# Patient Record
Sex: MALE | Race: WHITE | NOT HISPANIC OR LATINO | Employment: OTHER | URBAN - METROPOLITAN AREA
[De-identification: names, ages, dates, MRNs, and addresses within clinical notes are randomized per-mention and may not be internally consistent; named-entity substitution may affect disease eponyms.]

---

## 2023-03-13 LAB
ALANINE AMINOTRANSFERASE (SGPT) (U/L) IN SER/PLAS: 7 U/L (ref 10–52)
ALBUMIN (G/DL) IN SER/PLAS: 3.6 G/DL (ref 3.4–5)
ALKALINE PHOSPHATASE (U/L) IN SER/PLAS: 66 U/L (ref 33–136)
ANION GAP IN SER/PLAS: 10 MMOL/L (ref 10–20)
ASPARTATE AMINOTRANSFERASE (SGOT) (U/L) IN SER/PLAS: 12 U/L (ref 9–39)
BILIRUBIN TOTAL (MG/DL) IN SER/PLAS: 0.6 MG/DL (ref 0–1.2)
CALCIUM (MG/DL) IN SER/PLAS: 8.9 MG/DL (ref 8.6–10.6)
CARBON DIOXIDE, TOTAL (MMOL/L) IN SER/PLAS: 30 MMOL/L (ref 21–32)
CHLORIDE (MMOL/L) IN SER/PLAS: 100 MMOL/L (ref 98–107)
CREATININE (MG/DL) IN SER/PLAS: 1.06 MG/DL (ref 0.5–1.3)
ERYTHROCYTE DISTRIBUTION WIDTH (RATIO) BY AUTOMATED COUNT: 13 % (ref 11.5–14.5)
ERYTHROCYTE MEAN CORPUSCULAR HEMOGLOBIN CONCENTRATION (G/DL) BY AUTOMATED: 32.2 G/DL (ref 32–36)
ERYTHROCYTE MEAN CORPUSCULAR VOLUME (FL) BY AUTOMATED COUNT: 100 FL (ref 80–100)
ERYTHROCYTES (10*6/UL) IN BLOOD BY AUTOMATED COUNT: 4 X10E12/L (ref 4.5–5.9)
ESTIMATED AVERAGE GLUCOSE FOR HBA1C: 123 MG/DL
GFR MALE: 71 ML/MIN/1.73M2
GLUCOSE (MG/DL) IN SER/PLAS: 78 MG/DL (ref 74–99)
HEMATOCRIT (%) IN BLOOD BY AUTOMATED COUNT: 39.8 % (ref 41–52)
HEMOGLOBIN (G/DL) IN BLOOD: 12.8 G/DL (ref 13.5–17.5)
HEMOGLOBIN A1C/HEMOGLOBIN TOTAL IN BLOOD: 5.9 %
LEUKOCYTES (10*3/UL) IN BLOOD BY AUTOMATED COUNT: 6.8 X10E9/L (ref 4.4–11.3)
NRBC (PER 100 WBCS) BY AUTOMATED COUNT: 0 /100 WBC (ref 0–0)
PLATELETS (10*3/UL) IN BLOOD AUTOMATED COUNT: 293 X10E9/L (ref 150–450)
POTASSIUM (MMOL/L) IN SER/PLAS: 4.8 MMOL/L (ref 3.5–5.3)
PROTEIN TOTAL: 6 G/DL (ref 6.4–8.2)
SODIUM (MMOL/L) IN SER/PLAS: 135 MMOL/L (ref 136–145)
UREA NITROGEN (MG/DL) IN SER/PLAS: 12 MG/DL (ref 6–23)

## 2023-05-16 LAB
ALANINE AMINOTRANSFERASE (SGPT) (U/L) IN SER/PLAS: 6 U/L (ref 10–52)
ALBUMIN (G/DL) IN SER/PLAS: 3.8 G/DL (ref 3.4–5)
ALKALINE PHOSPHATASE (U/L) IN SER/PLAS: 73 U/L (ref 33–136)
ANION GAP IN SER/PLAS: 12 MMOL/L (ref 10–20)
ASPARTATE AMINOTRANSFERASE (SGOT) (U/L) IN SER/PLAS: 12 U/L (ref 9–39)
BILIRUBIN TOTAL (MG/DL) IN SER/PLAS: 0.7 MG/DL (ref 0–1.2)
CALCIUM (MG/DL) IN SER/PLAS: 8.7 MG/DL (ref 8.6–10.6)
CARBON DIOXIDE, TOTAL (MMOL/L) IN SER/PLAS: 31 MMOL/L (ref 21–32)
CHLORIDE (MMOL/L) IN SER/PLAS: 99 MMOL/L (ref 98–107)
CREATININE (MG/DL) IN SER/PLAS: 1.44 MG/DL (ref 0.5–1.3)
ERYTHROCYTE DISTRIBUTION WIDTH (RATIO) BY AUTOMATED COUNT: 13.2 % (ref 11.5–14.5)
ERYTHROCYTE MEAN CORPUSCULAR HEMOGLOBIN CONCENTRATION (G/DL) BY AUTOMATED: 32.6 G/DL (ref 32–36)
ERYTHROCYTE MEAN CORPUSCULAR VOLUME (FL) BY AUTOMATED COUNT: 95 FL (ref 80–100)
ERYTHROCYTES (10*6/UL) IN BLOOD BY AUTOMATED COUNT: 4.06 X10E12/L (ref 4.5–5.9)
GFR MALE: 49 ML/MIN/1.73M2
GLUCOSE (MG/DL) IN SER/PLAS: 75 MG/DL (ref 74–99)
HEMATOCRIT (%) IN BLOOD BY AUTOMATED COUNT: 38.4 % (ref 41–52)
HEMOGLOBIN (G/DL) IN BLOOD: 12.5 G/DL (ref 13.5–17.5)
LEUKOCYTES (10*3/UL) IN BLOOD BY AUTOMATED COUNT: 6.9 X10E9/L (ref 4.4–11.3)
NRBC (PER 100 WBCS) BY AUTOMATED COUNT: 0 /100 WBC (ref 0–0)
PLATELETS (10*3/UL) IN BLOOD AUTOMATED COUNT: 267 X10E9/L (ref 150–450)
POTASSIUM (MMOL/L) IN SER/PLAS: 4.1 MMOL/L (ref 3.5–5.3)
PROTEIN TOTAL: 6.4 G/DL (ref 6.4–8.2)
SODIUM (MMOL/L) IN SER/PLAS: 138 MMOL/L (ref 136–145)
UREA NITROGEN (MG/DL) IN SER/PLAS: 22 MG/DL (ref 6–23)

## 2023-09-12 ENCOUNTER — APPOINTMENT (OUTPATIENT)
Dept: PRIMARY CARE | Facility: CLINIC | Age: 83
End: 2023-09-12
Payer: MEDICARE

## 2023-10-08 PROBLEM — N18.30 CKD (CHRONIC KIDNEY DISEASE), STAGE III (MULTI): Status: ACTIVE | Noted: 2023-10-08

## 2023-10-08 PROBLEM — N40.0 BPH (BENIGN PROSTATIC HYPERPLASIA): Status: ACTIVE | Noted: 2023-10-08

## 2023-10-08 PROBLEM — N39.490 OVERFLOW INCONTINENCE OF URINE: Status: ACTIVE | Noted: 2023-10-08

## 2023-10-08 PROBLEM — M16.11 PRIMARY OSTEOARTHRITIS OF RIGHT HIP: Status: ACTIVE | Noted: 2023-10-08

## 2023-10-08 PROBLEM — I50.20 SYSTOLIC HEART FAILURE (MULTI): Status: ACTIVE | Noted: 2023-10-08

## 2023-10-08 PROBLEM — M79.2 NEUROPATHIC PAIN: Status: ACTIVE | Noted: 2023-10-08

## 2023-10-08 PROBLEM — M10.9 GOUT: Status: ACTIVE | Noted: 2023-10-08

## 2023-10-08 PROBLEM — N31.2 ATONIC BLADDER: Status: ACTIVE | Noted: 2023-10-08

## 2023-10-08 PROBLEM — I25.10 CAD (CORONARY ARTERY DISEASE): Status: ACTIVE | Noted: 2023-10-08

## 2023-10-08 PROBLEM — R60.0 BILATERAL EDEMA OF LOWER EXTREMITY: Status: ACTIVE | Noted: 2023-10-08

## 2023-10-08 PROBLEM — I15.2 HYPERTENSION ASSOCIATED WITH DIABETES (MULTI): Status: ACTIVE | Noted: 2023-10-08

## 2023-10-08 PROBLEM — T14.8XXA WOUND OF SKIN: Status: ACTIVE | Noted: 2023-10-08

## 2023-10-08 PROBLEM — R26.81 GAIT INSTABILITY: Status: ACTIVE | Noted: 2023-10-08

## 2023-10-08 PROBLEM — I87.2 VENOUS STASIS DERMATITIS OF BOTH LOWER EXTREMITIES: Status: ACTIVE | Noted: 2023-10-08

## 2023-10-08 PROBLEM — R33.9 URINARY RETENTION: Status: ACTIVE | Noted: 2023-10-08

## 2023-10-08 PROBLEM — N48.1 BALANITIS: Status: ACTIVE | Noted: 2023-10-08

## 2023-10-08 PROBLEM — C61 PROSTATE CANCER (MULTI): Status: ACTIVE | Noted: 2023-10-08

## 2023-10-08 PROBLEM — E66.01 SEVERE OBESITY (BMI 35.0-39.9) WITH COMORBIDITY (MULTI): Status: ACTIVE | Noted: 2023-10-08

## 2023-10-08 PROBLEM — N39.41 URGE INCONTINENCE OF URINE: Status: ACTIVE | Noted: 2023-10-08

## 2023-10-08 PROBLEM — E11.21 DIABETIC NEPHROPATHY (MULTI): Status: ACTIVE | Noted: 2023-10-08

## 2023-10-08 PROBLEM — N39.43 DRIBBLING OF URINE: Status: ACTIVE | Noted: 2023-10-08

## 2023-10-08 PROBLEM — Z95.1 S/P CABG (CORONARY ARTERY BYPASS GRAFT): Status: ACTIVE | Noted: 2023-10-08

## 2023-10-08 PROBLEM — N31.9 NEUROGENIC BLADDER: Status: ACTIVE | Noted: 2023-10-08

## 2023-10-08 PROBLEM — E11.59 HYPERTENSION ASSOCIATED WITH DIABETES (MULTI): Status: ACTIVE | Noted: 2023-10-08

## 2023-10-08 PROBLEM — M54.50 CHRONIC LOW BACK PAIN: Status: ACTIVE | Noted: 2023-10-08

## 2023-10-08 PROBLEM — G89.29 CHRONIC LOW BACK PAIN: Status: ACTIVE | Noted: 2023-10-08

## 2023-10-08 RX ORDER — ATORVASTATIN CALCIUM 80 MG/1
1 TABLET, FILM COATED ORAL DAILY
COMMUNITY
Start: 2018-03-03

## 2023-10-08 RX ORDER — GLIPIZIDE 10 MG/1
20 TABLET ORAL DAILY
COMMUNITY
Start: 2018-03-03 | End: 2023-12-12 | Stop reason: HOSPADM

## 2023-10-08 RX ORDER — DULAGLUTIDE 3 MG/.5ML
INJECTION, SOLUTION SUBCUTANEOUS
COMMUNITY
Start: 2018-12-13 | End: 2023-10-10 | Stop reason: ALTCHOICE

## 2023-10-08 RX ORDER — METOPROLOL TARTRATE 100 MG/1
0.5 TABLET ORAL 2 TIMES DAILY
COMMUNITY
Start: 2018-03-14 | End: 2023-12-16 | Stop reason: HOSPADM

## 2023-10-08 RX ORDER — NYSTATIN AND TRIAMCINOLONE ACETONIDE 100000; 1 [USP'U]/G; MG/G
OINTMENT TOPICAL 2 TIMES DAILY
COMMUNITY
Start: 2022-05-16 | End: 2023-12-14 | Stop reason: ENTERED-IN-ERROR

## 2023-10-08 RX ORDER — DULAGLUTIDE 1.5 MG/.5ML
INJECTION, SOLUTION SUBCUTANEOUS
COMMUNITY
End: 2023-10-10 | Stop reason: ALTCHOICE

## 2023-10-08 RX ORDER — AMMONIUM LACTATE 12 G/100G
1 CREAM TOPICAL AS NEEDED
COMMUNITY

## 2023-10-08 RX ORDER — MIRABEGRON 50 MG/1
1 TABLET, EXTENDED RELEASE ORAL DAILY
COMMUNITY
Start: 2022-07-05 | End: 2023-10-10 | Stop reason: ALTCHOICE

## 2023-10-08 RX ORDER — TORSEMIDE 100 MG/1
100 TABLET ORAL DAILY
COMMUNITY
Start: 2018-11-30 | End: 2023-12-12 | Stop reason: HOSPADM

## 2023-10-08 RX ORDER — ISOSORBIDE DINITRATE 30 MG/1
1 TABLET ORAL DAILY
COMMUNITY
End: 2023-10-10

## 2023-10-08 RX ORDER — SOLIFENACIN SUCCINATE 10 MG/1
1 TABLET, FILM COATED ORAL DAILY
COMMUNITY
Start: 2022-08-08 | End: 2023-10-10 | Stop reason: ALTCHOICE

## 2023-10-08 RX ORDER — INSULIN GLARGINE 100 [IU]/ML
36 INJECTION, SOLUTION SUBCUTANEOUS EVERY MORNING
COMMUNITY
Start: 2018-08-06 | End: 2023-12-12 | Stop reason: HOSPADM

## 2023-10-08 RX ORDER — TORSEMIDE 20 MG/1
2 TABLET ORAL EVERY MORNING
COMMUNITY
Start: 2020-09-17 | End: 2023-12-12 | Stop reason: HOSPADM

## 2023-10-08 RX ORDER — ISOSORBIDE MONONITRATE 30 MG/1
1 TABLET, EXTENDED RELEASE ORAL DAILY
COMMUNITY
Start: 2018-08-09

## 2023-10-08 RX ORDER — BLOOD SUGAR DIAGNOSTIC
STRIP MISCELLANEOUS
COMMUNITY
Start: 2016-12-13 | End: 2023-12-12 | Stop reason: HOSPADM

## 2023-10-08 RX ORDER — LISINOPRIL 20 MG/1
1 TABLET ORAL DAILY
COMMUNITY
Start: 2017-07-18 | End: 2023-11-20 | Stop reason: DRUGHIGH

## 2023-10-08 RX ORDER — NAPROXEN SODIUM 220 MG/1
81 TABLET, FILM COATED ORAL DAILY
COMMUNITY
Start: 2018-07-26

## 2023-10-08 RX ORDER — TAMSULOSIN HYDROCHLORIDE 0.4 MG/1
0.4 CAPSULE ORAL
COMMUNITY
End: 2023-10-10 | Stop reason: ALTCHOICE

## 2023-10-08 RX ORDER — PHENAZOPYRIDINE HYDROCHLORIDE 200 MG/1
200 TABLET, FILM COATED ORAL 3 TIMES DAILY
COMMUNITY
Start: 2022-06-17 | End: 2023-10-10 | Stop reason: ALTCHOICE

## 2023-10-08 RX ORDER — GABAPENTIN 100 MG/1
100 CAPSULE ORAL 3 TIMES DAILY
COMMUNITY
End: 2023-10-10 | Stop reason: ALTCHOICE

## 2023-10-08 RX ORDER — PEN NEEDLE, DIABETIC 30 GX3/16"
NEEDLE, DISPOSABLE MISCELLANEOUS
COMMUNITY
End: 2023-10-10 | Stop reason: SDUPTHER

## 2023-10-08 RX ORDER — LANCETS
EACH MISCELLANEOUS
COMMUNITY
End: 2023-12-14 | Stop reason: ENTERED-IN-ERROR

## 2023-10-08 RX ORDER — SENNOSIDES 8.6 MG/1
2 TABLET ORAL 2 TIMES DAILY
COMMUNITY
Start: 2022-06-17 | End: 2023-10-10 | Stop reason: ALTCHOICE

## 2023-10-10 ENCOUNTER — LAB (OUTPATIENT)
Dept: LAB | Facility: LAB | Age: 83
End: 2023-10-10
Payer: MEDICARE

## 2023-10-10 ENCOUNTER — OFFICE VISIT (OUTPATIENT)
Dept: PRIMARY CARE | Facility: CLINIC | Age: 83
End: 2023-10-10
Payer: MEDICARE

## 2023-10-10 ENCOUNTER — HOME HEALTH ADMISSION (OUTPATIENT)
Dept: HOME HEALTH SERVICES | Facility: HOME HEALTH | Age: 83
End: 2023-10-10
Payer: MEDICARE

## 2023-10-10 VITALS
SYSTOLIC BLOOD PRESSURE: 123 MMHG | HEART RATE: 48 BPM | DIASTOLIC BLOOD PRESSURE: 63 MMHG | TEMPERATURE: 97.5 F | OXYGEN SATURATION: 96 %

## 2023-10-10 DIAGNOSIS — J47.9 BRONCHIECTASIS, UNCOMPLICATED (MULTI): ICD-10-CM

## 2023-10-10 DIAGNOSIS — E11.22 TYPE 2 DIABETES MELLITUS WITH STAGE 3B CHRONIC KIDNEY DISEASE, WITH LONG-TERM CURRENT USE OF INSULIN (MULTI): ICD-10-CM

## 2023-10-10 DIAGNOSIS — Z79.4 TYPE 2 DIABETES MELLITUS WITH STAGE 3B CHRONIC KIDNEY DISEASE, WITH LONG-TERM CURRENT USE OF INSULIN (MULTI): ICD-10-CM

## 2023-10-10 DIAGNOSIS — E11.51 TYPE 2 DIABETES MELLITUS WITH DIABETIC PERIPHERAL ANGIOPATHY WITHOUT GANGRENE, WITH LONG-TERM CURRENT USE OF INSULIN (MULTI): ICD-10-CM

## 2023-10-10 DIAGNOSIS — R33.9 URINARY RETENTION: ICD-10-CM

## 2023-10-10 DIAGNOSIS — N18.32 TYPE 2 DIABETES MELLITUS WITH STAGE 3B CHRONIC KIDNEY DISEASE, WITH LONG-TERM CURRENT USE OF INSULIN (MULTI): Primary | ICD-10-CM

## 2023-10-10 DIAGNOSIS — C61 PROSTATE CANCER (MULTI): ICD-10-CM

## 2023-10-10 DIAGNOSIS — Z79.4 TYPE 2 DIABETES MELLITUS WITH DIABETIC PERIPHERAL ANGIOPATHY WITHOUT GANGRENE, WITH LONG-TERM CURRENT USE OF INSULIN (MULTI): ICD-10-CM

## 2023-10-10 DIAGNOSIS — N18.32 TYPE 2 DIABETES MELLITUS WITH STAGE 3B CHRONIC KIDNEY DISEASE, WITH LONG-TERM CURRENT USE OF INSULIN (MULTI): ICD-10-CM

## 2023-10-10 DIAGNOSIS — E66.01 SEVERE OBESITY (BMI 35.0-39.9) WITH COMORBIDITY (MULTI): ICD-10-CM

## 2023-10-10 DIAGNOSIS — I50.22 CHRONIC SYSTOLIC HEART FAILURE (MULTI): ICD-10-CM

## 2023-10-10 DIAGNOSIS — E11.22 TYPE 2 DIABETES MELLITUS WITH STAGE 3B CHRONIC KIDNEY DISEASE, WITH LONG-TERM CURRENT USE OF INSULIN (MULTI): Primary | ICD-10-CM

## 2023-10-10 DIAGNOSIS — Z79.4 TYPE 2 DIABETES MELLITUS WITH STAGE 3B CHRONIC KIDNEY DISEASE, WITH LONG-TERM CURRENT USE OF INSULIN (MULTI): Primary | ICD-10-CM

## 2023-10-10 PROCEDURE — 1036F TOBACCO NON-USER: CPT | Performed by: INTERNAL MEDICINE

## 2023-10-10 PROCEDURE — 99214 OFFICE O/P EST MOD 30 MIN: CPT | Performed by: INTERNAL MEDICINE

## 2023-10-10 PROCEDURE — 1159F MED LIST DOCD IN RCRD: CPT | Performed by: INTERNAL MEDICINE

## 2023-10-10 PROCEDURE — 3074F SYST BP LT 130 MM HG: CPT | Performed by: INTERNAL MEDICINE

## 2023-10-10 PROCEDURE — 83036 HEMOGLOBIN GLYCOSYLATED A1C: CPT

## 2023-10-10 PROCEDURE — 3078F DIAST BP <80 MM HG: CPT | Performed by: INTERNAL MEDICINE

## 2023-10-10 PROCEDURE — 85027 COMPLETE CBC AUTOMATED: CPT

## 2023-10-10 PROCEDURE — 36415 COLL VENOUS BLD VENIPUNCTURE: CPT

## 2023-10-10 PROCEDURE — 80053 COMPREHEN METABOLIC PANEL: CPT

## 2023-10-10 RX ORDER — GLIPIZIDE 10 MG/1
TABLET ORAL
COMMUNITY
Start: 2023-01-19 | End: 2023-10-10

## 2023-10-10 RX ORDER — PEN NEEDLE, DIABETIC 30 GX3/16"
NEEDLE, DISPOSABLE MISCELLANEOUS
Qty: 100 EACH | Refills: 2 | Status: SHIPPED | OUTPATIENT
Start: 2023-10-10

## 2023-10-10 RX ORDER — TORSEMIDE 100 MG/1
TABLET ORAL
COMMUNITY
Start: 2023-01-19 | End: 2023-10-10

## 2023-10-10 RX ORDER — ATORVASTATIN CALCIUM 80 MG/1
TABLET, FILM COATED ORAL
COMMUNITY
Start: 2023-01-19 | End: 2023-10-10 | Stop reason: ALTCHOICE

## 2023-10-10 ASSESSMENT — PATIENT HEALTH QUESTIONNAIRE - PHQ9
1. LITTLE INTEREST OR PLEASURE IN DOING THINGS: NOT AT ALL
2. FEELING DOWN, DEPRESSED OR HOPELESS: NOT AT ALL
SUM OF ALL RESPONSES TO PHQ9 QUESTIONS 1 AND 2: 0

## 2023-10-10 NOTE — PROGRESS NOTES
Subjective   Patient ID: Dennis Galo is a 83 y.o. male who presents for Follow-up.    HPI     Has  been following with Urology. Has catheter.    Leg swelling is doing better. He is elevating legs. No blisters.    Not checking sugars.  He doesn't want to try the Bev.  He is not using the trulicity, has been off for some time    Using walker at home.    Breathing at baseline.      Review of Systems    Objective   /63   Pulse (!) 48   Temp 36.4 °C (97.5 °F)   SpO2 96%     Physical Exam  Constitutional:       General: He is not in acute distress.     Comments: In wheelchair   Cardiovascular:      Rate and Rhythm: Normal rate and regular rhythm.      Heart sounds: No murmur heard.  Pulmonary:      Effort: Pulmonary effort is normal.      Breath sounds: Normal breath sounds.   Musculoskeletal:      Cervical back: No tenderness.      Thoracic back: No tenderness.      Lumbar back: No tenderness.      Right lower leg: Edema (trace) present.      Left lower leg: Edema (trace) present.   Neurological:      General: No focal deficit present.      Mental Status: He is alert.      Deep Tendon Reflexes:      Reflex Scores:       Patellar reflexes are 2+ on the right side and 2+ on the left side.        Assessment/Plan   Problem List Items Addressed This Visit             ICD-10-CM    Type 2 diabetes mellitus with chronic kidney disease, with long-term current use of insulin (CMS/[facility]) - Primary E11.22, Z79.4            DMII - With neuropathy and nephropathy. Controlled  -Stressed dietary compliance  -Continue current meds  -Sees Dr Ledesma, Podiatry.  -Sees Optho    H/o CAD - H/o 2 stents  -Cont aspirin, statin, beta blocker  -Follows with deisi Bernard to see, advised to schedule    CHF  -Follows with deisi Bernard to see, advised to schedule  -Continue torsemide, continue torsemide 160mg    LE edema - multifactorial likely from CHF and CKD  -Continue torsemide as above    HTN   -Continue current  meds    Prostate cancer - Seeing Urology and Oncology    Chronic low back pain    Hip OA    Obesity - discussed diet and lifestyle changes    Unsteady gait - Using walker    Health maintenance:  -Pneumonia vaccines UTD  -Shingles - doesn’t seem that he has had it, recommended    f/u 4 months

## 2023-10-11 LAB
ALBUMIN SERPL BCP-MCNC: 3.7 G/DL (ref 3.4–5)
ALP SERPL-CCNC: 67 U/L (ref 33–136)
ALT SERPL W P-5'-P-CCNC: 12 U/L (ref 10–52)
ANION GAP SERPL CALC-SCNC: 12 MMOL/L (ref 10–20)
AST SERPL W P-5'-P-CCNC: 13 U/L (ref 9–39)
BILIRUB SERPL-MCNC: 0.6 MG/DL (ref 0–1.2)
BUN SERPL-MCNC: 26 MG/DL (ref 6–23)
CALCIUM SERPL-MCNC: 9.2 MG/DL (ref 8.6–10.6)
CHLORIDE SERPL-SCNC: 101 MMOL/L (ref 98–107)
CO2 SERPL-SCNC: 32 MMOL/L (ref 21–32)
CREAT SERPL-MCNC: 1.64 MG/DL (ref 0.5–1.3)
ERYTHROCYTE [DISTWIDTH] IN BLOOD BY AUTOMATED COUNT: 12.7 % (ref 11.5–14.5)
EST. AVERAGE GLUCOSE BLD GHB EST-MCNC: 157 MG/DL
GFR SERPL CREATININE-BSD FRML MDRD: 42 ML/MIN/1.73M*2
GLUCOSE SERPL-MCNC: 81 MG/DL (ref 74–99)
HBA1C MFR BLD: 7.1 %
HCT VFR BLD AUTO: 38.6 % (ref 41–52)
HGB BLD-MCNC: 12.4 G/DL (ref 13.5–17.5)
MCH RBC QN AUTO: 30.9 PG (ref 26–34)
MCHC RBC AUTO-ENTMCNC: 32.1 G/DL (ref 32–36)
MCV RBC AUTO: 96 FL (ref 80–100)
NRBC BLD-RTO: 0 /100 WBCS (ref 0–0)
PLATELET # BLD AUTO: 225 X10*3/UL (ref 150–450)
PMV BLD AUTO: 12.4 FL (ref 7.5–11.5)
POTASSIUM SERPL-SCNC: 4.2 MMOL/L (ref 3.5–5.3)
PROT SERPL-MCNC: 6.7 G/DL (ref 6.4–8.2)
RBC # BLD AUTO: 4.01 X10*6/UL (ref 4.5–5.9)
SODIUM SERPL-SCNC: 141 MMOL/L (ref 136–145)
WBC # BLD AUTO: 6.3 X10*3/UL (ref 4.4–11.3)

## 2023-10-13 DIAGNOSIS — R60.0 BILATERAL EDEMA OF LOWER EXTREMITY: Primary | ICD-10-CM

## 2023-10-14 DIAGNOSIS — E11.59 TYPE 2 DIABETES MELLITUS WITH OTHER CIRCULATORY COMPLICATIONS (MULTI): ICD-10-CM

## 2023-10-17 NOTE — TELEPHONE ENCOUNTER
L/M for patient to call back for results:  Some dehydration on the labs. Dont take the torsemide for 2 days then get repeat lab next week, ordered.  Restart torsemide after skipping 2 days.

## 2023-10-18 ENCOUNTER — HOME CARE VISIT (OUTPATIENT)
Dept: HOME HEALTH SERVICES | Facility: HOME HEALTH | Age: 83
End: 2023-10-18
Payer: MEDICARE

## 2023-10-18 VITALS
OXYGEN SATURATION: 97 % | TEMPERATURE: 98.5 F | HEART RATE: 62 BPM | SYSTOLIC BLOOD PRESSURE: 126 MMHG | RESPIRATION RATE: 18 BRPM | HEIGHT: 71 IN | DIASTOLIC BLOOD PRESSURE: 60 MMHG | WEIGHT: 230 LBS | BODY MASS INDEX: 32.2 KG/M2

## 2023-10-18 PROCEDURE — G0162 HHC RN E&M PLAN SVS, 15 MIN: HCPCS

## 2023-10-18 ASSESSMENT — ACTIVITIES OF DAILY LIVING (ADL)
OASIS_M1830: 03
ENTERING_EXITING_HOME: NEEDS ASSISTANCE

## 2023-10-18 ASSESSMENT — ENCOUNTER SYMPTOMS
OCCASIONAL FEELINGS OF UNSTEADINESS: 0
APPETITE LEVEL: GOOD
HIGHEST PAIN SEVERITY IN PAST 24 HOURS: 0/10
LOWEST PAIN SEVERITY IN PAST 24 HOURS: 0/10
MUSCLE WEAKNESS: 1
PAIN SEVERITY GOAL: 0/10

## 2023-10-18 ASSESSMENT — PAIN SCALES - PAIN ASSESSMENT IN ADVANCED DEMENTIA (PAINAD): BREATHING: 0

## 2023-10-19 RX ORDER — LISINOPRIL 20 MG/1
20 TABLET ORAL DAILY
Qty: 90 TABLET | Refills: 3 | Status: SHIPPED | OUTPATIENT
Start: 2023-10-19 | End: 2023-12-16 | Stop reason: HOSPADM

## 2023-10-23 PROCEDURE — 400014 HH F/U

## 2023-11-20 ENCOUNTER — HOME CARE VISIT (OUTPATIENT)
Dept: HOME HEALTH SERVICES | Facility: HOME HEALTH | Age: 83
End: 2023-11-20
Payer: MEDICARE

## 2023-11-20 VITALS
TEMPERATURE: 98 F | OXYGEN SATURATION: 96 % | SYSTOLIC BLOOD PRESSURE: 138 MMHG | DIASTOLIC BLOOD PRESSURE: 62 MMHG | HEART RATE: 61 BPM

## 2023-11-20 PROCEDURE — 400014 HH F/U

## 2023-11-20 PROCEDURE — G0299 HHS/HOSPICE OF RN EA 15 MIN: HCPCS

## 2023-11-20 ASSESSMENT — PAIN SCALES - PAIN ASSESSMENT IN ADVANCED DEMENTIA (PAINAD)
FACIALEXPRESSION: 0 - SMILING OR INEXPRESSIVE.
CONSOLABILITY: 0
NEGVOCALIZATION: 0 - NONE.
CONSOLABILITY: 0 - NO NEED TO CONSOLE.
BREATHING: 0
FACIALEXPRESSION: 0
BODYLANGUAGE: 0 - RELAXED.
BODYLANGUAGE: 0
TOTALSCORE: 0
NEGVOCALIZATION: 0

## 2023-11-20 ASSESSMENT — ENCOUNTER SYMPTOMS
LAST BOWEL MOVEMENT: 66798
DENIES PAIN: 1
APPETITE LEVEL: GOOD
OCCASIONAL FEELINGS OF UNSTEADINESS: 0

## 2023-11-28 PROCEDURE — G0179 MD RECERTIFICATION HHA PT: HCPCS | Performed by: UROLOGY

## 2023-12-09 ENCOUNTER — APPOINTMENT (OUTPATIENT)
Dept: RADIOLOGY | Facility: HOSPITAL | Age: 83
DRG: 683 | End: 2023-12-09
Payer: MEDICARE

## 2023-12-09 ENCOUNTER — HOSPITAL ENCOUNTER (OUTPATIENT)
Facility: HOSPITAL | Age: 83
Setting detail: OBSERVATION
Discharge: HOME | DRG: 683 | End: 2023-12-12
Attending: EMERGENCY MEDICINE | Admitting: STUDENT IN AN ORGANIZED HEALTH CARE EDUCATION/TRAINING PROGRAM
Payer: MEDICARE

## 2023-12-09 DIAGNOSIS — R10.9 FLANK PAIN: ICD-10-CM

## 2023-12-09 DIAGNOSIS — I50.22 CHRONIC SYSTOLIC HEART FAILURE (MULTI): ICD-10-CM

## 2023-12-09 DIAGNOSIS — G89.29 CHRONIC LOW BACK PAIN, UNSPECIFIED BACK PAIN LATERALITY, UNSPECIFIED WHETHER SCIATICA PRESENT: ICD-10-CM

## 2023-12-09 DIAGNOSIS — E11.51 TYPE 2 DIABETES MELLITUS WITH DIABETIC PERIPHERAL ANGIOPATHY WITHOUT GANGRENE, WITH LONG-TERM CURRENT USE OF INSULIN (MULTI): ICD-10-CM

## 2023-12-09 DIAGNOSIS — I50.9 CONGESTIVE HEART FAILURE, UNSPECIFIED HF CHRONICITY, UNSPECIFIED HEART FAILURE TYPE (MULTI): ICD-10-CM

## 2023-12-09 DIAGNOSIS — I50.1 CONGESTIVE HEART FAILURE, CHRONIC, LEFT-SIDED (MULTI): ICD-10-CM

## 2023-12-09 DIAGNOSIS — Z79.4 TYPE 2 DIABETES MELLITUS WITH DIABETIC PERIPHERAL ANGIOPATHY WITHOUT GANGRENE, WITH LONG-TERM CURRENT USE OF INSULIN (MULTI): ICD-10-CM

## 2023-12-09 DIAGNOSIS — R26.81 GAIT INSTABILITY: ICD-10-CM

## 2023-12-09 DIAGNOSIS — N39.0 LOWER URINARY TRACT INFECTION: Primary | ICD-10-CM

## 2023-12-09 DIAGNOSIS — M54.50 CHRONIC LOW BACK PAIN, UNSPECIFIED BACK PAIN LATERALITY, UNSPECIFIED WHETHER SCIATICA PRESENT: ICD-10-CM

## 2023-12-09 DIAGNOSIS — I50.22 CHRONIC SYSTOLIC (CONGESTIVE) HEART FAILURE (MULTI): ICD-10-CM

## 2023-12-09 LAB
ABO GROUP (TYPE) IN BLOOD: NORMAL
ALBUMIN SERPL BCP-MCNC: 3.8 G/DL (ref 3.4–5)
ALP SERPL-CCNC: 80 U/L (ref 33–136)
ALT SERPL W P-5'-P-CCNC: 10 U/L (ref 10–52)
ANION GAP SERPL CALC-SCNC: 10 MMOL/L (ref 10–20)
ANTIBODY SCREEN: NORMAL
APPEARANCE UR: CLEAR
APTT PPP: 36 SECONDS (ref 27–38)
AST SERPL W P-5'-P-CCNC: 13 U/L (ref 9–39)
BASOPHILS # BLD AUTO: 0.07 X10*3/UL (ref 0–0.1)
BASOPHILS NFR BLD AUTO: 0.9 %
BILIRUB SERPL-MCNC: 0.8 MG/DL (ref 0–1.2)
BILIRUB UR STRIP.AUTO-MCNC: NEGATIVE MG/DL
BUN SERPL-MCNC: 27 MG/DL (ref 6–23)
CALCIUM SERPL-MCNC: 8.9 MG/DL (ref 8.6–10.3)
CHLORIDE SERPL-SCNC: 99 MMOL/L (ref 98–107)
CO2 SERPL-SCNC: 31 MMOL/L (ref 21–32)
COLOR UR: YELLOW
CREAT SERPL-MCNC: 1.69 MG/DL (ref 0.5–1.3)
EOSINOPHIL # BLD AUTO: 0.25 X10*3/UL (ref 0–0.4)
EOSINOPHIL NFR BLD AUTO: 3.3 %
ERYTHROCYTE [DISTWIDTH] IN BLOOD BY AUTOMATED COUNT: 13.2 % (ref 11.5–14.5)
ERYTHROCYTE [DISTWIDTH] IN BLOOD BY AUTOMATED COUNT: 13.2 % (ref 11.5–14.5)
GFR SERPL CREATININE-BSD FRML MDRD: 40 ML/MIN/1.73M*2
GLUCOSE BLD MANUAL STRIP-MCNC: 158 MG/DL (ref 74–99)
GLUCOSE BLD MANUAL STRIP-MCNC: 65 MG/DL (ref 74–99)
GLUCOSE SERPL-MCNC: 182 MG/DL (ref 74–99)
GLUCOSE UR STRIP.AUTO-MCNC: NEGATIVE MG/DL
HCT VFR BLD AUTO: 37.5 % (ref 41–52)
HCT VFR BLD AUTO: 41.1 % (ref 41–52)
HGB BLD-MCNC: 12.5 G/DL (ref 13.5–17.5)
HGB BLD-MCNC: 13.5 G/DL (ref 13.5–17.5)
HOLD SPECIMEN: NORMAL
HYALINE CASTS #/AREA URNS AUTO: ABNORMAL /LPF
IMM GRANULOCYTES # BLD AUTO: 0.03 X10*3/UL (ref 0–0.5)
IMM GRANULOCYTES NFR BLD AUTO: 0.4 % (ref 0–0.9)
INR PPP: 1.1 (ref 0.9–1.1)
KETONES UR STRIP.AUTO-MCNC: NEGATIVE MG/DL
LEUKOCYTE ESTERASE UR QL STRIP.AUTO: ABNORMAL
LYMPHOCYTES # BLD AUTO: 0.98 X10*3/UL (ref 0.8–3)
LYMPHOCYTES NFR BLD AUTO: 13 %
MAGNESIUM SERPL-MCNC: 1.9 MG/DL (ref 1.6–2.4)
MCH RBC QN AUTO: 30.8 PG (ref 26–34)
MCH RBC QN AUTO: 30.9 PG (ref 26–34)
MCHC RBC AUTO-ENTMCNC: 32.8 G/DL (ref 32–36)
MCHC RBC AUTO-ENTMCNC: 33.3 G/DL (ref 32–36)
MCV RBC AUTO: 93 FL (ref 80–100)
MCV RBC AUTO: 94 FL (ref 80–100)
MONOCYTES # BLD AUTO: 0.57 X10*3/UL (ref 0.05–0.8)
MONOCYTES NFR BLD AUTO: 7.6 %
NEUTROPHILS # BLD AUTO: 5.61 X10*3/UL (ref 1.6–5.5)
NEUTROPHILS NFR BLD AUTO: 74.8 %
NITRITE UR QL STRIP.AUTO: NEGATIVE
NRBC BLD-RTO: 0 /100 WBCS (ref 0–0)
NRBC BLD-RTO: 0 /100 WBCS (ref 0–0)
PH UR STRIP.AUTO: 6 [PH]
PLATELET # BLD AUTO: 207 X10*3/UL (ref 150–450)
PLATELET # BLD AUTO: 232 X10*3/UL (ref 150–450)
POTASSIUM SERPL-SCNC: 3.8 MMOL/L (ref 3.5–5.3)
PROT SERPL-MCNC: 6.3 G/DL (ref 6.4–8.2)
PROT UR STRIP.AUTO-MCNC: ABNORMAL MG/DL
PROTHROMBIN TIME: 12.8 SECONDS (ref 9.8–12.8)
RBC # BLD AUTO: 4.04 X10*6/UL (ref 4.5–5.9)
RBC # BLD AUTO: 4.39 X10*6/UL (ref 4.5–5.9)
RBC # UR STRIP.AUTO: ABNORMAL /UL
RBC #/AREA URNS AUTO: ABNORMAL /HPF
RH FACTOR (ANTIGEN D): NORMAL
SODIUM SERPL-SCNC: 136 MMOL/L (ref 136–145)
SP GR UR STRIP.AUTO: 1.01
UROBILINOGEN UR STRIP.AUTO-MCNC: <2 MG/DL
WBC # BLD AUTO: 6.3 X10*3/UL (ref 4.4–11.3)
WBC # BLD AUTO: 7.5 X10*3/UL (ref 4.4–11.3)
WBC #/AREA URNS AUTO: ABNORMAL /HPF
WBC CLUMPS #/AREA URNS AUTO: ABNORMAL /HPF

## 2023-12-09 PROCEDURE — 74176 CT ABD & PELVIS W/O CONTRAST: CPT | Performed by: STUDENT IN AN ORGANIZED HEALTH CARE EDUCATION/TRAINING PROGRAM

## 2023-12-09 PROCEDURE — 82947 ASSAY GLUCOSE BLOOD QUANT: CPT

## 2023-12-09 PROCEDURE — 85027 COMPLETE CBC AUTOMATED: CPT | Performed by: STUDENT IN AN ORGANIZED HEALTH CARE EDUCATION/TRAINING PROGRAM

## 2023-12-09 PROCEDURE — 99222 1ST HOSP IP/OBS MODERATE 55: CPT | Performed by: STUDENT IN AN ORGANIZED HEALTH CARE EDUCATION/TRAINING PROGRAM

## 2023-12-09 PROCEDURE — 2500000004 HC RX 250 GENERAL PHARMACY W/ HCPCS (ALT 636 FOR OP/ED): Performed by: EMERGENCY MEDICINE

## 2023-12-09 PROCEDURE — 85730 THROMBOPLASTIN TIME PARTIAL: CPT | Performed by: EMERGENCY MEDICINE

## 2023-12-09 PROCEDURE — 96365 THER/PROPH/DIAG IV INF INIT: CPT

## 2023-12-09 PROCEDURE — 2500000002 HC RX 250 W HCPCS SELF ADMINISTERED DRUGS (ALT 637 FOR MEDICARE OP, ALT 636 FOR OP/ED): Performed by: STUDENT IN AN ORGANIZED HEALTH CARE EDUCATION/TRAINING PROGRAM

## 2023-12-09 PROCEDURE — 72131 CT LUMBAR SPINE W/O DYE: CPT | Performed by: RADIOLOGY

## 2023-12-09 PROCEDURE — 36415 COLL VENOUS BLD VENIPUNCTURE: CPT | Performed by: STUDENT IN AN ORGANIZED HEALTH CARE EDUCATION/TRAINING PROGRAM

## 2023-12-09 PROCEDURE — 86901 BLOOD TYPING SEROLOGIC RH(D): CPT | Performed by: EMERGENCY MEDICINE

## 2023-12-09 PROCEDURE — 83735 ASSAY OF MAGNESIUM: CPT | Performed by: EMERGENCY MEDICINE

## 2023-12-09 PROCEDURE — 86900 BLOOD TYPING SEROLOGIC ABO: CPT | Performed by: EMERGENCY MEDICINE

## 2023-12-09 PROCEDURE — 96361 HYDRATE IV INFUSION ADD-ON: CPT

## 2023-12-09 PROCEDURE — G0378 HOSPITAL OBSERVATION PER HR: HCPCS

## 2023-12-09 PROCEDURE — 87086 URINE CULTURE/COLONY COUNT: CPT | Mod: PARLAB | Performed by: EMERGENCY MEDICINE

## 2023-12-09 PROCEDURE — 80053 COMPREHEN METABOLIC PANEL: CPT | Performed by: EMERGENCY MEDICINE

## 2023-12-09 PROCEDURE — 99285 EMERGENCY DEPT VISIT HI MDM: CPT | Mod: 25 | Performed by: EMERGENCY MEDICINE

## 2023-12-09 PROCEDURE — 85025 COMPLETE CBC W/AUTO DIFF WBC: CPT | Performed by: EMERGENCY MEDICINE

## 2023-12-09 PROCEDURE — 94760 N-INVAS EAR/PLS OXIMETRY 1: CPT

## 2023-12-09 PROCEDURE — 71250 CT THORAX DX C-: CPT

## 2023-12-09 PROCEDURE — 81001 URINALYSIS AUTO W/SCOPE: CPT | Performed by: EMERGENCY MEDICINE

## 2023-12-09 PROCEDURE — 2500000001 HC RX 250 WO HCPCS SELF ADMINISTERED DRUGS (ALT 637 FOR MEDICARE OP): Performed by: STUDENT IN AN ORGANIZED HEALTH CARE EDUCATION/TRAINING PROGRAM

## 2023-12-09 PROCEDURE — 72131 CT LUMBAR SPINE W/O DYE: CPT

## 2023-12-09 PROCEDURE — 74176 CT ABD & PELVIS W/O CONTRAST: CPT

## 2023-12-09 PROCEDURE — 85610 PROTHROMBIN TIME: CPT | Performed by: EMERGENCY MEDICINE

## 2023-12-09 PROCEDURE — 36415 COLL VENOUS BLD VENIPUNCTURE: CPT | Performed by: EMERGENCY MEDICINE

## 2023-12-09 RX ORDER — INSULIN LISPRO 100 [IU]/ML
5 INJECTION, SOLUTION INTRAVENOUS; SUBCUTANEOUS
Status: DISCONTINUED | OUTPATIENT
Start: 2023-12-09 | End: 2023-12-10

## 2023-12-09 RX ORDER — TORSEMIDE 20 MG/1
140 TABLET ORAL DAILY
Status: DISCONTINUED | OUTPATIENT
Start: 2023-12-10 | End: 2023-12-09

## 2023-12-09 RX ORDER — INSULIN GLARGINE 100 [IU]/ML
15 INJECTION, SOLUTION SUBCUTANEOUS NIGHTLY
Status: DISCONTINUED | OUTPATIENT
Start: 2023-12-09 | End: 2023-12-10

## 2023-12-09 RX ORDER — VALACYCLOVIR HYDROCHLORIDE 500 MG/1
500 TABLET, FILM COATED ORAL EVERY 12 HOURS SCHEDULED
Status: DISCONTINUED | OUTPATIENT
Start: 2023-12-09 | End: 2023-12-09

## 2023-12-09 RX ORDER — DEXTROSE MONOHYDRATE 100 MG/ML
0.3 INJECTION, SOLUTION INTRAVENOUS ONCE AS NEEDED
Status: DISCONTINUED | OUTPATIENT
Start: 2023-12-09 | End: 2023-12-12 | Stop reason: HOSPADM

## 2023-12-09 RX ORDER — TAMSULOSIN HYDROCHLORIDE 0.4 MG/1
0.4 CAPSULE ORAL DAILY
Status: DISCONTINUED | OUTPATIENT
Start: 2023-12-09 | End: 2023-12-12 | Stop reason: HOSPADM

## 2023-12-09 RX ORDER — LISINOPRIL 20 MG/1
20 TABLET ORAL DAILY
Status: DISCONTINUED | OUTPATIENT
Start: 2023-12-09 | End: 2023-12-12

## 2023-12-09 RX ORDER — GABAPENTIN 100 MG/1
100 CAPSULE ORAL 3 TIMES DAILY
Status: DISCONTINUED | OUTPATIENT
Start: 2023-12-09 | End: 2023-12-12 | Stop reason: HOSPADM

## 2023-12-09 RX ORDER — TORSEMIDE 20 MG/1
120 TABLET ORAL DAILY
Status: DISCONTINUED | OUTPATIENT
Start: 2023-12-10 | End: 2023-12-11

## 2023-12-09 RX ORDER — SENNOSIDES 8.6 MG/1
2 TABLET ORAL 2 TIMES DAILY
Status: DISCONTINUED | OUTPATIENT
Start: 2023-12-09 | End: 2023-12-12 | Stop reason: HOSPADM

## 2023-12-09 RX ORDER — TORSEMIDE 20 MG/1
100 TABLET ORAL DAILY
Status: DISCONTINUED | OUTPATIENT
Start: 2023-12-09 | End: 2023-12-09

## 2023-12-09 RX ORDER — DEXTROSE MONOHYDRATE 100 MG/ML
0.3 INJECTION, SOLUTION INTRAVENOUS ONCE AS NEEDED
Status: DISCONTINUED | OUTPATIENT
Start: 2023-12-09 | End: 2023-12-09

## 2023-12-09 RX ORDER — ATORVASTATIN CALCIUM 80 MG/1
80 TABLET, FILM COATED ORAL DAILY
Status: DISCONTINUED | OUTPATIENT
Start: 2023-12-09 | End: 2023-12-12 | Stop reason: HOSPADM

## 2023-12-09 RX ORDER — NAPROXEN SODIUM 220 MG/1
81 TABLET, FILM COATED ORAL DAILY
Status: DISCONTINUED | OUTPATIENT
Start: 2023-12-09 | End: 2023-12-12 | Stop reason: HOSPADM

## 2023-12-09 RX ORDER — CEFTRIAXONE 1 G/50ML
1 INJECTION, SOLUTION INTRAVENOUS EVERY 24 HOURS
Status: DISCONTINUED | OUTPATIENT
Start: 2023-12-10 | End: 2023-12-11

## 2023-12-09 RX ORDER — INSULIN LISPRO 100 [IU]/ML
0-5 INJECTION, SOLUTION INTRAVENOUS; SUBCUTANEOUS
Status: DISCONTINUED | OUTPATIENT
Start: 2023-12-09 | End: 2023-12-10

## 2023-12-09 RX ORDER — DEXTROSE 50 % IN WATER (D50W) INTRAVENOUS SYRINGE
25
Status: DISCONTINUED | OUTPATIENT
Start: 2023-12-09 | End: 2023-12-12 | Stop reason: HOSPADM

## 2023-12-09 RX ORDER — CEFTRIAXONE 1 G/50ML
1 INJECTION, SOLUTION INTRAVENOUS ONCE
Status: COMPLETED | OUTPATIENT
Start: 2023-12-09 | End: 2023-12-09

## 2023-12-09 RX ORDER — TORSEMIDE 20 MG/1
40 TABLET ORAL EVERY MORNING
Status: DISCONTINUED | OUTPATIENT
Start: 2023-12-10 | End: 2023-12-09

## 2023-12-09 RX ORDER — METOPROLOL TARTRATE 50 MG/1
50 TABLET ORAL 2 TIMES DAILY
Status: DISCONTINUED | OUTPATIENT
Start: 2023-12-09 | End: 2023-12-12 | Stop reason: HOSPADM

## 2023-12-09 RX ORDER — POLYETHYLENE GLYCOL 3350 17 G/17G
17 POWDER, FOR SOLUTION ORAL DAILY
Status: DISCONTINUED | OUTPATIENT
Start: 2023-12-09 | End: 2023-12-12 | Stop reason: HOSPADM

## 2023-12-09 RX ORDER — ISOSORBIDE MONONITRATE 30 MG/1
30 TABLET, EXTENDED RELEASE ORAL DAILY
Status: DISCONTINUED | OUTPATIENT
Start: 2023-12-09 | End: 2023-12-12 | Stop reason: HOSPADM

## 2023-12-09 RX ORDER — DEXTROSE 50 % IN WATER (D50W) INTRAVENOUS SYRINGE
25
Status: DISCONTINUED | OUTPATIENT
Start: 2023-12-09 | End: 2023-12-09

## 2023-12-09 RX ADMIN — CEFTRIAXONE SODIUM 1 G: 1 INJECTION, SOLUTION INTRAVENOUS at 15:25

## 2023-12-09 RX ADMIN — STANDARDIZED SENNA CONCENTRATE 17.2 MG: 8.6 TABLET ORAL at 21:33

## 2023-12-09 RX ADMIN — METOPROLOL TARTRATE 50 MG: 50 TABLET, FILM COATED ORAL at 21:33

## 2023-12-09 RX ADMIN — SODIUM CHLORIDE, POTASSIUM CHLORIDE, SODIUM LACTATE AND CALCIUM CHLORIDE 1000 ML: 600; 310; 30; 20 INJECTION, SOLUTION INTRAVENOUS at 13:45

## 2023-12-09 RX ADMIN — GABAPENTIN 100 MG: 100 CAPSULE ORAL at 21:33

## 2023-12-09 RX ADMIN — INSULIN GLARGINE 15 UNITS: 100 INJECTION, SOLUTION SUBCUTANEOUS at 21:33

## 2023-12-09 SDOH — ECONOMIC STABILITY: TRANSPORTATION INSECURITY
IN THE PAST 12 MONTHS, HAS LACK OF TRANSPORTATION KEPT YOU FROM MEETINGS, WORK, OR FROM GETTING THINGS NEEDED FOR DAILY LIVING?: NO

## 2023-12-09 SDOH — SOCIAL STABILITY: SOCIAL INSECURITY: ARE THERE ANY APPARENT SIGNS OF INJURIES/BEHAVIORS THAT COULD BE RELATED TO ABUSE/NEGLECT?: NO

## 2023-12-09 SDOH — ECONOMIC STABILITY: INCOME INSECURITY: IN THE LAST 12 MONTHS, WAS THERE A TIME WHEN YOU WERE NOT ABLE TO PAY THE MORTGAGE OR RENT ON TIME?: NO

## 2023-12-09 SDOH — ECONOMIC STABILITY: HOUSING INSECURITY
IN THE LAST 12 MONTHS, WAS THERE A TIME WHEN YOU DID NOT HAVE A STEADY PLACE TO SLEEP OR SLEPT IN A SHELTER (INCLUDING NOW)?: NO

## 2023-12-09 SDOH — ECONOMIC STABILITY: HOUSING INSECURITY: IN THE LAST 12 MONTHS, HOW MANY PLACES HAVE YOU LIVED?: 1

## 2023-12-09 SDOH — SOCIAL STABILITY: SOCIAL INSECURITY: DOES ANYONE TRY TO KEEP YOU FROM HAVING/CONTACTING OTHER FRIENDS OR DOING THINGS OUTSIDE YOUR HOME?: NO

## 2023-12-09 SDOH — SOCIAL STABILITY: SOCIAL INSECURITY: ARE YOU OR HAVE YOU BEEN THREATENED OR ABUSED PHYSICALLY, EMOTIONALLY, OR SEXUALLY BY ANYONE?: NO

## 2023-12-09 SDOH — SOCIAL STABILITY: SOCIAL INSECURITY: DO YOU FEEL ANYONE HAS EXPLOITED OR TAKEN ADVANTAGE OF YOU FINANCIALLY OR OF YOUR PERSONAL PROPERTY?: NO

## 2023-12-09 SDOH — SOCIAL STABILITY: SOCIAL INSECURITY: WERE YOU ABLE TO COMPLETE ALL THE BEHAVIORAL HEALTH SCREENINGS?: YES

## 2023-12-09 SDOH — SOCIAL STABILITY: SOCIAL INSECURITY: ABUSE: ADULT

## 2023-12-09 SDOH — SOCIAL STABILITY: SOCIAL INSECURITY: DO YOU FEEL UNSAFE GOING BACK TO THE PLACE WHERE YOU ARE LIVING?: NO

## 2023-12-09 SDOH — ECONOMIC STABILITY: INCOME INSECURITY: HOW HARD IS IT FOR YOU TO PAY FOR THE VERY BASICS LIKE FOOD, HOUSING, MEDICAL CARE, AND HEATING?: NOT HARD AT ALL

## 2023-12-09 SDOH — ECONOMIC STABILITY: TRANSPORTATION INSECURITY
IN THE PAST 12 MONTHS, HAS THE LACK OF TRANSPORTATION KEPT YOU FROM MEDICAL APPOINTMENTS OR FROM GETTING MEDICATIONS?: NO

## 2023-12-09 SDOH — SOCIAL STABILITY: SOCIAL INSECURITY: HAS ANYONE EVER THREATENED TO HURT YOUR FAMILY OR YOUR PETS?: NO

## 2023-12-09 SDOH — SOCIAL STABILITY: SOCIAL INSECURITY: HAVE YOU HAD THOUGHTS OF HARMING ANYONE ELSE?: NO

## 2023-12-09 ASSESSMENT — LIFESTYLE VARIABLES
HOW MANY STANDARD DRINKS CONTAINING ALCOHOL DO YOU HAVE ON A TYPICAL DAY: PATIENT DOES NOT DRINK
EVER FELT BAD OR GUILTY ABOUT YOUR DRINKING: NO
HOW OFTEN DO YOU HAVE A DRINK CONTAINING ALCOHOL: NEVER
HAVE YOU EVER FELT YOU SHOULD CUT DOWN ON YOUR DRINKING: NO
HOW OFTEN DO YOU HAVE 6 OR MORE DRINKS ON ONE OCCASION: NEVER
AUDIT-C TOTAL SCORE: 0
EVER HAD A DRINK FIRST THING IN THE MORNING TO STEADY YOUR NERVES TO GET RID OF A HANGOVER: NO
HAVE PEOPLE ANNOYED YOU BY CRITICIZING YOUR DRINKING: NO
SKIP TO QUESTIONS 9-10: 1
AUDIT-C TOTAL SCORE: 0

## 2023-12-09 ASSESSMENT — PATIENT HEALTH QUESTIONNAIRE - PHQ9
2. FEELING DOWN, DEPRESSED OR HOPELESS: NOT AT ALL
SUM OF ALL RESPONSES TO PHQ9 QUESTIONS 1 & 2: 0
1. LITTLE INTEREST OR PLEASURE IN DOING THINGS: NOT AT ALL

## 2023-12-09 ASSESSMENT — PAIN - FUNCTIONAL ASSESSMENT
PAIN_FUNCTIONAL_ASSESSMENT: 0-10
PAIN_FUNCTIONAL_ASSESSMENT: 0-10

## 2023-12-09 ASSESSMENT — PAIN SCALES - GENERAL
PAINLEVEL_OUTOF10: 0 - NO PAIN
PAINLEVEL_OUTOF10: 0 - NO PAIN

## 2023-12-09 ASSESSMENT — COLUMBIA-SUICIDE SEVERITY RATING SCALE - C-SSRS
2. HAVE YOU ACTUALLY HAD ANY THOUGHTS OF KILLING YOURSELF?: NO
1. IN THE PAST MONTH, HAVE YOU WISHED YOU WERE DEAD OR WISHED YOU COULD GO TO SLEEP AND NOT WAKE UP?: NO
6. HAVE YOU EVER DONE ANYTHING, STARTED TO DO ANYTHING, OR PREPARED TO DO ANYTHING TO END YOUR LIFE?: NO

## 2023-12-09 NOTE — ED PROVIDER NOTES
"HPI   Chief Complaint   Patient presents with    Blood in Urine       Patient is an 83-year-old male, medical history significant for coronary vascular disease, prior TIA, history of splenic infarct, along with history of prostate and renal cancer that presents to the emergency department hematuria.  Patient states he has a chronic indwelling Deal.  He states last time his change was about 3 weeks ago.  He states yesterday, he noticed that the color of the urine had changed to red.  He thought that it was blood.  He states it seemed to stop but then started again today.  He is on baby aspirin but no other anticoagulants.  He denies fever.  Did have some mild right lower quadrant pain.  He denies any nausea or vomiting.  Patient states that he stopped all of his treatments about 2 months ago.  He states \"I was just sick of taking all the pills\".                          [place] Coma Scale Score: 15                  Patient History   Past Medical History:   Diagnosis Date    Encounter for preprocedural cardiovascular examination 05/09/2019    Preop cardiovascular exam    Infarction of spleen     Splenic infarct    Intracardiac thrombosis, not elsewhere classified     Apical mural thrombus    Old myocardial infarction     Old MI (myocardial infarction)    Other forms of dyspnea 07/21/2020    RICHMOND (dyspnea on exertion)    Other injury of unspecified body region, initial encounter 10/29/2019    Wound of skin    Personal history of diseases of the skin and subcutaneous tissue 08/09/2018    History of eczema    Personal history of other diseases of the circulatory system     History of coronary artery disease    Personal history of transient ischemic attack (TIA), and cerebral infarction without residual deficits 07/21/2020    History of cerebrovascular accident     Past Surgical History:   Procedure Laterality Date    PILONIDAL CYST DRAINAGE  07/26/2018    Pilonidal Cyst Resection     No family history on file.  Social " History     Tobacco Use    Smoking status: Never    Smokeless tobacco: Never   Substance Use Topics    Alcohol use: Never    Drug use: Never       Physical Exam   ED Triage Vitals [12/09/23 1302]   Temp Heart Rate Resp BP   36.6 °C (97.9 °F) 97 20 178/72      SpO2 Temp Source Heart Rate Source Patient Position   97 % Temporal -- --      BP Location FiO2 (%)     -- --       Physical Exam  Vitals and nursing note reviewed.   Constitutional:       General: He is not in acute distress.     Appearance: He is well-developed.   HENT:      Head: Normocephalic and atraumatic.      Mouth/Throat:      Mouth: Mucous membranes are moist.   Eyes:      Conjunctiva/sclera: Conjunctivae normal.   Cardiovascular:      Rate and Rhythm: Normal rate and regular rhythm.      Heart sounds: No murmur heard.  Pulmonary:      Effort: Pulmonary effort is normal. No respiratory distress.      Breath sounds: Normal breath sounds.   Abdominal:      General: Abdomen is flat.      Palpations: Abdomen is soft.      Tenderness: There is no abdominal tenderness.   Musculoskeletal:         General: No swelling.      Cervical back: Neck supple.   Skin:     General: Skin is warm and dry.      Capillary Refill: Capillary refill takes less than 2 seconds.   Neurological:      General: No focal deficit present.      Mental Status: He is alert and oriented to person, place, and time.   Psychiatric:         Mood and Affect: Mood normal.         ED Course & MDM   ED Course as of 12/10/23 0640   Sat Dec 09, 2023   1356 Coags are normal.  CBC shows no evidence of acute anemia. [MK]   1405 Chemistry demonstrates mild renal insufficiency which appears to be chronic. [MK]   1617 Urine does show evidence of infection. [MK]   1631 CT does not show evidence of any obstruction.  Patient has been treated with antibiotics. [MK]      ED Course User Index  [MK] Castro Miranda MD         Diagnoses as of 12/10/23 0640   Lower urinary tract infection   Flank pain        Medical Decision Making  Medical Decision Making: Patient presents emergency department intermittent hematuria.  He states his catheter was changed about 3 weeks ago.  He states he will have intermittent right red blood.  He does have some old blood in the back.  His abdomen was soft and nontender.  Metabolic workup was pursued.  His Deal catheter was exchanged for three-way.  There was clear urine and urinalysis was sent off of this.  There does appear to be some evidence of infection.  Patient was started on Rocephin.  He was complaining of some pain in his gluteal area.  He does have evidence of an old incision and drainage from pilonidal cyst.  I do not feel any significant fluctuance.  Patient underwent CT imaging.  There is no evidence of obstructive process.  At this point, will admit the patient to observation for IV antibiotics and monitoring of his hematuria.    Differential Diagnoses Considered: UTI, gross hematuria, malignancy, pyelonephritis, kidney stone    Chronic Medical Conditions Significantly Affecting Care: History of renal cancer, prostate cancer    External Records Reviewed: I reviewed recent and relevant outside records including: Most recent outpatient primary care visit    Independent Interpretation of Studies:  I independently interpreted: CT obtained reviewed    Escalation of Care:  Appropriate for observation    Social Determinants of Health Significantly Affecting Care:  Intermittent follow-up    Prescription Drug Consideration: IV antibiotics    Diagnostic testing considered: Noncontributory    Discussion of Management with Other Providers:   I discussed the patient/results with: Admitting provider agrees with plan of care          Procedure  Procedures     Castro Miranda MD  12/10/23 5719

## 2023-12-09 NOTE — H&P
" Internal Medicine History and Physical     PATIENT NAME: Dennis Galo  MRN: [id number]  DATE: 12/9/2023          Subjective      Dennis Galo is a 83 y.o. male  with a past medical history notable for CAD with CABG in the past, HFrEF LVEF 40 to 45% 2019, recurrent urinary retention, CKD, prostate cancer follows with urology for urinary retention requiring chronic indwelling catheter, liu last changed around 3 weeks prior to admission who presents to [facility] after noting blood in his Liu endorsing gross hematuria.  Patient states he noted clots in his Liu bag as well as gross hematuria prompting his emergency room visit.  In the ER, patient was nontoxic-appearing he was afebrile nontachycardic nontachypneic.  Laboratory work with creatinine 1.69 near his baseline, patient without any leukocytosis did have moderate leukocyte esterase and blood in urine. CT abdomen and pelvis was obtained which was without any acute findings no pyelonephritis.  Liu catheter was exchanged in the emergency room urine is now draining clear urine there were clots in Liu after discussion with ER physician.  Patient will be admitted to medicine services with consult to urology at this time he was initiated on IV Rocephin in the setting of UTI, no suspicion or concern for sepsis at this time no SIRS criteria met.    A 10 point ROS was completed and is negative expect as stated in HPI.    PMHx: As above  PSHx: CABG, prostate sx   Social Hx: No etoh, tobacco or illicit           Objective     /72   Pulse 97   Temp 36.6 °C (97.9 °F) (Temporal)   Resp 20   Ht 1.778 m (5' 10\")   Wt 104 kg (230 lb)   SpO2 97%   BMI 33.00 kg/m²     General: Pleasant and cooperative  HEENT: Normocephalic, atraumatic, mucus membranes moist.  Eyes: EOMI  Neck:  Trachea midline.    Chest: Symmetric chest expansion, CTA bilaterally ; hx of zoster ~6 months ago faint rash R chest resolving   CV:  Regular rate, regular rhythm.  Positive " "S1/S2.  Abdomen: soft, non-tender, non-distended, no guarding or peritoneal signs   : Liu in place now draining clear urine, was gross hematuria previously   Extremities:  No lower extremity edema. Venous stasis   Neurological:  no obvious focal deficits   Psych: appropriate affect and behavior   Skin:  Warm and dry          Assessment / Plan     Pt is an 83-year-old male with history of prostate cancer and chronic urinary retention who presents to [facility] with hematuria    # Gross hematuria  # History of bladder cancer, follows with urology Dr. Quintanilla  # UTI  # Chronic lower back pain    # HFrEF LVEF 40 to 45% 2019  # CKD baseline creatinine around 1.6  # DM type II  # Medication noncompliance     Continue with IV Rocephin at this time follow-up Ucx. Nontoxic appearing, afebrile nontachycardic nontachypneic no leukocytosis no concern for sepsis type picture  Consulted urology for hematuria recommendation, Liu exchanged in ER currently draining clear urine noted clots in Liu upon exchange on admission 12/9 per report. Pt follows with Dr. Quintanilla outpatient for chronic liu in the setting of hx of prostate CA   Patient is on high dose of Torsemide at home (140mg daily), he has CKD however has not seen nephrology in \"many years\" he has also lost follow Will consult nephrologist for further recs regarding diuretic dosing. Cr level appears to be similar since October, don't suspect any LAURA type picture at this time. Cont home lisinopril   Obtain repeat echocardiogram  Continue home medications as indicated, insulin therapy titrated as indicated  Pt with some lower back pain, no red flag symptoms - will order dedicated CT of L spine per pt request as well as XR hip bones   Holding pharmacologic DVT prophylaxis at this time in the setting of hematuria  Pt with Zoster R chest ~6 months ago, was Rx antivirals however never finished course, rash resolving almost completely healed will hold off on further tx given side " effect profile        Last BM date    DVT Prophylaxis: SCDs  Full code confirmed on admission     Discharge Planning:  Will need follow-up with urology on discharge  Will need to re-establish care with Nephro, ophtho, Endo, Cardio

## 2023-12-09 NOTE — ED TRIAGE NOTES
"Pt BIBA for blood in urine x2 days per patient. Pt with chronic indwelling liu, dark urine in bag. Pt states hx of kidney CA, stopped treatment 10 months ago because it \"was too many pills\". Pt denies fevers at home, pt states mild pain to back. Pt denies any abdominal pain at this time. Pt denies any blood thinner use.   "

## 2023-12-09 NOTE — PROGRESS NOTES
Pharmacy Medication History Review    Dennis Galo is a 83 y.o. male admitted for No Principal Problem: There is no principal problem currently on the Problem List. Please update the Problem List and refresh.. Pharmacy reviewed the patient's [id number] medications and allergies for accuracy.    The list below reflectives the updated PTA list. Please review each medication in order reconciliation for additional clarification and justification.  Prior to Admission Medications   Prescriptions Last Dose Informant Patient Reported? Taking?   OneTouch Ultra Test strip Unknown  Yes Yes   Sig: USE AS DIRECTED 3 TIMES A DAY   ammonium lactate (Amlactin) 12 % cream 2023  Yes Yes   Sig: Apply 1 Application topically 2 times a day.   aspirin 81 mg chewable tablet 2023  Yes Yes   Sig: Chew 1 tablet (81 mg) once daily.   atorvastatin (Lipitor) 80 mg tablet Unknown  Yes Yes   Sig: Take 1 tablet (80 mg) by mouth once daily.   enzalutamide (Xtandi) 40 mg tablet   Yes No   Sig: Take 4 tablets (160 mg) by mouth once daily.   gabapentin (Neurontin) 100 mg capsule   Yes No   Sig: Take 1 capsule (100 mg) by mouth 3 times a day.   glipiZIDE (Glucotrol) 10 mg tablet 2023  Yes Yes   Sig: Take 2 tablets (20 mg) by mouth once daily.   insulin glargine (Lantus Solostar U-100 Insulin) 100 unit/mL (3 mL) pen 2023  Yes Yes   Sig: Inject 36 Units under the skin once daily in the morning.   isosorbide mononitrate ER (Imdur) 30 mg 24 hr tablet 2023  Yes Yes   Sig: Take 1 tablet (30 mg) by mouth once daily.   lancets (OneTouch UltraSoft Lancets) misc Unknown  Yes Yes   leuprolide acetate (LEUPROLIDE, BULK, MISC)   Yes No   Si mg. Lupron 5 MG   lisinopril 20 mg tablet 2023  No Yes   Sig: TAKE 1 TABLET BY MOUTH EVERY DAY   metoprolol tartrate (Lopressor) 100 mg tablet 2023  Yes Yes   Sig: Take 0.5 mg by mouth 2 times a day.   nystatin-triamcinolone (Mycolog II) ointment   Yes No   Sig: Apply topically  "twice a day.   pen needle, diabetic 32 gauge x 5/32\" needle Unknown  No Yes   Sig: Use one daily   tamsulosin (Flomax) 0.4 mg 24 hr capsule   Yes No   Sig: Take 1 capsule (0.4 mg) by mouth once daily.   torsemide (Demadex) 100 mg tablet 12/9/2023  Yes Yes   Sig: Take 1 tablet (100 mg) by mouth once daily.   torsemide (Demadex) 20 mg tablet 12/9/2023  Yes Yes   Sig: Take 2 tablets (40 mg) by mouth once daily in the morning. With 100 mg tab for total daily dose 140 mg   valACYclovir (Valtrex) 1 gram tablet   Yes No   Sig: Take 1 tablet (1,000 mg) by mouth 3 times a day.      Facility-Administered Medications: None        The list below reflectives the updated allergy list. Please review each documented allergy for additional clarification and justification.  Allergies  Reviewed by Rosalio Johns RN on 12/9/2023        Severity Reactions Comments    Terazosin Low Palpitations, Dizziness Dizziness and heart pounding            Below are additional concerns with the patient's PTA list.      Michelle Yarbrough CPhT    "

## 2023-12-10 ENCOUNTER — HOME CARE VISIT (OUTPATIENT)
Dept: HOME HEALTH SERVICES | Facility: HOME HEALTH | Age: 83
End: 2023-12-10
Payer: MEDICARE

## 2023-12-10 LAB
25(OH)D3 SERPL-MCNC: 22 NG/ML (ref 30–100)
ALBUMIN SERPL BCP-MCNC: 3.4 G/DL (ref 3.4–5)
ANION GAP SERPL CALC-SCNC: 11 MMOL/L (ref 10–20)
BACTERIA UR CULT: NORMAL
BUN SERPL-MCNC: 21 MG/DL (ref 6–23)
CALCIUM SERPL-MCNC: 8.4 MG/DL (ref 8.6–10.3)
CHLORIDE SERPL-SCNC: 102 MMOL/L (ref 98–107)
CO2 SERPL-SCNC: 30 MMOL/L (ref 21–32)
CREAT SERPL-MCNC: 1.36 MG/DL (ref 0.5–1.3)
ERYTHROCYTE [DISTWIDTH] IN BLOOD BY AUTOMATED COUNT: 13.1 % (ref 11.5–14.5)
EST. AVERAGE GLUCOSE BLD GHB EST-MCNC: 163 MG/DL
GFR SERPL CREATININE-BSD FRML MDRD: 52 ML/MIN/1.73M*2
GLUCOSE BLD MANUAL STRIP-MCNC: 129 MG/DL (ref 74–99)
GLUCOSE BLD MANUAL STRIP-MCNC: 136 MG/DL (ref 74–99)
GLUCOSE BLD MANUAL STRIP-MCNC: 138 MG/DL (ref 74–99)
GLUCOSE BLD MANUAL STRIP-MCNC: 213 MG/DL (ref 74–99)
GLUCOSE BLD MANUAL STRIP-MCNC: 45 MG/DL (ref 74–99)
GLUCOSE BLD MANUAL STRIP-MCNC: 89 MG/DL (ref 74–99)
GLUCOSE SERPL-MCNC: 42 MG/DL (ref 74–99)
HBA1C MFR BLD: 7.3 %
HCT VFR BLD AUTO: 39.5 % (ref 41–52)
HGB BLD-MCNC: 12.8 G/DL (ref 13.5–17.5)
MCH RBC QN AUTO: 30.3 PG (ref 26–34)
MCHC RBC AUTO-ENTMCNC: 32.4 G/DL (ref 32–36)
MCV RBC AUTO: 94 FL (ref 80–100)
NRBC BLD-RTO: 0 /100 WBCS (ref 0–0)
PHOSPHATE SERPL-MCNC: 3.4 MG/DL (ref 2.5–4.9)
PLATELET # BLD AUTO: 248 X10*3/UL (ref 150–450)
POTASSIUM SERPL-SCNC: 3.5 MMOL/L (ref 3.5–5.3)
PSA SERPL-MCNC: 4.83 NG/ML
PTH-INTACT SERPL-MCNC: 87.9 PG/ML (ref 18.5–88)
RBC # BLD AUTO: 4.22 X10*6/UL (ref 4.5–5.9)
SODIUM SERPL-SCNC: 139 MMOL/L (ref 136–145)
WBC # BLD AUTO: 6.5 X10*3/UL (ref 4.4–11.3)

## 2023-12-10 PROCEDURE — 2500000001 HC RX 250 WO HCPCS SELF ADMINISTERED DRUGS (ALT 637 FOR MEDICARE OP): Performed by: INTERNAL MEDICINE

## 2023-12-10 PROCEDURE — 2500000004 HC RX 250 GENERAL PHARMACY W/ HCPCS (ALT 636 FOR OP/ED): Performed by: STUDENT IN AN ORGANIZED HEALTH CARE EDUCATION/TRAINING PROGRAM

## 2023-12-10 PROCEDURE — 71250 CT THORAX DX C-: CPT | Performed by: RADIOLOGY

## 2023-12-10 PROCEDURE — 99232 SBSQ HOSP IP/OBS MODERATE 35: CPT | Performed by: STUDENT IN AN ORGANIZED HEALTH CARE EDUCATION/TRAINING PROGRAM

## 2023-12-10 PROCEDURE — 85027 COMPLETE CBC AUTOMATED: CPT | Performed by: STUDENT IN AN ORGANIZED HEALTH CARE EDUCATION/TRAINING PROGRAM

## 2023-12-10 PROCEDURE — 2500000004 HC RX 250 GENERAL PHARMACY W/ HCPCS (ALT 636 FOR OP/ED): Performed by: INTERNAL MEDICINE

## 2023-12-10 PROCEDURE — 94760 N-INVAS EAR/PLS OXIMETRY 1: CPT

## 2023-12-10 PROCEDURE — 80069 RENAL FUNCTION PANEL: CPT | Performed by: STUDENT IN AN ORGANIZED HEALTH CARE EDUCATION/TRAINING PROGRAM

## 2023-12-10 PROCEDURE — G0378 HOSPITAL OBSERVATION PER HR: HCPCS

## 2023-12-10 PROCEDURE — 82947 ASSAY GLUCOSE BLOOD QUANT: CPT

## 2023-12-10 PROCEDURE — 83036 HEMOGLOBIN GLYCOSYLATED A1C: CPT | Performed by: STUDENT IN AN ORGANIZED HEALTH CARE EDUCATION/TRAINING PROGRAM

## 2023-12-10 PROCEDURE — 82306 VITAMIN D 25 HYDROXY: CPT | Mod: PARLAB | Performed by: INTERNAL MEDICINE

## 2023-12-10 PROCEDURE — 84153 ASSAY OF PSA TOTAL: CPT | Mod: PARLAB | Performed by: INTERNAL MEDICINE

## 2023-12-10 PROCEDURE — 36415 COLL VENOUS BLD VENIPUNCTURE: CPT | Performed by: STUDENT IN AN ORGANIZED HEALTH CARE EDUCATION/TRAINING PROGRAM

## 2023-12-10 PROCEDURE — 2500000001 HC RX 250 WO HCPCS SELF ADMINISTERED DRUGS (ALT 637 FOR MEDICARE OP): Performed by: STUDENT IN AN ORGANIZED HEALTH CARE EDUCATION/TRAINING PROGRAM

## 2023-12-10 PROCEDURE — 83970 ASSAY OF PARATHORMONE: CPT | Mod: PARLAB | Performed by: INTERNAL MEDICINE

## 2023-12-10 PROCEDURE — 36415 COLL VENOUS BLD VENIPUNCTURE: CPT | Performed by: INTERNAL MEDICINE

## 2023-12-10 RX ORDER — TRAMADOL HYDROCHLORIDE 50 MG/1
50 TABLET ORAL EVERY 8 HOURS PRN
Status: DISCONTINUED | OUTPATIENT
Start: 2023-12-10 | End: 2023-12-12 | Stop reason: HOSPADM

## 2023-12-10 RX ORDER — OXYCODONE HYDROCHLORIDE 5 MG/1
5 TABLET ORAL EVERY 6 HOURS PRN
Status: DISCONTINUED | OUTPATIENT
Start: 2023-12-10 | End: 2023-12-12 | Stop reason: HOSPADM

## 2023-12-10 RX ORDER — ACETAMINOPHEN 325 MG/1
650 TABLET ORAL EVERY 6 HOURS PRN
Status: DISCONTINUED | OUTPATIENT
Start: 2023-12-10 | End: 2023-12-12 | Stop reason: HOSPADM

## 2023-12-10 RX ORDER — INSULIN GLARGINE 100 [IU]/ML
5 INJECTION, SOLUTION SUBCUTANEOUS NIGHTLY
Status: DISCONTINUED | OUTPATIENT
Start: 2023-12-10 | End: 2023-12-10

## 2023-12-10 RX ADMIN — GABAPENTIN 100 MG: 100 CAPSULE ORAL at 08:51

## 2023-12-10 RX ADMIN — METOPROLOL TARTRATE 50 MG: 50 TABLET, FILM COATED ORAL at 20:12

## 2023-12-10 RX ADMIN — ATORVASTATIN CALCIUM 80 MG: 80 TABLET, FILM COATED ORAL at 08:51

## 2023-12-10 RX ADMIN — GABAPENTIN 100 MG: 100 CAPSULE ORAL at 20:12

## 2023-12-10 RX ADMIN — OXYCODONE HYDROCHLORIDE 5 MG: 5 TABLET ORAL at 20:56

## 2023-12-10 RX ADMIN — METOPROLOL TARTRATE 50 MG: 50 TABLET, FILM COATED ORAL at 08:51

## 2023-12-10 RX ADMIN — SITAGLIPTIN 100 MG: 50 TABLET, FILM COATED ORAL at 12:20

## 2023-12-10 RX ADMIN — TORSEMIDE 120 MG: 20 TABLET ORAL at 08:50

## 2023-12-10 RX ADMIN — GABAPENTIN 100 MG: 100 CAPSULE ORAL at 16:12

## 2023-12-10 RX ADMIN — ASPIRIN 81 MG CHEWABLE TABLET 81 MG: 81 TABLET CHEWABLE at 08:50

## 2023-12-10 RX ADMIN — CEFTRIAXONE SODIUM 1 G: 1 INJECTION, SOLUTION INTRAVENOUS at 16:12

## 2023-12-10 RX ADMIN — STANDARDIZED SENNA CONCENTRATE 17.2 MG: 8.6 TABLET ORAL at 20:12

## 2023-12-10 RX ADMIN — ISOSORBIDE MONONITRATE 30 MG: 30 TABLET, EXTENDED RELEASE ORAL at 08:51

## 2023-12-10 RX ADMIN — TAMSULOSIN HYDROCHLORIDE 0.4 MG: 0.4 CAPSULE ORAL at 08:50

## 2023-12-10 RX ADMIN — LISINOPRIL 20 MG: 20 TABLET ORAL at 08:51

## 2023-12-10 RX ADMIN — STANDARDIZED SENNA CONCENTRATE 17.2 MG: 8.6 TABLET ORAL at 08:51

## 2023-12-10 ASSESSMENT — ACTIVITIES OF DAILY LIVING (ADL)
HEARING - RIGHT EAR: FUNCTIONAL
WALKS IN HOME: NEEDS ASSISTANCE
GROOMING: NEEDS ASSISTANCE
PATIENT'S MEMORY ADEQUATE TO SAFELY COMPLETE DAILY ACTIVITIES?: YES
ADEQUATE_TO_COMPLETE_ADL: YES
FEEDING YOURSELF: NEEDS ASSISTANCE
DRESSING YOURSELF: NEEDS ASSISTANCE
JUDGMENT_ADEQUATE_SAFELY_COMPLETE_DAILY_ACTIVITIES: YES
HEARING - LEFT EAR: FUNCTIONAL
TOILETING: NEEDS ASSISTANCE
BATHING: NEEDS ASSISTANCE
ASSISTIVE_DEVICE: WALKER

## 2023-12-10 ASSESSMENT — COGNITIVE AND FUNCTIONAL STATUS - GENERAL
STANDING UP FROM CHAIR USING ARMS: A LITTLE
HELP NEEDED FOR BATHING: A LOT
PERSONAL GROOMING: A LITTLE
CLIMB 3 TO 5 STEPS WITH RAILING: A LOT
PERSONAL GROOMING: A LITTLE
TOILETING: A LITTLE
DRESSING REGULAR LOWER BODY CLOTHING: A LOT
WALKING IN HOSPITAL ROOM: A LOT
DRESSING REGULAR LOWER BODY CLOTHING: A LOT
EATING MEALS: A LITTLE
MOVING TO AND FROM BED TO CHAIR: A LITTLE
DRESSING REGULAR UPPER BODY CLOTHING: A LOT
WALKING IN HOSPITAL ROOM: A LITTLE
DRESSING REGULAR UPPER BODY CLOTHING: A LITTLE
CLIMB 3 TO 5 STEPS WITH RAILING: A LOT
TURNING FROM BACK TO SIDE WHILE IN FLAT BAD: A LITTLE
PATIENT BASELINE BEDBOUND: NO
STANDING UP FROM CHAIR USING ARMS: A LITTLE
MOVING FROM LYING ON BACK TO SITTING ON SIDE OF FLAT BED WITH BEDRAILS: A LITTLE
MOVING TO AND FROM BED TO CHAIR: A LOT
MOVING FROM LYING ON BACK TO SITTING ON SIDE OF FLAT BED WITH BEDRAILS: A LITTLE
MOBILITY SCORE: 15
TOILETING: A LITTLE
HELP NEEDED FOR BATHING: A LITTLE
DAILY ACTIVITIY SCORE: 15
DAILY ACTIVITIY SCORE: 18
MOBILITY SCORE: 18

## 2023-12-10 ASSESSMENT — PAIN SCALES - GENERAL
PAINLEVEL_OUTOF10: 10 - WORST POSSIBLE PAIN
PAINLEVEL_OUTOF10: 0 - NO PAIN

## 2023-12-10 ASSESSMENT — PAIN - FUNCTIONAL ASSESSMENT
PAIN_FUNCTIONAL_ASSESSMENT: 0-10

## 2023-12-10 NOTE — NURSING NOTE
Resident from [facility] called concerning page for urology to Dr. Mtz. Resident to let Dr. Mtz know of consult with update. Updated [facility] Resident on Deal being changed in ER and draining clear, yellow urine with no complaints of pain at this lizeth.e

## 2023-12-10 NOTE — CONSULTS
Reason For Consult  Elevated creatinine and peripheral edema in need of high-dose diuretic therapy    History Of Present Illness  Dennis Galo is a 83 y.o. male presenting with hematuria and back pain.  He has a history of coronary artery disease status post stents, congestive heart failure with an ejection fraction of 40 to 45% on chronic high-dose diuretic therapy and metastatic prostate cancer with bilateral pulmonary nodules followed by Dr. Quintanilla.  He is supposed to be on Xtandi but he stopped this by himself a few months ago.  He has a chronic Deal catheter.  He noted some worsening of his chronic back pain as well as some gross hematuria.  For this reason he presented to the hospital.  He was noted to have a creatinine of 1.69 and chronic peripheral edema.  He was admitted to the hospital for further care.  Currently the urine in his Deal is clear and he states that his back pain appears to be stable.  He denies any chest pain or shortness of breath.  Denies any nausea or vomiting.  States that his lower extremity edema appears to be better than usual.  On his home medications, it says that he takes torsemide 140 mg daily but he is actually only taking 120 mg daily.  He is on chronic lisinopril therapy.  He seems to be vaguely aware of some kidney disease but he does not follow with a nephrologist.  He has not seen his cardiologist Dr. Kaur in years.  He does follow with Dr. Vazquez of urology.  He seems to be aware of metastatic disease.  Recent renal imaging showed bilateral renal cysts.  He denies any use of any nonsteroidal anti-inflammatory drugs.  Other review of systems are as noted above and is otherwise negative.     Past Medical History  He has a past medical history of CHF (congestive heart failure) (CMS/HCC), Chronic indwelling Deal catheter, Chronic renal disease, stage III (CMS/HCC), Diabetes mellitus (CMS/HCC), Eczema, Hiatal hernia, Hypertension, Infarction of spleen, Intracardiac  thrombosis, not elsewhere classified, Low back pain, Obesity, Old myocardial infarction, Prostate cancer (CMS/HCC), Pulmonary nodules, Renal cyst, and TIA (transient ischemic attack).    Surgical History  He has a past surgical history that includes Pilonidal cyst drainage (07/26/2018); Cardiac catheterization (2013); and Prostate surgery.     Social History  He reports that he quit smoking about 15 years ago. His smoking use included cigarettes. He has never used smokeless tobacco. He reports that he does not drink alcohol and does not use drugs.    He was a .  He lives at home with his wife.  He has no children.    Family History  No family history on file.     Allergies  Terazosin    Medications  No current facility-administered medications on file prior to encounter.     Current Outpatient Medications on File Prior to Encounter   Medication Sig Dispense Refill    ammonium lactate (Amlactin) 12 % cream Apply 1 Application topically 2 times a day.      aspirin 81 mg chewable tablet Chew 1 tablet (81 mg) once daily.      atorvastatin (Lipitor) 80 mg tablet Take 1 tablet (80 mg) by mouth once daily.      enzalutamide (Xtandi) 40 mg tablet Take 4 tablets (160 mg) by mouth once daily.      gabapentin (Neurontin) 100 mg capsule Take 1 capsule (100 mg) by mouth 3 times a day.      glipiZIDE (Glucotrol) 10 mg tablet Take 2 tablets (20 mg) by mouth once daily.      insulin glargine (Lantus Solostar U-100 Insulin) 100 unit/mL (3 mL) pen Inject 36 Units under the skin once daily in the morning.      isosorbide mononitrate ER (Imdur) 30 mg 24 hr tablet Take 1 tablet (30 mg) by mouth once daily.      lancets (OneTouch UltraSoft Lancets) misc       leuprolide acetate (LEUPROLIDE, BULK, MISC) 5 mg. Lupron 5 MG      lisinopril 20 mg tablet TAKE 1 TABLET BY MOUTH EVERY DAY 90 tablet 3    metoprolol tartrate (Lopressor) 100 mg tablet Take 0.5 mg by mouth 2 times a day.      nystatin-triamcinolone (Mycolog II)  "ointment Apply topically twice a day.      OneTouch Ultra Test strip USE AS DIRECTED 3 TIMES A DAY      pen needle, diabetic 32 gauge x 5/32\" needle Use one daily 100 each 2    tamsulosin (Flomax) 0.4 mg 24 hr capsule Take 1 capsule (0.4 mg) by mouth once daily.      torsemide (Demadex) 100 mg tablet Take 1 tablet (100 mg) by mouth once daily.      torsemide (Demadex) 20 mg tablet Take 2 tablets (40 mg) by mouth once daily in the morning. With 100 mg tab for total daily dose 140 mg      valACYclovir (Valtrex) 1 gram tablet Take 1 tablet (1,000 mg) by mouth 3 times a day.           Review of Systems  Please see HPI     Physical Exam  General: No apparent distress  HEENT: No scleral icterus  Neck: No jugular venous distention or carotid bruits  Respiratory: Clear  CVS: No rub  Abdomen: Soft  Extremities: Chronic appearing peripheral edema with ruborous appearing lower extremities.  Neuro: No asterixis  : Clear yellow urine noted in Deal             I&O 24HR    Intake/Output Summary (Last 24 hours) at 12/9/2023 1922  Last data filed at 12/9/2023 1612  Gross per 24 hour   Intake 1050 ml   Output --   Net 1050 ml       Vitals 24HR  Heart Rate:  [54-97]   Temp:  [36.6 °C (97.9 °F)-36.9 °C (98.4 °F)]   Resp:  [16-23]   BP: (137-178)/(61-80)   Height:  [177.8 cm (5' 10\")]   Weight:  [104 kg (229 lb 4.5 oz)-104 kg (230 lb)]   SpO2:  [96 %-98 %]       Relevant Results  Cr 1.69, K 3.8, HCO 31  eGFR 40  HbA1c 7.1% in Oct 2023  PSA 0.3 in 2022  Hb 13.5  UCX pending         Assessment/Plan   1.  Chronic kidney disease stage III with a baseline creatinine that is quite variable between 1.0 and 1.8 over the past several years.  Currently creatinine is at 1.69 which appears to be within his usual baseline.  Risk factors for chronic kidney disease include diabetes, hypertension, vascular disease and use of high-dose diuretic therapy  2.  Bilateral renal cysts  3.  Metastatic prostate cancer on Lupron therapy and Xtandi although he " stopped the Xtandi on his own.  He is followed by urology  4.  Chronic urinary retention with a Deal catheter in place.  He has resolved hematuria  5.  Chronic appearing peripheral edema but I do not believe is just due to congestive heart failure.  Suspect he has some lymphedema or peripheral venous insufficiency    Plan:  1.  Adjust torsemide to 120 mg daily which is his usual outpatient dose  2.  Okay to continue lisinopril  3.  Check vitamin D level and PTH  4.  Consider repeat CT scan of the chest  5.  Check a PSA as this has not been done since last year and he has a history of metastatic prostate cancer on antiandrogen therapy and was previously on Xtandi.  He has known bilateral pulmonary nodules  6.  Will try to quantify urinary protein  7.  No need for renal replacement therapy  8.  Will follow with you while in house    Thanks for involving me in the care of this patient.  If any questions, please do not hesitate to call      Israel Roland MD

## 2023-12-10 NOTE — PROGRESS NOTES
"Internal Medicine Progress Note       PATIENT NAME: Dennis Galo  MRN: [id number]  DATE: 12/10/2023          Subjective      Patient hypoglycemic this morning despite decreasing his home dose of Lantus to less than half of his usual dose, he did not have any symptoms during this event which is concerning.  Note patient has had hypoglycemic events in the past.  Patient still endorsing lower back pain this morning.  Hematuria has since resolved.  No other acute, complaints. Noted medication noncompliance. Pt with metastatic prostate CA has stopped taking his antihormonal therapy on his own.           Objective      /62   Pulse 57   Temp 35.9 °C (96.6 °F)   Resp 20   Ht 1.778 m (5' 10\")   Wt 104 kg (229 lb 4.5 oz)   SpO2 93%   BMI 32.90 kg/m²     General: Pleasant and cooperative  HEENT: Normocephalic, atraumatic, mucus membranes moist.  Eyes: EOMI  Neck:  Trachea midline.    Chest: Symmetric chest expansion, CTA bilaterally , healing zoster R chest   CV:  Regular rate, regular rhythm.  Positive S1/S2.  Abdomen: soft, non-tender, non-distended, no guarding or peritoneal signs   : Deal in place, draining straw-colored urine  Extremities:  No lower extremity edema, + venous stasis   Neurological:  no obvious focal deficits   Psych: appropriate affect and behavior   Skin:  Warm and dry          Assessment / Plan     Pt is an 83-year-old male with history of prostate cancer and chronic urinary retention who presents to [facility] with hematuria     # Gross hematuria, resolved   # History of bladder cancer with mets to bone (confirmed on biopsy), pulmonary nodules, Rec radiation therapy and hormonal manipulation therapy follows with urology Dr. Quintanilla, has seen Dr. Allen (heme/onc)  # UTI  # Chronic lower back pain, moderate to severe stenosis     # HFrEF LVEF 40 to 45% 2019  # CKD baseline creatinine around 1.6  # DM type II  # Medication noncompliance      Continue with IV Rocephin at this time follow-up Ucx. " "Nontoxic appearing, afebrile nontachycardic nontachypneic no leukocytosis no concern for sepsis type picture  Patient hypoglycemic a.m. of 12/10/2023 despite being on less than half of his home dose Lantus-he had no symptoms during this event.  He is on sulfonylurea at home.  He has had hypoglycemic events in the past as well.  Suspect his sulfonylurea will need to be discontinued on eventual discharge and his insulin regimen will need to be titrated.  Endocrinology consulted for further recommendations.  Consulted urology for hematuria recommendation, Liu exchanged in ER currently draining clear urine noted clots in Liu upon exchange on admission 12/9 per report. Pt follows with Dr. Quintanilla outpatient for chronic liu in the setting of hx of prostate CA.  He was on Xtandi and Lupron, Xgeva -  however has stopped Xtandi on his own.  He did see hematology oncology in the past Dr. Allen, note patient has had poor medication compliance.  Repeat CT chest, PSA ordered. Consider heme/onc consult as pt has not followed up with physicians previously and has stopped taking his medications on his own without guidance of his physicians   Patient is on high dose of Torsemide at home he has CKD however has not seen nephrology in \"many years\" he has also lost follow Will consult nephrologist for further recs regarding diuretic dosing. Cr level appears to be similar since October, don't suspect any LAURA type picture at this time. Cont home lisinopril.  Home dose was actually 120 mg daily which was continued by nephrology appreciate recommendation  Obtain repeat echocardiogram - previously followed with cardiology has lost follow up   Continue home medications as indicated, insulin therapy titrated as indicated  Pt with lower back pain, no red flag symptoms - CT L-spine with moderate to severe stenosis, patient with prostate cancer surgery with known mets, neurosurgery consulted for any further imaging or recommendations moving " forward  Holding pharmacologic DVT prophylaxis at this time in the setting of hematuria  Pt with Zoster R chest ~6 months ago, was Rx antivirals however never finished course, rash resolving almost completely healed will hold off on further tx given side effect profile         Last BM date    DVT Prophylaxis: SCDs  Full code confirmed on admission      Discharge Planning:  Will need follow-up with urology on discharge  Will need to re-establish care with Nephro, ophtho, Endo, Cardio, heme/onc

## 2023-12-10 NOTE — PROGRESS NOTES
"Patient seen and examined.  Having some penile pain and some intermittent hematuria.  Blood in the Deal tube is quite clear but he does have some blood-tinged urine in his Deal bag.  Says that his breathing and his lower extremity edema is improved.    Scheduled medications  aspirin, 81 mg, oral, Daily  atorvastatin, 80 mg, oral, Daily  cefTRIAXone, 1 g, intravenous, q24h  gabapentin, 100 mg, oral, TID  isosorbide mononitrate ER, 30 mg, oral, Daily  lisinopril, 20 mg, oral, Daily  metoprolol tartrate, 50 mg, oral, BID  polyethylene glycol, 17 g, oral, Daily  sennosides, 2 tablet, oral, BID  sitaGLIPtin phosphate, 100 mg, oral, Daily  tamsulosin, 0.4 mg, oral, Daily  torsemide, 120 mg, oral, Daily      Continuous medications     PRN medications  PRN medications: dextrose 10 % in water (D10W), dextrose, glucagon     Blood pressure 131/65, pulse 58, temperature 36.3 °C (97.3 °F), resp. rate 20, height 1.778 m (5' 10\"), weight 104 kg (229 lb 4.5 oz), SpO2 95 %.      Intake/Output Summary (Last 24 hours) at 12/10/2023 1642  Last data filed at 12/10/2023 1115  Gross per 24 hour   Intake --   Output 2200 ml   Net -2200 ml     General: No apparent distress  Respiratory: Diminished breath sounds bilaterally  CVS: No rub  Abdomen: Soft  Lower extremities: Chronic nonpitting edema  : Clear yellow urine in the Deal tubing but does have blood-tinged urine in the bag    Hb 12.8  Cr 1.36, K 3.5, HCO 30  PTH and Vit D pending  CT chest improvement of metastatic nodules  PSA pending    Impression:  1.  Chronic kidney disease stage III with variable baseline creatinine between 1.0 and 1.8 over the past several years.  Currently creatinine in the mid 1 range.  He is on chronic high-dose diuretic therapy for peripheral edema  2.  Bilateral renal cysts  3.  Metastatic prostate cancer on Lupron therapy although he stopped the Xtandi on his own.  It appears that the metastatic burden in his lungs is improved per CT scan of the " chest  4.  Hematuria status post replacement of chronic Deal.  He has chronic urinary retention  5.  Chronic peripheral edema likely due to lymphedema or venous insufficiency    Plan:  1.  Continue decrease torsemide at 120 mg daily  2.  Continue lisinopril  3.  Follow-up vitamin D, PTH and PSA  4.  No need for renal replacement therapy  5.  Follow-up  consultation

## 2023-12-10 NOTE — CARE PLAN
The patient's goals for the shift include to have no blood with urine output through EOS    The clinical goals for the shift include to remain hemodynamically stable through EOS      Problem: Fall/Injury  Goal: Not fall by end of shift  Outcome: Progressing  Goal: Be free from injury by end of the shift  Outcome: Progressing  Goal: Verbalize understanding of personal risk factors for fall in the hospital  Outcome: Progressing  Goal: Verbalize understanding of risk factor reduction measures to prevent injury from fall in the home  Outcome: Progressing  Goal: Use assistive devices by end of the shift  Outcome: Progressing  Goal: Pace activities to prevent fatigue by end of the shift  Outcome: Progressing     Problem: Skin  Goal: Decreased wound size/increased tissue granulation at next dressing change  Outcome: Progressing  Goal: Participates in plan/prevention/treatment measures  Outcome: Progressing  Goal: Prevent/manage excess moisture  Outcome: Progressing  Goal: Prevent/minimize sheer/friction injuries  Outcome: Progressing  Goal: Promote/optimize nutrition  Outcome: Progressing  Goal: Promote skin healing  Outcome: Progressing

## 2023-12-10 NOTE — CONSULTS
Endocrinology Initial Inpatient Consult Note     PATIENT NAME: Dennis Galo  MRN: [id number]  DATE: 12/10/2023    CONSULTING PHYSICIAN: Dr. MARISOL Dowell.  REASON FOR CONSULT: Uncontrolled T2DM.      History of Presenting Illness     83y M w. PMHx of:      # Uncontrolled T2DM      # Heart Failure with Reduced LVEF      # Stage III CKD      # Hx of Prostate Cancer      # Multiple Pulmonary Nodules      # Hypertension      # Dyslipidemia      # Class III Obesity    Patient presented to [place] [facility] on 12/9 complaining of hematuria.  He reports that he has a chronic indwelling Deal.  Patient reports that he started to notice redness in his Deal bag.  He also noted clots.  Patient was worried about this so decided to come to the ER.  He reports that he follows with [facility] urology for management of his chronic urinary retention after prostate surgery.    In the ER, CBC was unremarkable.  CMP was remarkable for hyperglycemia 102 mg/dL.  The patient serum creatinine was elevated to 1.69.  INR was found to be 1.1.  Type and screen was done.  Urinalysis showed moderate leukocyte Estrace but negative nitrite and pyuria.  Urine cultures pending.  Urine albumin creatinine ratio is pending.  A1c was found be 7.3%.  Cross-sectional region of the abdomen and pelvis showed no acute process.  CT of the lumbar spine showed DJD with moderate to severe spinal canal stenosis.  Patient had a CT of the chest which is pending a read right now.    In regards to his diabetes, the patient does not know what medicines he takes.  He denies any adrenergic or neuroglycopenic symptoms.  He does not know if he goes low or not.  He states he does not check his sugars.  He states he does not know how to check his sugars.  He reports that he follows with his primary care physician for the same.      Review of Systems (Bold if Positive)     General: Fevers, Chills, Weight Loss, or Night Sweats  HEENT: Headaches or Blurry Vision  Cardiovascular: CP,  "Palpitations, Diaphoresis, or Peripheral Edema  Respiratory: Cough, Shortness of Breath, or Hemoptysis  Gastrointestinal: N/V, Abdominal Pain, Constipation, Diarrhea, Melena, Hematochezia  Genitourinary: Polyruia, Dysuria, Urgency   Endo: Polydipsia, Heat/Cold Intolerance, Hair/Skin/Nail Changes  Rheum: Joint Pain   MSK: LBP, Muscle Pain  Psych: Anxiety, Depression      Physical Examination     /62   Pulse 63   Temp 35.9 °C (96.6 °F)   Resp 20   Ht 1.778 m (5' 10\")   Wt 104 kg (229 lb 4.5 oz)   SpO2 93%   BMI 32.90 kg/m²   General: No acute distress. A&Ox3.   HEENT: PERRLA. EOMi. Ø Exophthalmos   Neck: No LAD.  Thyroid: 20 g Ø Bruit  CV: Normal rate and rhythm. No murmurs or rubs auscultated.   Resp: CTA b/l. No wheezing or crackles.   Abdomen: Soft, nontender, nondistended obese abdomen. BSx4.   Extremities: No pedal edema b/l.  Neuro: CN II-XII Grossly Intact. Ø Tremor. Brisk Reflexes.   Psych: Appropriate affect  Skin: No apparent rashes  : Effluent paresh colored urine in Deal.  With clots.      Past Medical / Surgical / Family / Social History     Past Medical History:   Diagnosis Date    CHF (congestive heart failure) (CMS/HCC)     EF 40-45%    Chronic indwelling Deal catheter     Chronic renal disease, stage III (CMS/HCC)     Diabetes mellitus (CMS/HCC)     Eczema     Hiatal hernia     Hypertension     Infarction of spleen     Splenic infarct    Intracardiac thrombosis, not elsewhere classified     Apical mural thrombus    Low back pain     Obesity     Old myocardial infarction     Old MI (myocardial infarction)    Prostate cancer (CMS/HCC)     Pulmonary nodules     Renal cyst     bilateral    TIA (transient ischemic attack)      Past Surgical History:   Procedure Laterality Date    CARDIAC CATHETERIZATION  2013    2 stents    PILONIDAL CYST DRAINAGE  07/26/2018    Pilonidal Cyst Resection    PROSTATE SURGERY       No family history on file.  Social History     Socioeconomic History    Marital " status:      Spouse name: Not on file    Number of children: Not on file    Years of education: Not on file    Highest education level: Not on file   Occupational History    Not on file   Tobacco Use    Smoking status: Former     Types: Cigarettes     Quit date: 2008     Years since quitting: 15.9    Smokeless tobacco: Never   Substance and Sexual Activity    Alcohol use: Never    Drug use: Never    Sexual activity: Not on file   Other Topics Concern    Not on file   Social History Narrative    Not on file     Social Determinants of Health     Financial Resource Strain: Low Risk  (12/9/2023)    Overall Financial Resource Strain (CARDIA)     Difficulty of Paying Living Expenses: Not hard at all   Food Insecurity: Not on file   Transportation Needs: No Transportation Needs (12/9/2023)    PRAPARE - Transportation     Lack of Transportation (Medical): No     Lack of Transportation (Non-Medical): No   Physical Activity: Not on file   Stress: Not on file   Social Connections: Not on file   Intimate Partner Violence: Not on file   Housing Stability: Low Risk  (12/9/2023)    Housing Stability Vital Sign     Unable to Pay for Housing in the Last Year: No     Number of Places Lived in the Last Year: 1     Unstable Housing in the Last Year: No       Medications & Allergies     MAR and PTA Meds Reviewed     Allergies   Allergen Reactions    Terazosin Palpitations and Dizziness     Dizziness and heart pounding       Data     Recent Labs and Imaging Reviewed    Date  PreBreakfast Pre Lunch Pre Dinner Bedtime 3AM                                        Assessment / Plan       # Uncontrolled Type 2 Diabetes Mellitus  Home Regimen: Glipizide 20 mg Daily (admittedly patient is unclear of his regimen).  Hemoglobin A1c (12/23): 7.3%  Nutrition: CHO Controlled    Started on glargine by the primary team.  I suspect we actually could be able to control his sugars with just oral therapies.  We can use secretagogues like repaglinide  or nateglinide.  Another approach would be to put him on a renally dosed DPP 4 inhibitor.  Let us try to put him on a DPP 4 inhibitor in the hospital and see how he does.  Stop insulin.  Just use sliding scale for now.  I will have my diabetes educator, Kylah, teach him how to use glucometer tomorrow.    # Stage III CKD: We will have to keep this in mind given that he is on glipizide.    # Hypertension: Patient is only on Imdur at home.  Query the need for an ACE inhibitor given his HFrEF.  Urine albumin creatinine ratio is pending.  I suspect he will have 2 indications for a ACE/ARB.  Lisinopril 20 mg has been added by the primary team.    # HFrEF: Unfortunately given his tenuous renal function, I am not too comfortable putting him on an SGLT2 inhibitor.    # Dyslipidemia: Continue atorvastatin 80 mg daily.  Obtain lipids.    # Class III Obesity: Perhaps he would benefit from a GLP-1 receptor analog which is not renally cleared.    Discussed case with hospitalist.       Dmitri Bhatt, DO  Endocrinology, Diabetes, and Metabolism    Available via [employer] Messenger    Please excuse and typographical or unwanted errors within this documentation as voice recognition software was used to dictate this note.

## 2023-12-11 ENCOUNTER — APPOINTMENT (OUTPATIENT)
Dept: CARDIOLOGY | Facility: HOSPITAL | Age: 83
DRG: 683 | End: 2023-12-11
Payer: MEDICARE

## 2023-12-11 LAB
ALBUMIN SERPL BCP-MCNC: 3.1 G/DL (ref 3.4–5)
ANION GAP SERPL CALC-SCNC: 12 MMOL/L (ref 10–20)
AORTIC VALVE MEAN GRADIENT: 2
AORTIC VALVE PEAK VELOCITY: 0.96
AV PEAK GRADIENT: 3.6
AVA (PEAK VEL): 3.72
AVA (VTI): 4.18
BUN SERPL-MCNC: 21 MG/DL (ref 6–23)
CALCIUM SERPL-MCNC: 8.4 MG/DL (ref 8.6–10.3)
CHLORIDE SERPL-SCNC: 101 MMOL/L (ref 98–107)
CO2 SERPL-SCNC: 29 MMOL/L (ref 21–32)
CREAT SERPL-MCNC: 1.48 MG/DL (ref 0.5–1.3)
EJECTION FRACTION APICAL 4 CHAMBER: 45.4
EJECTION FRACTION: 40
ERYTHROCYTE [DISTWIDTH] IN BLOOD BY AUTOMATED COUNT: 13.1 % (ref 11.5–14.5)
GFR SERPL CREATININE-BSD FRML MDRD: 47 ML/MIN/1.73M*2
GLUCOSE BLD MANUAL STRIP-MCNC: 116 MG/DL (ref 74–99)
GLUCOSE BLD MANUAL STRIP-MCNC: 190 MG/DL (ref 74–99)
GLUCOSE BLD MANUAL STRIP-MCNC: 228 MG/DL (ref 74–99)
GLUCOSE BLD MANUAL STRIP-MCNC: 81 MG/DL (ref 74–99)
GLUCOSE SERPL-MCNC: 75 MG/DL (ref 74–99)
HCT VFR BLD AUTO: 38.4 % (ref 41–52)
HGB BLD-MCNC: 12.5 G/DL (ref 13.5–17.5)
LEFT ATRIUM VOLUME AREA LENGTH INDEX BSA: 22.4
LEFT VENTRICLE INTERNAL DIMENSION DIASTOLE: 4.87 (ref 3.5–6)
LEFT VENTRICULAR OUTFLOW TRACT DIAMETER: 2.3
MCH RBC QN AUTO: 30.4 PG (ref 26–34)
MCHC RBC AUTO-ENTMCNC: 32.6 G/DL (ref 32–36)
MCV RBC AUTO: 93 FL (ref 80–100)
MITRAL VALVE E/A RATIO: 1.73
NRBC BLD-RTO: 0 /100 WBCS (ref 0–0)
PHOSPHATE SERPL-MCNC: 3.5 MG/DL (ref 2.5–4.9)
PLATELET # BLD AUTO: 226 X10*3/UL (ref 150–450)
POTASSIUM SERPL-SCNC: 3.7 MMOL/L (ref 3.5–5.3)
RBC # BLD AUTO: 4.11 X10*6/UL (ref 4.5–5.9)
RIGHT VENTRICLE FREE WALL PEAK S': 12.7
SODIUM SERPL-SCNC: 138 MMOL/L (ref 136–145)
TRICUSPID ANNULAR PLANE SYSTOLIC EXCURSION: 2.4
WBC # BLD AUTO: 8.3 X10*3/UL (ref 4.4–11.3)

## 2023-12-11 PROCEDURE — 93306 TTE W/DOPPLER COMPLETE: CPT

## 2023-12-11 PROCEDURE — G0378 HOSPITAL OBSERVATION PER HR: HCPCS

## 2023-12-11 PROCEDURE — 99222 1ST HOSP IP/OBS MODERATE 55: CPT | Performed by: NURSE PRACTITIONER

## 2023-12-11 PROCEDURE — 93306 TTE W/DOPPLER COMPLETE: CPT | Performed by: INTERNAL MEDICINE

## 2023-12-11 PROCEDURE — 97161 PT EVAL LOW COMPLEX 20 MIN: CPT | Mod: GP

## 2023-12-11 PROCEDURE — 36415 COLL VENOUS BLD VENIPUNCTURE: CPT | Performed by: STUDENT IN AN ORGANIZED HEALTH CARE EDUCATION/TRAINING PROGRAM

## 2023-12-11 PROCEDURE — 82947 ASSAY GLUCOSE BLOOD QUANT: CPT | Mod: 59

## 2023-12-11 PROCEDURE — 94760 N-INVAS EAR/PLS OXIMETRY 1: CPT

## 2023-12-11 PROCEDURE — 97165 OT EVAL LOW COMPLEX 30 MIN: CPT | Mod: GO

## 2023-12-11 PROCEDURE — 99232 SBSQ HOSP IP/OBS MODERATE 35: CPT | Performed by: STUDENT IN AN ORGANIZED HEALTH CARE EDUCATION/TRAINING PROGRAM

## 2023-12-11 PROCEDURE — 85027 COMPLETE CBC AUTOMATED: CPT | Performed by: STUDENT IN AN ORGANIZED HEALTH CARE EDUCATION/TRAINING PROGRAM

## 2023-12-11 PROCEDURE — 2500000001 HC RX 250 WO HCPCS SELF ADMINISTERED DRUGS (ALT 637 FOR MEDICARE OP): Performed by: INTERNAL MEDICINE

## 2023-12-11 PROCEDURE — 99221 1ST HOSP IP/OBS SF/LOW 40: CPT | Performed by: INTERNAL MEDICINE

## 2023-12-11 PROCEDURE — 2500000004 HC RX 250 GENERAL PHARMACY W/ HCPCS (ALT 636 FOR OP/ED): Performed by: STUDENT IN AN ORGANIZED HEALTH CARE EDUCATION/TRAINING PROGRAM

## 2023-12-11 PROCEDURE — 2500000001 HC RX 250 WO HCPCS SELF ADMINISTERED DRUGS (ALT 637 FOR MEDICARE OP): Performed by: STUDENT IN AN ORGANIZED HEALTH CARE EDUCATION/TRAINING PROGRAM

## 2023-12-11 PROCEDURE — 2500000004 HC RX 250 GENERAL PHARMACY W/ HCPCS (ALT 636 FOR OP/ED): Performed by: INTERNAL MEDICINE

## 2023-12-11 PROCEDURE — 96376 TX/PRO/DX INJ SAME DRUG ADON: CPT | Mod: 59

## 2023-12-11 PROCEDURE — 80069 RENAL FUNCTION PANEL: CPT | Performed by: STUDENT IN AN ORGANIZED HEALTH CARE EDUCATION/TRAINING PROGRAM

## 2023-12-11 RX ORDER — CEFTRIAXONE 1 G/50ML
1 INJECTION, SOLUTION INTRAVENOUS EVERY 24 HOURS
Status: COMPLETED | OUTPATIENT
Start: 2023-12-11 | End: 2023-12-11

## 2023-12-11 RX ORDER — TORSEMIDE 20 MG/1
80 TABLET ORAL DAILY
Status: DISCONTINUED | OUTPATIENT
Start: 2023-12-12 | End: 2023-12-12

## 2023-12-11 RX ADMIN — GABAPENTIN 100 MG: 100 CAPSULE ORAL at 20:17

## 2023-12-11 RX ADMIN — ISOSORBIDE MONONITRATE 30 MG: 30 TABLET, EXTENDED RELEASE ORAL at 09:21

## 2023-12-11 RX ADMIN — TAMSULOSIN HYDROCHLORIDE 0.4 MG: 0.4 CAPSULE ORAL at 09:21

## 2023-12-11 RX ADMIN — OXYCODONE HYDROCHLORIDE 5 MG: 5 TABLET ORAL at 20:20

## 2023-12-11 RX ADMIN — TORSEMIDE 120 MG: 20 TABLET ORAL at 09:30

## 2023-12-11 RX ADMIN — METOPROLOL TARTRATE 50 MG: 50 TABLET, FILM COATED ORAL at 20:17

## 2023-12-11 RX ADMIN — METOPROLOL TARTRATE 50 MG: 50 TABLET, FILM COATED ORAL at 09:21

## 2023-12-11 RX ADMIN — STANDARDIZED SENNA CONCENTRATE 17.2 MG: 8.6 TABLET ORAL at 20:17

## 2023-12-11 RX ADMIN — GABAPENTIN 100 MG: 100 CAPSULE ORAL at 15:27

## 2023-12-11 RX ADMIN — SITAGLIPTIN 100 MG: 50 TABLET, FILM COATED ORAL at 09:21

## 2023-12-11 RX ADMIN — ASPIRIN 81 MG CHEWABLE TABLET 81 MG: 81 TABLET CHEWABLE at 09:21

## 2023-12-11 RX ADMIN — POLYETHYLENE GLYCOL 3350 17 G: 17 POWDER, FOR SOLUTION ORAL at 09:20

## 2023-12-11 RX ADMIN — LISINOPRIL 20 MG: 20 TABLET ORAL at 09:21

## 2023-12-11 RX ADMIN — ATORVASTATIN CALCIUM 80 MG: 80 TABLET, FILM COATED ORAL at 09:21

## 2023-12-11 RX ADMIN — STANDARDIZED SENNA CONCENTRATE 17.2 MG: 8.6 TABLET ORAL at 09:20

## 2023-12-11 RX ADMIN — GABAPENTIN 100 MG: 100 CAPSULE ORAL at 09:21

## 2023-12-11 RX ADMIN — CEFTRIAXONE SODIUM 1 G: 1 INJECTION, SOLUTION INTRAVENOUS at 15:27

## 2023-12-11 ASSESSMENT — COGNITIVE AND FUNCTIONAL STATUS - GENERAL
MOVING FROM LYING ON BACK TO SITTING ON SIDE OF FLAT BED WITH BEDRAILS: A LITTLE
TOILETING: A LITTLE
TURNING FROM BACK TO SIDE WHILE IN FLAT BAD: A LITTLE
DRESSING REGULAR UPPER BODY CLOTHING: A LITTLE
DAILY ACTIVITIY SCORE: 18
HELP NEEDED FOR BATHING: A LOT
CLIMB 3 TO 5 STEPS WITH RAILING: A LOT
WALKING IN HOSPITAL ROOM: A LOT
MOBILITY SCORE: 18
MOBILITY SCORE: 16
DRESSING REGULAR UPPER BODY CLOTHING: A LITTLE
WALKING IN HOSPITAL ROOM: A LITTLE
PERSONAL GROOMING: A LITTLE
DRESSING REGULAR UPPER BODY CLOTHING: A LITTLE
DRESSING REGULAR LOWER BODY CLOTHING: A LITTLE
DRESSING REGULAR LOWER BODY CLOTHING: A LOT
DRESSING REGULAR LOWER BODY CLOTHING: A LOT
MOVING TO AND FROM BED TO CHAIR: A LITTLE
TURNING FROM BACK TO SIDE WHILE IN FLAT BAD: A LITTLE
MOBILITY SCORE: 24
HELP NEEDED FOR BATHING: A LOT
EATING MEALS: A LITTLE
HELP NEEDED FOR BATHING: A LITTLE
STANDING UP FROM CHAIR USING ARMS: A LITTLE
TOILETING: A LITTLE
DAILY ACTIVITIY SCORE: 18
STANDING UP FROM CHAIR USING ARMS: A LITTLE
CLIMB 3 TO 5 STEPS WITH RAILING: A LOT
TOILETING: A LITTLE
DAILY ACTIVITIY SCORE: 18
MOVING TO AND FROM BED TO CHAIR: A LITTLE

## 2023-12-11 ASSESSMENT — PAIN - FUNCTIONAL ASSESSMENT
PAIN_FUNCTIONAL_ASSESSMENT: 0-10
PAIN_FUNCTIONAL_ASSESSMENT: 0-10

## 2023-12-11 ASSESSMENT — PAIN SCALES - GENERAL
PAINLEVEL_OUTOF10: 0 - NO PAIN
PAINLEVEL_OUTOF10: 8
PAINLEVEL_OUTOF10: 0 - NO PAIN
PAINLEVEL_OUTOF10: 10 - WORST POSSIBLE PAIN

## 2023-12-11 NOTE — PROGRESS NOTES
"SW met with pt at pt bedside to discuss any community needs. Pt explained that his wife has HHC that comes to the home to assist her. SW explained that HHC can also come in and be a support for pt if this is a service he would like to have. Pt explained that he would be open to more assistance at home but feels that he needs someone to come and help clean. SW explained the role of Private Duty Aides and that they can assist with personal hygiene, transportation, light cleaning, medication reminders, and other duties. Pt interested in this service. SW provided pt with a list of agencies as well as pricing. Pt explained that he would be willing to pay out of pocket because he knows this is a service him and his wife need. Pt also said that his wife does not really like having outside people in their home so he would speak with her about it. Pt explained that his wife has issues with hearing and she needs to have her hearing aides adjusted so speaking with her on the phone is difficult at this time. SW asked if pt would like a referral for HHC with the same agency that his wife uses. Pt said he could not remember the name of the agency but may remember if he sees a list. SW provided pt with a HHC list to review. Pt admitted to issues with his memory lately, that \"I can remember thins from 50 years ago but I can't remember much after 5 minutes.\" FABI will discuss with team on next course of action for safe dc planning. Pt expressed a want to get involved with his [employer] again and have his  come and pray with him. FABI reached out to pts [employer] ([facility]) and requested for someone to come and visit and pray with pt. They accepted request and will be coming to visit and provide spiritual support. FABI will follow up with pt tomorrow to discuss HHC further.     ARTEMIO LEWIS      "

## 2023-12-11 NOTE — CONSULTS
"Reason For Consult  The patient was referred to me for further evaluation and management of metastatic adenocarcinoma prostate.    History Of Present Illness  Dennis Galo is a 83 y.o. male presenting with hematuria and back pain.  The patient was evaluated further and noted to have urinary tract infection and placed on antibiotics.  Urine seems to have cleared.  The patient has a known history of metastatic prostate cancer evaluated 1 year ago and was placed on Xtandi 160 mg p.o. daily as well as Lupron injections every 3 months.  The patient was lost to follow-up until now.  Apparently the patient discontinued medications both Xtandi as well as Lupron injections on his own without telling anyone is not sure whether he would want to start again or not..     Past Medical History  He has a past medical history of CHF (congestive heart failure) (CMS/HCC), Chronic indwelling Deal catheter, Chronic renal disease, stage III (CMS/HCC), Diabetes mellitus (CMS/HCC), Eczema, Hiatal hernia, Hypertension, Infarction of spleen, Intracardiac thrombosis, not elsewhere classified, Low back pain, Obesity, Old myocardial infarction, Prostate cancer (CMS/HCC), Pulmonary nodules, Renal cyst, and TIA (transient ischemic attack).    Surgical History  He has a past surgical history that includes Pilonidal cyst drainage (07/26/2018); Cardiac catheterization (2013); and Prostate surgery.     Social History  He reports that he quit smoking about 15 years ago. His smoking use included cigarettes. He has never used smokeless tobacco. He reports that he does not drink alcohol and does not use drugs.    Family History  No family history on file.     Allergies  Terazosin    Review of Systems  As per HPI all others negative.     Physical Exam        Last Recorded Vitals  Blood pressure 106/59, pulse 60, temperature 36.4 °C (97.5 °F), temperature source Temporal, resp. rate 18, height 1.778 m (5' 10\"), weight 104 kg (229 lb 4.5 oz), SpO2 94 " %.    Relevant Results  Conscious, alert, oriented x 3 and in no acute distress.    Vital signs noted.    HEENT normocephalic atraumatic pupils are regular equal reacting to light and accommodation nectars oral cavity is normal.    Neck is supple there is no cervical supraclavicular lymphadenopathy no carotid bruit no thyromegaly.    Chest clear to auscultation.    Both heart sounds normal without a murmur    Abdomen is soft hepatosplenomegaly bowel sounds present    Central system exam grossly normal.    Skin extremities normal all peripheral pulses well felt.         Assessment/Plan   Patient is an 83-year-old man with multiple medical problems including prostatic adenocarcinoma of prostate.  Last evaluated 1 year ago when he was placed on Xtandi 160 mg p.o. daily, Lupron injection every 3 months and Xgeva 120 mg every 4 weeks.  The patient was lost to follow-up.  Moreover, the patient discontinued everything on his own without telling anyone.  Currently the patient is not sure whether he would start it or not.  If agreeable would recommend resuming Xtandi 160 mg p.o. daily, Xgeva 120 mg subcu every 4 weeks and Lupron injections 22.5 mg subcu q. 3 months.  If agreeable follow-up with me after discharge in 3 to 4 weeks.              Yogesh Allen MD

## 2023-12-11 NOTE — CARE PLAN
The patient's goals for the shift include to have no blood with urine output through EOS    The clinical goals for the shift include to remain hemodynamically stable through EOS

## 2023-12-11 NOTE — PROGRESS NOTES
TCC Note: Messaged MD to request an order for PT/OT for this patient for most advantageous discharge plan. RN in with pt. Will meet with them after nurse is finished. Myriam Figueroa, MSN, RN, TCC.

## 2023-12-11 NOTE — NURSING NOTE
"12/11/23 1035 Patient Navigator  I introduced myself to the patient and explained my role. Pt states he was diagnosed with diabetes over 20 years ago. He states, \"I don't know what medication I am on for my diabetes. My wife give me my medications.\" He admits that he doesn't monitor his blood sugar nor does he want to learn. I stressed the importance of having his eyes examined with an Ophthalmologist. He was having a Podiatrist assess his feet in the past however he stopped over a year ago- he will attempt to make appointments with both. He states he used to ride a stationary bike however he is unable to do any more. I informed him of the benefits of exercise and the ADA recommendations of 150 min a week. He declined any handouts. I did give him my contact information to call me as needed- he verbalized understanding. He thanked me for my visit and I updated his RN, Jessica.    Kylah ELISE, RN  Diabetes Care &   Stroke Educator    "

## 2023-12-11 NOTE — CARE PLAN
The patient's goals for the shift include to have no blood with urine output through EOS    The clinical goals for the shift include monitor output during shift.    Over the shift, the patient did not make progress toward the following goals. Barriers to progression include UTI monique urine. Recommendations to address these barriers include antibiotics.

## 2023-12-11 NOTE — CONSULTS
"Inpatient consult to Urology  Consult performed by: NEREIDA Menendez-CNP  Consult ordered by: Jakub Dowell DO  Reason for consult: Hematuria, h/o prostate ca          Reason For Consult  Hematuria     History Of Present Illness  Dennis Galo is a 83 y.o. male presenting with intermittent hematuria since Tuesday 12/5. Urine was a dark red color with clots which prompted ED visit. He denies any other associated symptoms including suprapubic or flank pain, fever/chills, nausea/vomiting. He has been getting his liu catheter exchanged by [facility] every 4 weeks as recommended. He has chronic lower back pain. He takes baby ASA but denies other anticoagulant use. ER placed a 20 fr 3-way catheter on 12/9. Urine is yellow with sediment and no hematuria since Saturday. Irrigation has not been necessary.     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 He did not wish to self-cath. He has been following with Dr. Quintanilla and oncology for metastatic prostate ca. He stopped follow-up with oncology and stopped taking antihormonal therapy about 2 1/2 months ago. He was \"sick of taking all these pills.\" He has not followed up with Dr. Quintanilla since April 2023.      Past Medical History  He has a past medical history of CHF (congestive heart failure) (CMS/HCC), Chronic indwelling Liu catheter, Chronic renal disease, stage III (CMS/HCC), Diabetes mellitus (CMS/HCC), Eczema, Hiatal hernia, Hypertension, Infarction of spleen, Intracardiac thrombosis, not elsewhere classified, Low back pain, Obesity, Old myocardial infarction, Prostate cancer (CMS/HCC), Pulmonary nodules, Renal cyst, and TIA (transient ischemic attack).    Surgical History  He has a past surgical history that includes Pilonidal cyst drainage (07/26/2018); Cardiac catheterization (2013); and Prostate surgery.     Social History  He reports that he quit smoking about 15 years ago. His smoking use included cigarettes. He has never used " "smokeless tobacco. He reports that he does not drink alcohol and does not use drugs.    Family History  No family history on file.     Allergies  Terazosin    Review of Systems   All other systems reviewed and are negative.       Physical Exam  Constitutional:       Appearance: Normal appearance.   HENT:      Mouth/Throat:      Mouth: Mucous membranes are moist.   Cardiovascular:      Rate and Rhythm: Normal rate and regular rhythm.   Pulmonary:      Effort: Pulmonary effort is normal.      Breath sounds: Normal breath sounds.   Abdominal:      Palpations: Abdomen is soft.      Tenderness: There is no abdominal tenderness. There is no right CVA tenderness or left CVA tenderness.   Genitourinary:     Penis: Normal and uncircumcised.       Testes: Normal.      Comments: 20 fr 3-way liu catheter intact. Yellow urine with sediment.   Skin:     General: Skin is warm and dry.   Neurological:      Mental Status: He is alert and oriented to person, place, and time. Mental status is at baseline.   Psychiatric:         Mood and Affect: Mood normal.         Behavior: Behavior normal.          Last Recorded Vitals  Blood pressure 139/65, pulse 60, temperature 36.7 °C (98.1 °F), temperature source Temporal, resp. rate 17, height 1.778 m (5' 10\"), weight 104 kg (229 lb 4.5 oz), SpO2 96 %.    Relevant Results      Scheduled medications  aspirin, 81 mg, oral, Daily  atorvastatin, 80 mg, oral, Daily  cefTRIAXone, 1 g, intravenous, q24h  gabapentin, 100 mg, oral, TID  isosorbide mononitrate ER, 30 mg, oral, Daily  lisinopril, 20 mg, oral, Daily  metoprolol tartrate, 50 mg, oral, BID  polyethylene glycol, 17 g, oral, Daily  sennosides, 2 tablet, oral, BID  sitaGLIPtin phosphate, 100 mg, oral, Daily  tamsulosin, 0.4 mg, oral, Daily  torsemide, 120 mg, oral, Daily      Continuous medications     PRN medications  PRN medications: acetaminophen, dextrose 10 % in water (D10W), dextrose, glucagon, oxyCODONE, traMADol  Results for orders " placed or performed during the hospital encounter of 12/09/23 (from the past 24 hour(s))   POCT GLUCOSE   Result Value Ref Range    POCT Glucose 129 (H) 74 - 99 mg/dL   POCT GLUCOSE   Result Value Ref Range    POCT Glucose 138 (H) 74 - 99 mg/dL   POCT GLUCOSE   Result Value Ref Range    POCT Glucose 213 (H) 74 - 99 mg/dL   POCT GLUCOSE   Result Value Ref Range    POCT Glucose 81 74 - 99 mg/dL   CBC   Result Value Ref Range    WBC 8.3 4.4 - 11.3 x10*3/uL    nRBC 0.0 0.0 - 0.0 /100 WBCs    RBC 4.11 (L) 4.50 - 5.90 x10*6/uL    Hemoglobin 12.5 (L) 13.5 - 17.5 g/dL    Hematocrit 38.4 (L) 41.0 - 52.0 %    MCV 93 80 - 100 fL    MCH 30.4 26.0 - 34.0 pg    MCHC 32.6 32.0 - 36.0 g/dL    RDW 13.1 11.5 - 14.5 %    Platelets 226 150 - 450 x10*3/uL   Renal function panel   Result Value Ref Range    Glucose 75 74 - 99 mg/dL    Sodium 138 136 - 145 mmol/L    Potassium 3.7 3.5 - 5.3 mmol/L    Chloride 101 98 - 107 mmol/L    Bicarbonate 29 21 - 32 mmol/L    Anion Gap 12 10 - 20 mmol/L    Urea Nitrogen 21 6 - 23 mg/dL    Creatinine 1.48 (H) 0.50 - 1.30 mg/dL    eGFR 47 (L) >60 mL/min/1.73m*2    Calcium 8.4 (L) 8.6 - 10.3 mg/dL    Phosphorus 3.5 2.5 - 4.9 mg/dL    Albumin 3.1 (L) 3.4 - 5.0 g/dL    CT chest high resolution    Result Date: 12/10/2023  Interpreted By:  Romario Nguyen, STUDY: CT CHEST HIGH RESOLUTION;  12/10/2023 10:08 am   INDICATION: Signs/Symptoms:prostate CA rule out any mets.   COMPARISON: 08/26/2022   ACCESSION NUMBER(S): [id number]   ORDERING CLINICIAN: HAYLIE URIBE   TECHNIQUE: Helical data acquisition of the chest was obtained  without IV contrast material.  Images were reformatted in axial, coronal, and sagittal planes.   FINDINGS: LUNGS and AIRWAYS: Overall there has been increase in multiple pulmonary nodules. Index nodules include a 4 mm nodule, and 9 mm ground-glass nodule in the right lung on image 49 of series 605, with the ground-glass nodule previously measuring 1 cm and denser. A 5 mm nodule on image 268  previously measured approximately 7 mm. In the left lung a 6 mm ground-glass nodule on image 154 previously measured 8 mm, and a 4 mm nodule on image 140 measuring approximately 6 mm. There has also been resolution of several nodules. Opacities at the lung bases posteriorly is probably due to compressive atelectasis.   MEDIASTINUM and LU, LOWER NECK AND AXILLA: The visualized thyroid gland is within normal limits.   No evidence of thoracic lymphadenopathy by CT criteria.   HEART and VESSELS: The thoracic aorta is of normal course and caliber without significant atherosclerotic calcification .   Main pulmonary artery and its branches are normal in caliber.   There is radiodensity along the courses of the coronary arteries indicating extensive atherosclerotic calcification and/or stents.   The cardiac chambers are not enlarged.   UPPER ABDOMEN: The visualized subdiaphragmatic structures demonstrate no remarkable findings.   CHEST WALL, OSSEOUS STRUCTURES AND OTHER FINDINGS: There are no suspicious osseous lesions.       1.  Improvement of pulmonary nodules presumably from metastatic disease.   Signed by: Romario Nguyen 12/10/2023 1:24 PM Dictation workstation:   [id number]    CT lumbar spine wo IV contrast    Result Date: 12/9/2023  Interpreted By:  Nehmeias Pantoja, STUDY: CT LUMBAR SPINE WO IV CONTRAST;  12/9/2023 6:31 pm   INDICATION: Signs/Symptoms:back and buttock pain.   COMPARISON: None.   ACCESSION NUMBER(S): [id number]   ORDERING CLINICIAN: HAYLIE URIBE   TECHNIQUE: Axial noncontrast images of the lumbar spine with coronal and sagittal reconstructed images.   FINDINGS: ALIGNMENT: No traumatic spondylolisthesis or traumatic facet widening.   VERTEBRAE: No acute fracture. Vertebral body heights are maintained.   SPINAL CANAL/INTERVERTEBRAL DISCS: Multilevel disc space height loss most pronounced at L4-L5 with multilevel posterior disc bulge. Ligamentum flavum hypertrophy with variable degrees of spinal canal  stenosis noted throughout the lumbar spine moderate-to-severe at L2-L3 and L4-L5.   PARASPINAL SOFT TISSUES: No significant abnormality.   VISUALIZED ABDOMEN: The urinary bladder is decompressed with a Deal catheter. Bladder wall thickening. Correlate for evidence of cystitis.       No acute fracture or traumatic malalignment of the lumbar spine.   Multilevel degenerative changes throughout the lumbar spine with moderate-to-severe spinal canal stenosis at L2-L3 and L4-L5.   Signed by: Nehemias Pantoja 12/9/2023 6:43 PM Dictation workstation:   [id number]    CT abdomen pelvis wo IV contrast    Result Date: 12/9/2023  Interpreted By:  Minh Pedraza, STUDY: CT ABDOMEN PELVIS WO IV CONTRAST;  12/9/2023 4:07 pm   INDICATION: Signs/Symptoms:hematuria, pain, h/o cancer.   COMPARISON: CT abdomen and pelvis 07/27/2022   ACCESSION NUMBER(S): [id number]   ORDERING CLINICIAN: ARIEL RAZA   TECHNIQUE: CT of the abdomen and pelvis was performed. Contiguous axial images were obtained at 3 mm slice thickness through the abdomen and pelvis. Coronal and sagittal reconstructions at 3 mm slice thickness were performed.  No intravenous contrast was administered.   FINDINGS: Please note that the evaluation of vessels, lymph nodes and organs is limited without intravenous contrast.   LOWER CHEST: Bibasilar atelectasis.   ABDOMEN:   LIVER: The liver demonstrates homogeneous attenuation without evidence of focal liver lesions.   BILE DUCTS: The intrahepatic and extrahepatic ducts are not dilated.   GALLBLADDER: The gallbladder is nondistended and without evidence of radiopaque stones.   PANCREAS: The pancreas appears unremarkable without evidence of ductal dilatation or masses.   SPLEEN: The spleen is normal in size without focal lesions.   ADRENAL GLANDS: Bilateral adrenal glands appear normal.   KIDNEYS AND URETERS: The kidneys are normal in size and unremarkable in appearance. Multiple bilateral simple cysts, the largest in the  left interpolar region measures 5.5 cm. No hydroureteronephrosis or nephroureterolithiasis is identified.   PELVIS:   BLADDER: Decompressed urinary bladder Liu catheter in-situ.   REPRODUCTIVE ORGANS: No pelvic masses.   BOWEL: Small hiatal hernia. Stomach is otherwise unremarkable. No bowel dilation or wall thickening. Moderate colonic stool. The appendix is not definitely visualized. There is however no pericecal stranding or fluid.   VESSELS: Moderate atherosclerotic changes are noted to the aorta and branching vessels. There is no aneurysmal dilatation.   PERITONEUM/RETROPERITONEUM/LYMPH NODES: There is no free or loculated fluid collection, no free intraperitoneal air. The retroperitoneum appears normal.  No abdominopelvic lymphadenopathy is present.   ABDOMINAL WALL: The abdominal wall soft tissues appear normal.   BONES: No suspicious osseous lesions are identified. Degenerative discogenic disease is noted in the lower thoracic and lumbar spine.       No acute findings in the abdomen and pelvis. Moderate colonic stool.   Signed by: Minh Pedraza 12/9/2023 4:27 PM Dictation workstation:   [id number]       Assessment/Plan     84 yo male presenting with intermittent hematuria since Tuesday 12/5. He denies any other associated symptoms including suprapubic or flank pain, fever/chills, nausea/vomiting. He has been getting his liu catheter exchanged by [facility] every 4 weeks as recommended. ER placed a 20 fr 3-way catheter on 12/9. Urine is yellow with sediment and no hematuria since Saturday. Irrigation has not been necessary. UA + Hyaline casts, 2+ blood, 2+ protein, 2+ leukocytes. UC negative. CT A/P showed no acute findings. CT Spine revealed bladder wall thickening, likely cystitis. No leukocytosis. Crt elevated 1.48, baseline 1.06.     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 Lost to follow-up with oncology for metastatic prostate ca. Stopped taking antihormonal therapy  about 2 1/2 months ago on his own. He is willing to have a discussion with oncology regarding prostate cancer treatment.     Plan:   - Maintain liu catheter and continue with exchange every 4 weeks due to chronic urinary retention and atonic bladder  - Treat UTI  - Appreciate nephrology and oncology recommendations  - No acute surgical interventions at this time   - Urology available if needed  - Will follow-up with Dr. Quintanilla in 1-2 weeks     I spent 30 minutes in the professional and overall care of this patient.      12/11/23 at 12:08 PM - NEREIDA Menendez-CNP

## 2023-12-11 NOTE — CONSULTS
"Reason For Consult  \"Patient with severe lumbar and buttock pain; CT with moderate-to-severe spinal canal stenosis at L2-L3 and L4-L5; pt with prostate CA known mets\"    History Of Present Illness  Dennis Galo is an 83 y.o. male presenting to [facility] ED on 12/09/2023, with hematuria he noticed in his chronic urinary catheter. He is admitted with UTI. He has h/o chronic LBP, and requested imaging at admission. He has known bony metastasis. CT L Spine reviewed and demonstrates significant canal narrowing. He states that he does not follow any provider for his back pain. He cannot recall details of cancer treatment.     Past Medical History  He has a past medical history of CHF (congestive heart failure) (CMS/HCC), Chronic indwelling Deal catheter, Chronic renal disease, stage III (CMS/HCC), Diabetes mellitus (CMS/HCC), Eczema, Hiatal hernia, Hypertension, Infarction of spleen, Intracardiac thrombosis, not elsewhere classified, Low back pain, Obesity, Old myocardial infarction, Prostate cancer (CMS/HCC), Pulmonary nodules, Renal cyst, and TIA (transient ischemic attack).    Surgical History  He has a past surgical history that includes Pilonidal cyst drainage (07/26/2018); Cardiac catheterization (2013); and Prostate surgery.     Social History  He reports that he quit smoking about 15 years ago. His smoking use included cigarettes. He has never used smokeless tobacco. He reports that he does not drink alcohol and does not use drugs.    Family History  No family history on file.     Allergies  Terazosin    Review of Systems  ROS x 10 reviewed, he denies other symptoms unless documented in HPI     Physical Exam  Obese male resting in bed. Follows commands.  No apparent distress  A&O x 3, speech is clear / fluent  PERRL, EOMI, no facial droop  Skin is warm / dry; no lesions noted on exposed skin  Thorax is midline, chest expansion is symmetric  Abdomen is not distended  YOUNG in bed, strength BUE, BLE is 5 / " "5  DTRs are 1+ throughout without Mirna's sign, ankle clonus  SILT  Urine catheter in place, clear urine noted in tubin     Last Recorded Vitals  Blood pressure 139/65, pulse 60, temperature 36.7 °C (98.1 °F), resp. rate 17, height 1.778 m (5' 10\"), weight 104 kg (229 lb 4.5 oz), SpO2 96 %.    Relevant Results  CT L Spine 12/09/2023:  When compared to L Spine CT from 2017, appears to have mildly progressed lumbar and foraminal stenosis at L2 - 3 and L4 -5.     CT ABD / PELVIS without IV Contrast on 12/09/2023:  IMPRESSION:  No acute findings in the abdomen and pelvis. Moderate colonic stool.  Signed by: Minh Pedraza    CT CHEST on 12/10/2023:  IMPRESSION:  1.  Improvement of pulmonary nodules presumably from metastatic  disease.    Signed by: Romario Nguyen    Scheduled medications  aspirin, 81 mg, oral, Daily  atorvastatin, 80 mg, oral, Daily  cefTRIAXone, 1 g, intravenous, q24h  gabapentin, 100 mg, oral, TID  isosorbide mononitrate ER, 30 mg, oral, Daily  lisinopril, 20 mg, oral, Daily  metoprolol tartrate, 50 mg, oral, BID  polyethylene glycol, 17 g, oral, Daily  sennosides, 2 tablet, oral, BID  sitaGLIPtin phosphate, 100 mg, oral, Daily  tamsulosin, 0.4 mg, oral, Daily  torsemide, 120 mg, oral, Daily    Continuous medications     PRN medications  PRN medications: acetaminophen, dextrose 10 % in water (D10W), dextrose, glucagon, oxyCODONE, traMADol       Results for orders placed or performed during the hospital encounter of 12/09/23 (from the past 24 hour(s))   Parathyroid Hormone, Intact   Result Value Ref Range    Parathyroid Hormone, Intact 87.9 18.5 - 88.0 pg/mL   Vitamin D 25-Hydroxy,Total (for eval of Vitamin D levels)   Result Value Ref Range    Vitamin D, 25-Hydroxy, Total 22 (L) 30 - 100 ng/mL   Hemoglobin A1C   Result Value Ref Range    Hemoglobin A1C 7.3 (H) see below %    Estimated Average Glucose 163 Not Established mg/dL   Prostate Specific Antigen   Result Value Ref Range    Prostate Specific " AG 4.83 (H) <=4.00 ng/mL   CBC   Result Value Ref Range    WBC 6.5 4.4 - 11.3 x10*3/uL    nRBC 0.0 0.0 - 0.0 /100 WBCs    RBC 4.22 (L) 4.50 - 5.90 x10*6/uL    Hemoglobin 12.8 (L) 13.5 - 17.5 g/dL    Hematocrit 39.5 (L) 41.0 - 52.0 %    MCV 94 80 - 100 fL    MCH 30.3 26.0 - 34.0 pg    MCHC 32.4 32.0 - 36.0 g/dL    RDW 13.1 11.5 - 14.5 %    Platelets 248 150 - 450 x10*3/uL   Renal function panel   Result Value Ref Range    Glucose 42 (LL) 74 - 99 mg/dL    Sodium 139 136 - 145 mmol/L    Potassium 3.5 3.5 - 5.3 mmol/L    Chloride 102 98 - 107 mmol/L    Bicarbonate 30 21 - 32 mmol/L    Anion Gap 11 10 - 20 mmol/L    Urea Nitrogen 21 6 - 23 mg/dL    Creatinine 1.36 (H) 0.50 - 1.30 mg/dL    eGFR 52 (L) >60 mL/min/1.73m*2    Calcium 8.4 (L) 8.6 - 10.3 mg/dL    Phosphorus 3.4 2.5 - 4.9 mg/dL    Albumin 3.4 3.4 - 5.0 g/dL   POCT GLUCOSE   Result Value Ref Range    POCT Glucose 45 (L) 74 - 99 mg/dL   POCT GLUCOSE   Result Value Ref Range    POCT Glucose 89 74 - 99 mg/dL   POCT GLUCOSE   Result Value Ref Range    POCT Glucose 136 (H) 74 - 99 mg/dL   POCT GLUCOSE   Result Value Ref Range    POCT Glucose 129 (H) 74 - 99 mg/dL   POCT GLUCOSE   Result Value Ref Range    POCT Glucose 138 (H) 74 - 99 mg/dL   POCT GLUCOSE   Result Value Ref Range    POCT Glucose 213 (H) 74 - 99 mg/dL   POCT GLUCOSE   Result Value Ref Range    POCT Glucose 81 74 - 99 mg/dL          Assessment/Plan   82 yo male with chronic LBP, lumbar canal stenosis. We are consulted given patient's history of prostate cancer. He had nuclear body scan last in 07/2022, with only evidence of right hemipelvis involvement.   Discussed with Dr. Errol Serrano:  - Dr Serrano reports that he interviewed patient on 12/10/2023, and decision was for no further imaging during this admission.   - If Mr. Galo chooses, he can follow up with me in our [facility] for further evaluation / treatment of lumbar stenosis. Discussed with patient who is agreeable  with Plan.      I spent > 35 minutes in the professional and overall care of this patient.      Kylah Worrell, APRN-CNP

## 2023-12-11 NOTE — PROGRESS NOTES
"Internal Medicine Progress Note       PATIENT NAME: Dennis Galo  MRN: [id number]  DATE: 12/11/2023          Subjective      No acute complaints this AM, did have some clots in his liu yesterday currently draining straw colored urine. Pt endorsed he has stopped taking most of his medications at home. He states he is \"taking too many pills\" he denies any overt depressive symptoms and is motivated to \"start taking care of his health again.\" I discussed case with TCC who will reach out to  regarding medication noncompliance           Objective      /65 (BP Location: Right arm, Patient Position: Lying)   Pulse 60   Temp 36.7 °C (98.1 °F) (Temporal)   Resp 17   Ht 1.778 m (5' 10\")   Wt 104 kg (229 lb 4.5 oz)   SpO2 96%   BMI 32.90 kg/m²     General: Pleasant and cooperative  HEENT: Normocephalic, atraumatic, mucus membranes moist.  Eyes: EOMI  Neck:  Trachea midline.    Chest: Symmetric chest expansion, CTA bilaterally , healing zoster R chest   CV:  Regular rate, regular rhythm.  Positive S1/S2.  Abdomen: soft, non-tender, non-distended, no guarding or peritoneal signs   : Liu in place, draining straw-colored urine  Extremities:  No lower extremity edema, + venous stasis   Neurological:  no obvious focal deficits   Psych: appropriate affect and behavior   Skin:  Warm and dry          Assessment / Plan     Pt is an 83-year-old male with history of prostate cancer and chronic urinary retention who presents to [facility] with hematuria     # Gross hematuria, resolved   # History of bladder cancer with mets to bone (confirmed on biopsy), pulmonary nodules, Rec radiation therapy and hormonal manipulation therapy follows with urology Dr. Quintanilla, has seen Dr. Allen (heme/onc)  # UTI  # Chronic lower back pain, moderate to severe stenosis  # DM II with medication noncompliance, hypoglycemic events      # HFrEF LVEF 40 to 45% 2019  # CKD baseline creatinine around 1.6  # DM type II  # Medication noncompliance " "      Ucx without significant growth. Pt is  Nontoxic appearing, afebrile nontachycardic nontachypneic no leukocytosis no concern for sepsis type picture. Will finish day 3 of Rocephin today and will DC abx   Patient hypoglycemic a.m. of 12/10/2023 despite being on less than half of his home dose Lantus-he had no symptoms during this event.  He is on sulfonylurea at home told me he he was on Lantus 35 units nightly. He denied taking his BG at home.  He has had hypoglycemic events in the past as well.  Suspect his sulfonylurea will need to be discontinued on eventual discharge and his insulin regimen will need to be titrated.  Endocrinology consulted for further recommendations.  Consulted urology for hematuria recommendation, Liu exchanged in ER currently draining clear urine noted clots in Liu upon exchange on admission 12/9 per report. Pt follows with Dr. Quintanilla outpatient for chronic liu in the setting of hx of prostate CA.  He was on Xtandi and Lupron, Xgeva -  however has stopped Xtandi on his own.  He did see hematology oncology in the past Dr. Allen, note patient has had poor medication compliance.  CT high res chest with improvement in pulmonary nodules, PSA pending. Consider heme/onc consult as pt has not followed up with physicians previously and has stopped taking his medications on his own without guidance of his physicians.    Patient is on high dose of Torsemide at home he has CKD however has not seen nephrology in \"many years\" he has also lost follow Will consult nephrologist for further recs regarding diuretic dosing. Cr level appears to be similar since October, don't suspect any LAURA type picture at this time. Cont home lisinopril.  Home dose was actually 120 mg daily which was continued by nephrology appreciate recommendation  Obtain repeat echocardiogram - previously followed with cardiology has lost follow up   Continue home medications as indicated, insulin therapy titrated as indicated  Pt " with lower back pain, no red flag symptoms - CT L-spine with moderate to severe stenosis, patient with prostate cancer surgery with known mets - Patient discussed with neurosurgery and decided not to pursue any further imaging regarding CT L spine findings. Can follow up with NSG utpatient if he chooses   Holding pharmacologic DVT prophylaxis at this time in the setting of hematuria  Pt with Zoster R chest ~6 months ago, was Rx antivirals however never finished course, rash resolving almost completely healed will hold off on further tx given side effect profile         Last BM date    DVT Prophylaxis: SCDs, holding pharmacologic dvt ppx in the setting of hematuria   Full code confirmed on admission      Discharge Planning:  Will need follow-up with urology on discharge  Will need to re-establish care with Nephro, ophtho, Endo, Cardio, heme/onc, NSG if he so chooses

## 2023-12-11 NOTE — PROGRESS NOTES
"    Endocrinology Inpatient Consult Progress Note     PATIENT NAME: Dennis Galo  MRN: [id number]  DATE: 12/11/2023    CONSULTING PHYSICIAN: Dr. MARISOL Dowell.  REASON FOR CONSULT: Uncontrolled T2DM.      Interval Events     No acute events.  He has no new complaints.  He was endorsing the use of a supplement called \"rejuvenate\" which was giving him more energy at home to exercise.      Physical Examination     /65 (BP Location: Right arm, Patient Position: Lying)   Pulse 60   Temp 36.7 °C (98.1 °F) (Temporal)   Resp 17   Ht 1.778 m (5' 10\")   Wt 104 kg (229 lb 4.5 oz)   SpO2 96%   BMI 32.90 kg/m²   Physical Exam:  General:  Pleasant and cooperative. No apparent distress.  HEENT:  Normocephalic, atraumatic, mucus membranes moist.  Neck:  Trachea midline.  Neck supple.   Chest:  Clear to auscultation bilaterally. No wheezes, rales, or rhonchi.  CV:  Regular rate and rhythm.  Positive S1/S2. No murmur, no gallops, no rubs  GI: Bowel sounds present in all four quadrants, abdomen is soft, non-tender, non-distended.  Extremities:  No lower extremity edema.  Venous stasis changes.  Neurological: Moving extremities.  Skin:  Warm and dry.      Medications     Reviewed MAR       Data     Recent Labs and Imaging Reviewed      Date  PreBreakfast Pre Lunch Pre Dinner Bedtime 3AM    12/10 89, 136 129 138 213    12/11 75 116                            Assessment / Plan        # Uncontrolled Type 2 Diabetes Mellitus  Home Regimen: Glipizide 20 mg Daily (admittedly patient is unclear of his regimen).  Hemoglobin A1c (12/23): 7.3%  Nutrition: CHO Controlled        He is not on any insulin currently  His blood sugars are at goal.  Continue Januvia 100 mg daily.   Do not believe he needs to continue glipizide.  Avoid hypoglycemia.       # Stage III CKD: We will have to keep this in mind given that he is on glipizide.     # Hypertension: Patient is only on Imdur at home.  Query the need for an ACE inhibitor given his HFrEF.  " Urine albumin creatinine ratio is pending.  I suspect he will have 2 indications for a ACE/ARB.  Lisinopril 20 mg has been added by the primary team.     # HFrEF: Unfortunately given his tenuous renal function, I am not too comfortable putting him on an SGLT2 inhibitor.     # Dyslipidemia: Continue atorvastatin 80 mg daily.  Obtain lipids.     # Class III Obesity: Perhaps he would benefit from a GLP-1 receptor analog which is not renally cleared.              Saad Sood D.O.  Internal Medicine PGY-3      This is a preliminary note with physician attestation to follow.

## 2023-12-11 NOTE — PROGRESS NOTES
"NEPHROLOGY PROGRESS NOTE    Patient seen and examined, no acute events overnight, improved hematuria, liu +, concerned about his treatment plan, tolerating po, improved edema    Scheduled medications  aspirin, 81 mg, oral, Daily  atorvastatin, 80 mg, oral, Daily  gabapentin, 100 mg, oral, TID  isosorbide mononitrate ER, 30 mg, oral, Daily  lisinopril, 20 mg, oral, Daily  metoprolol tartrate, 50 mg, oral, BID  polyethylene glycol, 17 g, oral, Daily  sennosides, 2 tablet, oral, BID  sitaGLIPtin phosphate, 100 mg, oral, Daily  tamsulosin, 0.4 mg, oral, Daily  torsemide, 120 mg, oral, Daily      Continuous medications     PRN medications  PRN medications: acetaminophen, dextrose 10 % in water (D10W), dextrose, glucagon, oxyCODONE, traMADol     Blood pressure 106/59, pulse 60, temperature 36.4 °C (97.5 °F), temperature source Temporal, resp. rate 18, height 1.778 m (5' 10\"), weight 104 kg (229 lb 4.5 oz), SpO2 94 %.      Intake/Output Summary (Last 24 hours) at 12/11/2023 1650  Last data filed at 12/11/2023 0155  Gross per 24 hour   Intake --   Output 1750 ml   Net -1750 ml       General: No apparent distress  Respiratory: Diminished breath sounds bilaterally  CVS: No rub  Abdomen: Soft  Lower extremities: Chronic nonpitting edema  : Clear yellow urine in the Liu tubing but does have blood-tinged urine in the bag    Hb 12.8  Cr 1.36, K 3.5, HCO 30  PTH and Vit D pending  CT chest improvement of metastatic nodules  PSA pending    Impression:    CKD stage 3, baseline creatinine 1-1.5  Volume overload  BL renal cysts  Hematuria, underlying metastatic prostate ca    Plan  - creatinine back to baseline, continue supportive care  - intermittent and now resolved hematuria, oncology input   - decrease torsemide to 80 mg daily  - give lisinopril and lopressor with holding parameters  - trend     MARS reviewed, dose for GFR<30    Anca PATRICIA Fowler MD PhD      "

## 2023-12-11 NOTE — PROGRESS NOTES
Physical Therapy    Physical Therapy Evaluation    Patient Name: Dennis Galo  MRN: [id number]  Today's Date: 12/11/2023   Time Calculation  Start Time: 1150  Stop Time: 1205  Time Calculation (min): 15 min    Assessment/Plan   PT Assessment  End of Session Patient Position: Bed, 2 rail up, Alarm off, not on at start of session  IP OR SWING BED PT PLAN  Inpatient or Swing Bed: Inpatient  PT Plan  PT Plan: PT Eval only  PT Eval Only Reason: At baseline function  PT Discharge Recommendations: No PT needed after discharge  PT - OK to Discharge: Yes      Subjective   General Visit Information:  General  Reason for Referral:  (hematuria, cathetrer changed 3 weeks ago, lbp)  Past Medical History Relevant to Rehab:  (tia, cvd, splenic infarct, prostate and renal ca stopped antihormal therapy,)  Patient Position Received: Bed, 2 rail up, Alarm on  Home Living:  Home Living  Type of Home: House (1 step in hr)  Lives With:  (wife)  Prior Level of Function:  Prior Function Per Pt/Caregiver Report  Level of Bryan:  (indep w/ rw)  Homemaking Assistance:  (per wife, pt drives)  Precautions:  Precautions  Precautions Comment:  (falls liu cath)  Vital Signs:       Objective   Pain:  Pain Assessment  Pain Assessment:  (0/10)  Cognition:           Activity Tolerance  Endurance:  (good)  Functional Assessments:  Bed Mobility  Bed Mobility:  (sba oob, pt struggles which he says is his baseline, indep back to bed)    Transfers  Transfer:  (indep)    Ambulation/Gait Training  Ambulation/Gait Training Performed:  (mod indep/s w/ rw 140ft steady on feet)  Extremity/Trunk Assessments:  RLE   RLE : Within Functional Limits  LLE   LLE : Within Functional Limits  Outcome Measures:  [facility] Basic Mobility  Turning from your back to your side while in a flat bed without using bedrails: None  Moving from lying on your back to sitting on the side of a flat bed without using bedrails: None  Moving to and from bed to chair (including a  wheelchair): None  Standing up from a chair using your arms (e.g. wheelchair or bedside chair): None  To walk in hospital room: None  Climbing 3-5 steps with railing: None  Basic Mobility - Total Score: 24    Encounter Problems

## 2023-12-11 NOTE — PROGRESS NOTES
therapy intervention  Precautions:  Precautions Comment: liu, telemetry, prostate ca with mets  Vital Signs:     Pain:       Objective   Cognition:  Overall Cognitive Status: Within Functional Limits (a&o x 3)           Home Living:  Home Living Comments: pt. lives with spouse, 1 floor home, 1 step entry with rail, tub/shower with gb/seat, wh. walker  Prior Function:  Prior Function Comments: pt. using wh. walker x past 3 months, requires assist for bathing of his back and BLE's, assist for LB dressing socks/shoes, spouse assists.  spouse does iadl's primarily, pt. drives short distances otherwise his nephew and brothers assist with driving longer distances.  pt. able to ambulate  via wh. walker independently  IADL History:     ADL:  ADL Comments: max assist to don/dof socks estimated per patient report, may benefit from a/e training to promote independence with adl tasks  Activity Tolerance:     Bed Mobility/Transfers: Bed Mobility  Bed Mobility:  (supervision supine to sit with effort and cues, mod i sit to supine)    Transfers  Transfer:  (supervision with cues for hand placement sit<> stand from eob)      Ambulation/Gait Training:  Ambulation/Gait Training  Ambulation/Gait Training Performed:  (supervision via wh. walker)     Strength:  Strength Comments: aracelis's strength wfl    Outcome Measures:[facility] Daily Activity  Putting on and taking off regular lower body clothing: A lot  Bathing (including washing, rinsing, drying): A lot  Putting on and taking off regular upper body clothing: A little  Toileting, which includes using toilet, bedpan or urinal: A little  Taking care of personal grooming such as brushing teeth: None  Eating Meals: None  Daily Activity - Total Score: 18        Education Documentation  ADL Training, taught by Isabel Hampton OT at 12/11/2023 12:39 PM.  Learner: Patient  Readiness: Acceptance  Method: Explanation  Response: Verbalizes Understanding, Needs Reinforcement    Education Comments  No  comments found.        OP EDUCATION:       Goals:  Encounter Problems       Encounter Problems (Active)       OT Goals       Increase functional transfers to supervision for chair/toilet with dme prn   (Progressing)       Start:  12/11/23    Expected End:  12/18/23            Increase functional mobility to supervision for access to bathroom/kitchen  (Progressing)       Start:  12/11/23    Expected End:  12/18/23            Increase lb dressing/bathing to supervision with dme prn  (Progressing)       Start:  12/11/23    Expected End:  12/18/23            increase bue ther ex/activity x 7-10 minutes and increase standing tolerance to supervision x 3-5 minutes to promote greater activity tolerance for assist with adl.   (Progressing)       Start:  12/11/23    Expected End:  12/18/23            Increase toileting to supervision with dme prn  (Progressing)       Start:  12/11/23    Expected End:  12/18/23

## 2023-12-12 ENCOUNTER — DOCUMENTATION (OUTPATIENT)
Dept: CASE MANAGEMENT | Facility: HOSPITAL | Age: 83
End: 2023-12-12

## 2023-12-12 VITALS
WEIGHT: 229.28 LBS | TEMPERATURE: 96.8 F | BODY MASS INDEX: 32.82 KG/M2 | OXYGEN SATURATION: 93 % | RESPIRATION RATE: 18 BRPM | HEIGHT: 70 IN | HEART RATE: 66 BPM | DIASTOLIC BLOOD PRESSURE: 56 MMHG | SYSTOLIC BLOOD PRESSURE: 107 MMHG

## 2023-12-12 LAB
ALBUMIN SERPL BCP-MCNC: 3 G/DL (ref 3.4–5)
ANION GAP SERPL CALC-SCNC: 10 MMOL/L (ref 10–20)
BUN SERPL-MCNC: 25 MG/DL (ref 6–23)
CALCIUM SERPL-MCNC: 7.9 MG/DL (ref 8.6–10.3)
CHLORIDE SERPL-SCNC: 98 MMOL/L (ref 98–107)
CO2 SERPL-SCNC: 30 MMOL/L (ref 21–32)
CREAT SERPL-MCNC: 1.7 MG/DL (ref 0.5–1.3)
ERYTHROCYTE [DISTWIDTH] IN BLOOD BY AUTOMATED COUNT: 13 % (ref 11.5–14.5)
GFR SERPL CREATININE-BSD FRML MDRD: 40 ML/MIN/1.73M*2
GLUCOSE BLD MANUAL STRIP-MCNC: 154 MG/DL (ref 74–99)
GLUCOSE BLD MANUAL STRIP-MCNC: 162 MG/DL (ref 74–99)
GLUCOSE SERPL-MCNC: 146 MG/DL (ref 74–99)
HCT VFR BLD AUTO: 37.8 % (ref 41–52)
HGB BLD-MCNC: 12.3 G/DL (ref 13.5–17.5)
MCH RBC QN AUTO: 30.4 PG (ref 26–34)
MCHC RBC AUTO-ENTMCNC: 32.5 G/DL (ref 32–36)
MCV RBC AUTO: 93 FL (ref 80–100)
NRBC BLD-RTO: 0 /100 WBCS (ref 0–0)
PHOSPHATE SERPL-MCNC: 3.6 MG/DL (ref 2.5–4.9)
PLATELET # BLD AUTO: 204 X10*3/UL (ref 150–450)
POTASSIUM SERPL-SCNC: 4.1 MMOL/L (ref 3.5–5.3)
RBC # BLD AUTO: 4.05 X10*6/UL (ref 4.5–5.9)
SODIUM SERPL-SCNC: 134 MMOL/L (ref 136–145)
WBC # BLD AUTO: 10.3 X10*3/UL (ref 4.4–11.3)

## 2023-12-12 PROCEDURE — G0378 HOSPITAL OBSERVATION PER HR: HCPCS

## 2023-12-12 PROCEDURE — 2500000001 HC RX 250 WO HCPCS SELF ADMINISTERED DRUGS (ALT 637 FOR MEDICARE OP): Performed by: INTERNAL MEDICINE

## 2023-12-12 PROCEDURE — 82947 ASSAY GLUCOSE BLOOD QUANT: CPT | Mod: 59

## 2023-12-12 PROCEDURE — 80069 RENAL FUNCTION PANEL: CPT | Performed by: STUDENT IN AN ORGANIZED HEALTH CARE EDUCATION/TRAINING PROGRAM

## 2023-12-12 PROCEDURE — 2500000004 HC RX 250 GENERAL PHARMACY W/ HCPCS (ALT 636 FOR OP/ED): Performed by: NURSE PRACTITIONER

## 2023-12-12 PROCEDURE — 82947 ASSAY GLUCOSE BLOOD QUANT: CPT

## 2023-12-12 PROCEDURE — 2500000001 HC RX 250 WO HCPCS SELF ADMINISTERED DRUGS (ALT 637 FOR MEDICARE OP): Performed by: STUDENT IN AN ORGANIZED HEALTH CARE EDUCATION/TRAINING PROGRAM

## 2023-12-12 PROCEDURE — 90662 IIV NO PRSV INCREASED AG IM: CPT | Performed by: NURSE PRACTITIONER

## 2023-12-12 PROCEDURE — 99239 HOSP IP/OBS DSCHRG MGMT >30: CPT | Performed by: NURSE PRACTITIONER

## 2023-12-12 PROCEDURE — 36415 COLL VENOUS BLD VENIPUNCTURE: CPT | Performed by: STUDENT IN AN ORGANIZED HEALTH CARE EDUCATION/TRAINING PROGRAM

## 2023-12-12 PROCEDURE — 85027 COMPLETE CBC AUTOMATED: CPT | Performed by: STUDENT IN AN ORGANIZED HEALTH CARE EDUCATION/TRAINING PROGRAM

## 2023-12-12 PROCEDURE — 2500000004 HC RX 250 GENERAL PHARMACY W/ HCPCS (ALT 636 FOR OP/ED): Performed by: INTERNAL MEDICINE

## 2023-12-12 PROCEDURE — 2500000004 HC RX 250 GENERAL PHARMACY W/ HCPCS (ALT 636 FOR OP/ED): Performed by: STUDENT IN AN ORGANIZED HEALTH CARE EDUCATION/TRAINING PROGRAM

## 2023-12-12 PROCEDURE — G0008 ADMIN INFLUENZA VIRUS VAC: HCPCS | Performed by: NURSE PRACTITIONER

## 2023-12-12 RX ORDER — TORSEMIDE 20 MG/1
40 TABLET ORAL DAILY
Status: DISCONTINUED | OUTPATIENT
Start: 2023-12-13 | End: 2023-12-12 | Stop reason: HOSPADM

## 2023-12-12 RX ORDER — OXYCODONE HYDROCHLORIDE 5 MG/1
5 TABLET ORAL EVERY 6 HOURS PRN
Qty: 10 TABLET | Refills: 0 | Status: SHIPPED | OUTPATIENT
Start: 2023-12-12 | End: 2023-12-16 | Stop reason: HOSPADM

## 2023-12-12 RX ADMIN — ASPIRIN 81 MG CHEWABLE TABLET 81 MG: 81 TABLET CHEWABLE at 09:00

## 2023-12-12 RX ADMIN — ATORVASTATIN CALCIUM 80 MG: 80 TABLET, FILM COATED ORAL at 09:00

## 2023-12-12 RX ADMIN — TAMSULOSIN HYDROCHLORIDE 0.4 MG: 0.4 CAPSULE ORAL at 09:00

## 2023-12-12 RX ADMIN — POLYETHYLENE GLYCOL 3350 17 G: 17 POWDER, FOR SOLUTION ORAL at 09:01

## 2023-12-12 RX ADMIN — METOPROLOL TARTRATE 50 MG: 50 TABLET, FILM COATED ORAL at 09:00

## 2023-12-12 RX ADMIN — OXYCODONE HYDROCHLORIDE 5 MG: 5 TABLET ORAL at 12:09

## 2023-12-12 RX ADMIN — STANDARDIZED SENNA CONCENTRATE 17.2 MG: 8.6 TABLET ORAL at 09:00

## 2023-12-12 RX ADMIN — TRAMADOL HYDROCHLORIDE 50 MG: 50 TABLET, COATED ORAL at 08:55

## 2023-12-12 RX ADMIN — ISOSORBIDE MONONITRATE 30 MG: 30 TABLET, EXTENDED RELEASE ORAL at 09:00

## 2023-12-12 RX ADMIN — INFLUENZA A VIRUS A/VICTORIA/4897/2022 IVR-238 (H1N1) ANTIGEN (FORMALDEHYDE INACTIVATED), INFLUENZA A VIRUS A/DARWIN/9/2021 SAN-010 (H3N2) ANTIGEN (FORMALDEHYDE INACTIVATED), INFLUENZA B VIRUS B/PHUKET/3073/2013 ANTIGEN (FORMALDEHYDE INACTIVATED), AND INFLUENZA B VIRUS B/MICHIGAN/01/2021 ANTIGEN (FORMALDEHYDE INACTIVATED) 0.7 ML: 60; 60; 60; 60 INJECTION, SUSPENSION INTRAMUSCULAR at 11:04

## 2023-12-12 RX ADMIN — TORSEMIDE 80 MG: 20 TABLET ORAL at 09:00

## 2023-12-12 RX ADMIN — GABAPENTIN 100 MG: 100 CAPSULE ORAL at 09:00

## 2023-12-12 RX ADMIN — SITAGLIPTIN 100 MG: 50 TABLET, FILM COATED ORAL at 09:00

## 2023-12-12 RX ADMIN — LISINOPRIL 20 MG: 20 TABLET ORAL at 09:00

## 2023-12-12 ASSESSMENT — PAIN SCALES - GENERAL
PAINLEVEL_OUTOF10: 3
PAINLEVEL_OUTOF10: 8
PAINLEVEL_OUTOF10: 8

## 2023-12-12 ASSESSMENT — PAIN - FUNCTIONAL ASSESSMENT
PAIN_FUNCTIONAL_ASSESSMENT: 0-10

## 2023-12-12 ASSESSMENT — PAIN DESCRIPTION - LOCATION: LOCATION: GENERALIZED

## 2023-12-12 ASSESSMENT — PAIN DESCRIPTION - DESCRIPTORS
DESCRIPTORS: ACHING
DESCRIPTORS: ACHING

## 2023-12-12 NOTE — PROGRESS NOTES
TCC Note: Unable to obtain from pt, information about the [facility] company that wife is already set up with, as pt was sleeping. Called wife to obtain that information and had to leave a voicemail. Also called other Family member listed and also had to leave a voicemail. Obtained the name of [facility] which wife is active with. Message NP to please put Internal order in for [facility]. Pt does have a written discharge to go home today. Will follow until discharge. Myriam Figueroa, MSN, RN, TCC.

## 2023-12-12 NOTE — PROGRESS NOTES
"    Endocrinology Inpatient Consult Progress Note     PATIENT NAME: Dennis Galo  MRN: [id number]  DATE: 12/12/2023    CONSULTING PHYSICIAN: Dr. MARISOL Dowell.  REASON FOR CONSULT: Uncontrolled T2DM.      Interval Events     No acute events overnight.  Patient states he is sick of taking bunch of pills.  He denies any nausea or vomiting.  Tolerating his meals.  He is happy with his blood sugar control possible.  Again he told me he would not check his sugars at home.    Patient had a bunch of pills at his bedside.  One of them was levothyroxine.      Physical Examination     /56   Pulse 66   Temp 36 °C (96.8 °F)   Resp 18   Ht 1.778 m (5' 10\")   Wt 104 kg (229 lb 4.5 oz)   SpO2 93%   BMI 32.90 kg/m²   General: No acute distress.   CV: Normal rate and rhythm. No murmurs or rubs auscultated.   Resp: CTA b/l. No wheezing or crackles.   Abdomen: Soft, nontender, nondistended obese abdomen. BSx4.   Extremities: No pedal edema b/l.  : Deal draining yellow urine.  Psych: Appropriate affect      Medications     Reviewed MAR       Data     Recent Labs and Imaging Reviewed    Date  PreBreakfast Pre Lunch Pre Dinner Bedtime 3AM    12/10 89, 136 129 138 213    12/11 75 116 190 228    12/12 154                   Assessment / Plan        # Uncontrolled Type 2 Diabetes Mellitus  Home Regimen: Glipizide 20 mg Daily (Unclear if Accurate, Pt Does Not Know.  Hemoglobin A1c (12/23): 7.3%  Nutrition: CHO Controlled    Sugars are at goal on DPP 4 inhibitor.  No glipizide on discharge given hypoglycemia       - Cont sitagliptin 100mg daily     - No SFU for now    - POC glucose TID AC + HS    - No SSI for now     # Stage III CKD: We will have to keep this in mind given that he is on glipizide.     # Hypertension: Patient is only on Imdur at home.  Query the need for an ACE inhibitor given his HFrEF.  Urine albumin creatinine ratio has not been collected.  He has two indications for ACEi/ARB. Primary team has added lisinopril " 20mg daily.     # HFrEF: Unfortunately given his tenuous renal function, I am not too comfortable putting him on an SGLT2 inhibitor.     # Dyslipidemia: Continue atorvastatin 80 mg daily.  Obtain lipids.     # Class III Obesity: Perhaps he would benefit from a GLP-1 receptor analog which is not renally cleared.       Dmitri Bhatt, DO  Endocrinology, Diabetes, and Metabolism    Available via EP[employer] Messenger    Please excuse and typographical or unwanted errors within this documentation as voice recognition software was used to dictate this note.

## 2023-12-12 NOTE — DISCHARGE SUMMARY
"Hospital Course  Dennis Galo is an 83 y.o. male with past medical history of CAD s/p CABG,  HFrEF, recurrent urinary retention w/chronic Deal, CKD, and prostate cancer who presented with hematuria. He stopped taking his medications on his own without guidance of his physicians.  ED workup was benign; his catheter was exchanged. Urine cx was negative; he completed a 3-day course of Rocephin. Endocrinology was consulted for diabetes and his regimen was adjusted; he will follow up outpatient. He was seen by urology; his hematuria has resolved and he will follow up with Dr. Quintanilla. He had stopped Xtandi on his own. He was seen by oncology and will follow up. He was seen by nephrology for diuretic dosing as he had not seen them in \"many years\". He had no LAURA this admission. Neurosurgery saw him for moderate to severe lumbar stenosis; he will follow up PRN. He is stable to discharge home today. He says he is not sure if he needs prescriptions; he was provided them for sitagliptin and torsemide.     Subjective  Pt is indifferent when asked how he feels, says \"I feel the same\". Despite telling the discharge planner that he wants HHC, today he says \"I don't know\" when asked. He says the same when asked if he needs prescriptions. Several hours after my exam, after discharge orders were put in, he was angry that his low back pain wasn't addressed before he left. At the bedside he is c/o 10/10 pain, insisting he told me about it earlier. He is requesting a \"shot\" and is upset when told he'll get an oral pain med now and to go home with.    Objective    Vitals:    12/12/23 0325   BP: 107/56   Pulse: 66   Resp:    Temp: 36 °C (96.8 °F)   SpO2: 93%       Vitals:    12/09/23 1853   Weight: 104 kg (229 lb 4.5 oz)       Results for orders placed or performed during the hospital encounter of 12/09/23 (from the past 24 hour(s))   Transthoracic Echo (TTE) Complete   Result Value Ref Range    AV pk erica 0.96     AV mn grad 2.0     " LVOT diam 2.30     LV biplane EF 40     MV E/A ratio 1.73     LA vol index A/L 22.4     Tricuspid annular plane systolic excursion 2.4     RV free wall pk S' 12.70     LVIDd 4.87     Aortic Valve Area by Continuity of VTI 4.18     Aortic Valve Area by Continuity of Peak Velocity 3.72     AV pk grad 3.6     LV A4C EF 45.4    POCT GLUCOSE   Result Value Ref Range    POCT Glucose 116 (H) 74 - 99 mg/dL   POCT GLUCOSE   Result Value Ref Range    POCT Glucose 190 (H) 74 - 99 mg/dL   POCT GLUCOSE   Result Value Ref Range    POCT Glucose 228 (H) 74 - 99 mg/dL   POCT GLUCOSE   Result Value Ref Range    POCT Glucose 154 (H) 74 - 99 mg/dL   CBC   Result Value Ref Range    WBC 10.3 4.4 - 11.3 x10*3/uL    nRBC 0.0 0.0 - 0.0 /100 WBCs    RBC 4.05 (L) 4.50 - 5.90 x10*6/uL    Hemoglobin 12.3 (L) 13.5 - 17.5 g/dL    Hematocrit 37.8 (L) 41.0 - 52.0 %    MCV 93 80 - 100 fL    MCH 30.4 26.0 - 34.0 pg    MCHC 32.5 32.0 - 36.0 g/dL    RDW 13.0 11.5 - 14.5 %    Platelets 204 150 - 450 x10*3/uL   Renal function panel   Result Value Ref Range    Glucose 146 (H) 74 - 99 mg/dL    Sodium 134 (L) 136 - 145 mmol/L    Potassium 4.1 3.5 - 5.3 mmol/L    Chloride 98 98 - 107 mmol/L    Bicarbonate 30 21 - 32 mmol/L    Anion Gap 10 10 - 20 mmol/L    Urea Nitrogen 25 (H) 6 - 23 mg/dL    Creatinine 1.70 (H) 0.50 - 1.30 mg/dL    eGFR 40 (L) >60 mL/min/1.73m*2    Calcium 7.9 (L) 8.6 - 10.3 mg/dL    Phosphorus 3.6 2.5 - 4.9 mg/dL    Albumin 3.0 (L) 3.4 - 5.0 g/dL       Constitutional: Well developed, awake, alert, oriented x3, no acute distress, cooperative   Eyes: EOMI, clear sclera   ENMT: mucous membranes moist, no lesions seen   Head/Neck: Neck supple, no apparent injury, head atraumatic   Respiratory/Thorax: CTAB, good chest expansion, respirations even and unlabored   Cardiovascular: Regular rate and rhythm, no murmurs/rubs/gallops, normal S1 and S 2   Gastrointestinal: Abdomen nondistended, soft, nontender, +BS, no bruits   Musculoskeletal: ROM  intact, no joint swelling, normal  strength   Extremities: no cyanosis, edema, contusions or clubbing   Neurological: no focal deficit, pt alert and oriented x3   Psychological: Indifferent affect and behavior   Skin: Warm and dry, no lesions, no rashes  Genitourinary: Deal draining clear light yellow urine       Past Medical History:   Diagnosis Date    CAD (coronary artery disease)     CHF (congestive heart failure) (CMS/[facility])     EF 40-45%    Chronic indwelling Deal catheter     Chronic renal disease, stage III (CMS/HCC)     Diabetes mellitus (CMS/HCC)     Eczema     Hiatal hernia     Hypertension     Infarction of spleen     Intracardiac thrombosis, not elsewhere classified     Apical mural thrombus    Old myocardial infarction     Prostate cancer (CMS/HCC)     Pulmonary nodules     Renal cyst     bilateral    TIA (transient ischemic attack)            Your medication list        START taking these medications        Instructions Last Dose Given Next Dose Due   sitaGLIPtin phosphate 100 mg tablet  Commonly known as: Januvia      Take 1 tablet (100 mg) by mouth once daily.              CHANGE how you take these medications        Instructions Last Dose Given Next Dose Due   torsemide 40 mg tablet  What changed:   medication strength  how much to take  Another medication with the same name was removed. Continue taking this medication, and follow the directions you see here.      Take 80 mg by mouth once daily.              CONTINUE taking these medications        Instructions Last Dose Given Next Dose Due   ammonium lactate 12 % cream  Commonly known as: Amlactin           aspirin 81 mg chewable tablet           atorvastatin 80 mg tablet  Commonly known as: Lipitor           gabapentin 100 mg capsule  Commonly known as: Neurontin           isosorbide mononitrate ER 30 mg 24 hr tablet  Commonly known as: Imdur           LEUPROLIDE (BULK) MISC           lisinopril 20 mg tablet      TAKE 1 TABLET BY MOUTH EVERY  "DAY       metoprolol tartrate 100 mg tablet  Commonly known as: Lopressor           nystatin-triamcinolone ointment  Commonly known as: Mycolog II           OneTouch UltraSoft Lancets misc  Generic drug: lancets           pen needle, diabetic 32 gauge x 5/32\" needle      Use one daily       tamsulosin 0.4 mg 24 hr capsule  Commonly known as: Flomax           Xtandi 40 mg tablet  Generic drug: enzalutamide                  STOP taking these medications      glipiZIDE 10 mg tablet  Commonly known as: Glucotrol        Lantus Solostar U-100 Insulin 100 unit/mL (3 mL) pen  Generic drug: insulin glargine        OneTouch Ultra Test strip  Generic drug: blood sugar diagnostic        valACYclovir 1 gram tablet  Commonly known as: Valtrex                  Where to Get Your Medications        These medications were sent to [facility]/pharmacy #[id number] - [address]  [address] [place], [place] [address]      Phone: [phone]   sitaGLIPtin phosphate 100 mg tablet  torsemide 40 mg tablet         Future Appointments   Date Time Provider Department Center   1/4/2024 11:30 AM Castro Quintanilla MD [facility]   1/11/2024  2:30 PM Triston Samson MD [id number] [place]         Leana Kat, CNP  Hospital Medicine    "

## 2023-12-12 NOTE — CARE PLAN
The patient's goals for the shift include to have no blood with urine output through EOS    The clinical goals for the shift include monitor output during shift.

## 2023-12-12 NOTE — PROGRESS NOTES
TCC Note: Received Secure chat from NP regarding pt being discharged home today with HHC. Informed SW that NP/MD want to discharge pt home today and inquired about SW conversation with pt yesterday about HHC choice. SW to follow up with pt this morning and referrals will be sent. Will follow until discharge. Myriam Figueroa, MSN, RN, TCC.

## 2023-12-12 NOTE — PROGRESS NOTES
"NEPHROLOGY PROGRESS NOTE    Patient seen and examined, no acute events overnight, improved hematuria, liu +, concerned about his treatment plan and wants to go home, tolerating po, persistent pain    Scheduled medications  aspirin, 81 mg, oral, Daily  atorvastatin, 80 mg, oral, Daily  gabapentin, 100 mg, oral, TID  isosorbide mononitrate ER, 30 mg, oral, Daily  lisinopril, 20 mg, oral, Daily  metoprolol tartrate, 50 mg, oral, BID  polyethylene glycol, 17 g, oral, Daily  sennosides, 2 tablet, oral, BID  sitaGLIPtin phosphate, 100 mg, oral, Daily  tamsulosin, 0.4 mg, oral, Daily  torsemide, 80 mg, oral, Daily      Continuous medications     PRN medications  PRN medications: acetaminophen, dextrose 10 % in water (D10W), dextrose, glucagon, oxyCODONE, traMADol     Blood pressure 107/56, pulse 66, temperature 36 °C (96.8 °F), resp. rate 18, height 1.778 m (5' 10\"), weight 104 kg (229 lb 4.5 oz), SpO2 93 %.      Intake/Output Summary (Last 24 hours) at 12/12/2023 1244  Last data filed at 12/12/2023 0013  Gross per 24 hour   Intake --   Output 1900 ml   Net -1900 ml       General: No apparent distress  Respiratory: Diminished breath sounds bilaterally  CVS: No rub  Abdomen: Soft  Lower extremities: Chronic nonpitting edema  : Clear yellow urine in the Liu tubing but does have blood-tinged urine in the bag    Hb 12.8  Cr 1.36, K 3.5, HCO 30  PTH and Vit D pending  CT chest improvement of metastatic nodules  PSA pending    Impression:    LAURA on CKD stage 3, baseline creatinine 1-1.5 now up to 1.7  Volume overload  BL renal cysts  Hematuria, underlying metastatic prostate ca    Plan  - creatinine continues to climb, has been refusing treatments, noted plans for dc today  - further decrease torsemide to 40 mg every day and hold lisinopril - meds reconciled  for the dc  - maintain liu and fup with urology after the dc  - intermittent and now resolved hematuria, oncology input   - fup with us in 2 weeks with labs, will " arrange      PANCHITO reviewed, dose for GFR<30    Nichole Fowler MD PhD

## 2023-12-13 ENCOUNTER — HOSPITAL ENCOUNTER (INPATIENT)
Facility: HOSPITAL | Age: 83
LOS: 3 days | Discharge: HOME | DRG: 683 | End: 2023-12-16
Attending: INTERNAL MEDICINE | Admitting: INTERNAL MEDICINE
Payer: MEDICARE

## 2023-12-13 DIAGNOSIS — E86.0 DEHYDRATION: ICD-10-CM

## 2023-12-13 DIAGNOSIS — N39.0 URINARY TRACT INFECTION ASSOCIATED WITH INDWELLING URETHRAL CATHETER, INITIAL ENCOUNTER (CMS-HCC): ICD-10-CM

## 2023-12-13 DIAGNOSIS — R26.81 GAIT INSTABILITY: ICD-10-CM

## 2023-12-13 DIAGNOSIS — I50.22 CHRONIC SYSTOLIC HEART FAILURE (MULTI): ICD-10-CM

## 2023-12-13 DIAGNOSIS — R33.9 URINARY RETENTION: ICD-10-CM

## 2023-12-13 DIAGNOSIS — N39.0 LOWER URINARY TRACT INFECTION: ICD-10-CM

## 2023-12-13 DIAGNOSIS — N17.9 AKI (ACUTE KIDNEY INJURY) (CMS-HCC): Primary | ICD-10-CM

## 2023-12-13 DIAGNOSIS — N18.30 STAGE 3 CHRONIC KIDNEY DISEASE, UNSPECIFIED WHETHER STAGE 3A OR 3B CKD (MULTI): ICD-10-CM

## 2023-12-13 DIAGNOSIS — T83.511A URINARY TRACT INFECTION ASSOCIATED WITH INDWELLING URETHRAL CATHETER, INITIAL ENCOUNTER (CMS-HCC): ICD-10-CM

## 2023-12-13 LAB
ALBUMIN SERPL BCP-MCNC: 3.5 G/DL (ref 3.4–5)
ALP SERPL-CCNC: 66 U/L (ref 33–136)
ALT SERPL W P-5'-P-CCNC: 8 U/L (ref 10–52)
AMORPH CRY #/AREA UR COMP ASSIST: ABNORMAL /HPF
ANION GAP SERPL CALC-SCNC: 15 MMOL/L (ref 10–20)
APPEARANCE UR: ABNORMAL
AST SERPL W P-5'-P-CCNC: 31 U/L (ref 9–39)
BACTERIA #/AREA URNS AUTO: ABNORMAL /HPF
BASOPHILS # BLD AUTO: 0.05 X10*3/UL (ref 0–0.1)
BASOPHILS NFR BLD AUTO: 0.4 %
BILIRUB SERPL-MCNC: 0.9 MG/DL (ref 0–1.2)
BILIRUB UR STRIP.AUTO-MCNC: NEGATIVE MG/DL
BUN SERPL-MCNC: 43 MG/DL (ref 6–23)
CALCIUM SERPL-MCNC: 8.4 MG/DL (ref 8.6–10.3)
CHLORIDE SERPL-SCNC: 92 MMOL/L (ref 98–107)
CK SERPL-CCNC: 103 U/L (ref 0–325)
CO2 SERPL-SCNC: 26 MMOL/L (ref 21–32)
COLOR UR: YELLOW
CREAT SERPL-MCNC: 2.89 MG/DL (ref 0.5–1.3)
EOSINOPHIL # BLD AUTO: 0.29 X10*3/UL (ref 0–0.4)
EOSINOPHIL NFR BLD AUTO: 2.4 %
ERYTHROCYTE [DISTWIDTH] IN BLOOD BY AUTOMATED COUNT: 12.9 % (ref 11.5–14.5)
GFR SERPL CREATININE-BSD FRML MDRD: 21 ML/MIN/1.73M*2
GLUCOSE SERPL-MCNC: 166 MG/DL (ref 74–99)
GLUCOSE UR STRIP.AUTO-MCNC: NEGATIVE MG/DL
HCT VFR BLD AUTO: 37.5 % (ref 41–52)
HGB BLD-MCNC: 12.5 G/DL (ref 13.5–17.5)
HOLD SPECIMEN: NORMAL
HYALINE CASTS #/AREA URNS AUTO: ABNORMAL /LPF
IMM GRANULOCYTES # BLD AUTO: 0.07 X10*3/UL (ref 0–0.5)
IMM GRANULOCYTES NFR BLD AUTO: 0.6 % (ref 0–0.9)
KETONES UR STRIP.AUTO-MCNC: NEGATIVE MG/DL
LEUKOCYTE ESTERASE UR QL STRIP.AUTO: ABNORMAL
LYMPHOCYTES # BLD AUTO: 0.82 X10*3/UL (ref 0.8–3)
LYMPHOCYTES NFR BLD AUTO: 6.9 %
MAGNESIUM SERPL-MCNC: 2.18 MG/DL (ref 1.6–2.4)
MCH RBC QN AUTO: 31 PG (ref 26–34)
MCHC RBC AUTO-ENTMCNC: 33.3 G/DL (ref 32–36)
MCV RBC AUTO: 93 FL (ref 80–100)
MONOCYTES # BLD AUTO: 1.11 X10*3/UL (ref 0.05–0.8)
MONOCYTES NFR BLD AUTO: 9.3 %
MUCOUS THREADS #/AREA URNS AUTO: ABNORMAL /LPF
NEUTROPHILS # BLD AUTO: 9.57 X10*3/UL (ref 1.6–5.5)
NEUTROPHILS NFR BLD AUTO: 80.4 %
NITRITE UR QL STRIP.AUTO: NEGATIVE
NRBC BLD-RTO: 0 /100 WBCS (ref 0–0)
PH UR STRIP.AUTO: 5 [PH]
PLATELET # BLD AUTO: 216 X10*3/UL (ref 150–450)
POTASSIUM SERPL-SCNC: 4.4 MMOL/L (ref 3.5–5.3)
POTASSIUM SERPL-SCNC: 6.9 MMOL/L (ref 3.5–5.3)
PROT SERPL-MCNC: 6.7 G/DL (ref 6.4–8.2)
PROT UR STRIP.AUTO-MCNC: ABNORMAL MG/DL
RBC # BLD AUTO: 4.03 X10*6/UL (ref 4.5–5.9)
RBC # UR STRIP.AUTO: ABNORMAL /UL
RBC #/AREA URNS AUTO: >20 /HPF
SODIUM SERPL-SCNC: 126 MMOL/L (ref 136–145)
SP GR UR STRIP.AUTO: 1.01
SQUAMOUS #/AREA URNS AUTO: ABNORMAL /HPF
UROBILINOGEN UR STRIP.AUTO-MCNC: <2 MG/DL
WBC # BLD AUTO: 11.9 X10*3/UL (ref 4.4–11.3)
WBC #/AREA URNS AUTO: >50 /HPF
WBC CLUMPS #/AREA URNS AUTO: ABNORMAL /HPF

## 2023-12-13 PROCEDURE — 36415 COLL VENOUS BLD VENIPUNCTURE: CPT | Performed by: INTERNAL MEDICINE

## 2023-12-13 PROCEDURE — 1210000001 HC SEMI-PRIVATE ROOM DAILY

## 2023-12-13 PROCEDURE — 96365 THER/PROPH/DIAG IV INF INIT: CPT

## 2023-12-13 PROCEDURE — 81001 URINALYSIS AUTO W/SCOPE: CPT | Performed by: HEALTH CARE PROVIDER

## 2023-12-13 PROCEDURE — 85025 COMPLETE CBC W/AUTO DIFF WBC: CPT | Performed by: INTERNAL MEDICINE

## 2023-12-13 PROCEDURE — 80053 COMPREHEN METABOLIC PANEL: CPT | Performed by: INTERNAL MEDICINE

## 2023-12-13 PROCEDURE — 99223 1ST HOSP IP/OBS HIGH 75: CPT | Performed by: INTERNAL MEDICINE

## 2023-12-13 PROCEDURE — 99285 EMERGENCY DEPT VISIT HI MDM: CPT | Mod: 25 | Performed by: INTERNAL MEDICINE

## 2023-12-13 PROCEDURE — 83735 ASSAY OF MAGNESIUM: CPT | Performed by: INTERNAL MEDICINE

## 2023-12-13 PROCEDURE — 82550 ASSAY OF CK (CPK): CPT | Performed by: INTERNAL MEDICINE

## 2023-12-13 PROCEDURE — 84132 ASSAY OF SERUM POTASSIUM: CPT | Performed by: INTERNAL MEDICINE

## 2023-12-13 PROCEDURE — 2500000004 HC RX 250 GENERAL PHARMACY W/ HCPCS (ALT 636 FOR OP/ED): Performed by: INTERNAL MEDICINE

## 2023-12-13 PROCEDURE — 87086 URINE CULTURE/COLONY COUNT: CPT | Mod: PARLAB | Performed by: HEALTH CARE PROVIDER

## 2023-12-13 RX ORDER — TAMSULOSIN HYDROCHLORIDE 0.4 MG/1
0.4 CAPSULE ORAL ONCE
Status: COMPLETED | OUTPATIENT
Start: 2023-12-13 | End: 2023-12-13

## 2023-12-13 RX ORDER — CEFTRIAXONE 1 G/50ML
1 INJECTION, SOLUTION INTRAVENOUS ONCE
Status: COMPLETED | OUTPATIENT
Start: 2023-12-13 | End: 2023-12-13

## 2023-12-13 RX ADMIN — CEFTRIAXONE SODIUM 1 G: 1 INJECTION, SOLUTION INTRAVENOUS at 20:56

## 2023-12-13 RX ADMIN — TAMSULOSIN HYDROCHLORIDE 0.4 MG: 0.4 CAPSULE ORAL at 18:01

## 2023-12-13 RX ADMIN — SODIUM CHLORIDE 1000 ML: 9 INJECTION, SOLUTION INTRAVENOUS at 19:31

## 2023-12-13 ASSESSMENT — PAIN - FUNCTIONAL ASSESSMENT: PAIN_FUNCTIONAL_ASSESSMENT: 0-10

## 2023-12-13 ASSESSMENT — COLUMBIA-SUICIDE SEVERITY RATING SCALE - C-SSRS
6. HAVE YOU EVER DONE ANYTHING, STARTED TO DO ANYTHING, OR PREPARED TO DO ANYTHING TO END YOUR LIFE?: NO
1. IN THE PAST MONTH, HAVE YOU WISHED YOU WERE DEAD OR WISHED YOU COULD GO TO SLEEP AND NOT WAKE UP?: NO
2. HAVE YOU ACTUALLY HAD ANY THOUGHTS OF KILLING YOURSELF?: NO

## 2023-12-13 ASSESSMENT — LIFESTYLE VARIABLES
HAVE YOU EVER FELT YOU SHOULD CUT DOWN ON YOUR DRINKING: NO
REASON UNABLE TO ASSESS: NO
EVER FELT BAD OR GUILTY ABOUT YOUR DRINKING: NO
HAVE PEOPLE ANNOYED YOU BY CRITICIZING YOUR DRINKING: NO
EVER HAD A DRINK FIRST THING IN THE MORNING TO STEADY YOUR NERVES TO GET RID OF A HANGOVER: NO

## 2023-12-13 ASSESSMENT — PAIN SCALES - GENERAL: PAINLEVEL_OUTOF10: 3

## 2023-12-13 NOTE — ED TRIAGE NOTES
83 y.o. male with past medical history of CAD s/p CABG,  HFrEF, recurrent urinary retention w/chronic Deal, CKD, and prostate cancer who presented lower back pain, sciatic pain, patient is also complaining Deal catheter malfunction has not been draining for the last 12 hours.  Patient denies having any fever, chills he denies any nausea or vomiting he does report some tenderness in the suprapubic region.  Patient was discharged 24 hours ago urine culture was negative he did receive Rocephin while inpatient.  Patient states mostly concerned about Deal catheter malfunction.    Normal physical exam:    General: Vitals noted, no distress. Afebrile. Alert and oriented  x 3.     EENT: TMs clear. Posterior oropharynx unremarkable. No meningismus. No LAD.     Cardiac: Regular, rate, rhythm, no murmurs rubs or gallops.     Pulmonary: Lungs clear bilaterally with good aeration. No adventitious breath sounds. No wheezes rales or rhonchi.     Abdomen: Soft, nonsurgical. Nontender. No peritoneal signs. Normoactive bowel sounds. No pulsatile masses.     Extremities: No peripheral edema. Negative Homans bilaterally, no cords.    Skin: No rash. Intact.     Neuro: No focal neurologic deficits, NIH score of 0. Cranial nerves normal as tested from II through XII.

## 2023-12-13 NOTE — ED PROVIDER NOTES
HPI   Chief Complaint   Patient presents with    Urinary Retention     Pt arrives private auto from home. Was @ [facility] last 3 days and states he went home with liu/leg bag and states no output in bag today pt stated there had been blood/clots coming out and now nothing. Denies abdominal pain.        Patient presenting for evaluation of difficulty urinating.  Patient states he has not had output out of his Liu catheter for almost 13 hours.  Patient patient states has had issues with blood clots and infection in his urine causing clots in his catheters in the past.  Patient states he has issues with his prostate.  Patient notes he was started on Flomax yesterday.  Patient denies vomiting.  Denies abdominal pain.                          Petra Coma Scale Score: 15                  Patient History   Past Medical History:   Diagnosis Date    CAD (coronary artery disease)     CHF (congestive heart failure) (CMS/HCC)     EF 40-45%    Chronic indwelling Liu catheter     Chronic renal disease, stage III (CMS/HCC)     Diabetes mellitus (CMS/HCC)     Eczema     Hiatal hernia     Hypertension     Infarction of spleen     Intracardiac thrombosis, not elsewhere classified     Apical mural thrombus    Old myocardial infarction     Prostate cancer (CMS/HCC)     Pulmonary nodules     Renal cyst     bilateral    TIA (transient ischemic attack)      Past Surgical History:   Procedure Laterality Date    CARDIAC CATHETERIZATION  2013    2 stents    CORONARY ARTERY BYPASS GRAFT      PILONIDAL CYST DRAINAGE  07/26/2018    Pilonidal Cyst Resection    PROSTATE SURGERY       No family history on file.  Social History     Tobacco Use    Smoking status: Former     Types: Cigarettes     Quit date: 2008     Years since quitting: 15.9    Smokeless tobacco: Never   Substance Use Topics    Alcohol use: Never    Drug use: Never       Physical Exam   ED Triage Vitals [12/13/23 1719]   Temp Heart Rate Resp BP   36.8 °C (98.2 °F) 67 20  125/61      SpO2 Temp src Heart Rate Source Patient Position   98 % -- -- --      BP Location FiO2 (%)     -- --       Physical Exam  Vitals and nursing note reviewed.   Constitutional:       Appearance: Normal appearance.   HENT:      Head: Atraumatic.      Right Ear: External ear normal.      Left Ear: External ear normal.      Nose: Nose normal.      Mouth/Throat:      Mouth: Mucous membranes are moist.      Pharynx: Oropharynx is clear.   Eyes:      Extraocular Movements: Extraocular movements intact.      Pupils: Pupils are equal, round, and reactive to light.   Cardiovascular:      Rate and Rhythm: Normal rate and regular rhythm.      Pulses: Normal pulses.   Pulmonary:      Effort: Pulmonary effort is normal.      Breath sounds: Normal breath sounds.   Abdominal:      Palpations: Abdomen is soft.      Tenderness: There is no abdominal tenderness.   Musculoskeletal:         General: No tenderness or signs of injury. Normal range of motion.      Cervical back: Normal range of motion and neck supple. No rigidity or tenderness.      Right lower leg: Edema present.      Left lower leg: Edema present.   Skin:     General: Skin is warm and dry.      Comments: Chronic discoloration to bilateral lower extremities   Neurological:      General: No focal deficit present.      Mental Status: He is alert and oriented to person, place, and time. Mental status is at baseline.   Psychiatric:         Mood and Affect: Mood normal.         Behavior: Behavior normal.         ED Course & MDM   ED Course as of 12/14/23 1223   Wed Dec 13, 2023   1954 Updated patient with findings thus far.  Patient agrees with plan of admission should be necessary.  Discussed with the patient and his family and they note the only 2 medications that he started since yesterday were Januvia and oxycodone.  No other new medications.  Patient states has been eating and drinking normally since evaluation yesterday. [JA]   2041 Discussed with the  Hospitalist and they accept the patient to their service.  [JA]      ED Course User Index  [JA] Wilman Cox DO         Diagnoses as of 12/14/23 1223   LAURA (acute kidney injury) (CMS/[facility])   Dehydration   Urinary tract infection associated with indwelling urethral catheter, initial encounter (CMS/[facility])       Medical Decision Making  Differential diagnosis: Acute kidney injury, dehydration, ATN, UTI, other    Patient presenting for difficulty urinating.  Patient states he has a catheter in place but has had decreased urine output over the last 12 hours.  Patient feels there may be complications with the Deal catheter.  Bladder scan shows minimal amount of urine in the bladder.  Deal catheter is draining in the emergency room.  Urinalysis does have concern for UTI.  IV antibiotics are in the ED.  Patient found to be significantly dehydrated.  Increasing creatinine.  Concern for acute kidney injury.  IV fluid started in ED.  Patient admitted for further evaluation.        Procedure  Procedures     Wilman Cox DO  12/14/23 1223

## 2023-12-14 ENCOUNTER — DOCUMENTATION (OUTPATIENT)
Dept: CASE MANAGEMENT | Facility: HOSPITAL | Age: 83
End: 2023-12-14
Payer: MEDICARE

## 2023-12-14 ENCOUNTER — APPOINTMENT (OUTPATIENT)
Dept: RADIOLOGY | Facility: HOSPITAL | Age: 83
DRG: 683 | End: 2023-12-14
Payer: MEDICARE

## 2023-12-14 ENCOUNTER — DOCUMENTATION (OUTPATIENT)
Dept: CARE COORDINATION | Facility: CLINIC | Age: 83
End: 2023-12-14
Payer: MEDICARE

## 2023-12-14 LAB
ANION GAP SERPL CALC-SCNC: 13 MMOL/L (ref 10–20)
BUN SERPL-MCNC: 43 MG/DL (ref 6–23)
CALCIUM SERPL-MCNC: 8.4 MG/DL (ref 8.6–10.3)
CHLORIDE SERPL-SCNC: 96 MMOL/L (ref 98–107)
CO2 SERPL-SCNC: 26 MMOL/L (ref 21–32)
CREAT SERPL-MCNC: 2.2 MG/DL (ref 0.5–1.3)
CREAT UR-MCNC: 78.5 MG/DL (ref 20–370)
GFR SERPL CREATININE-BSD FRML MDRD: 29 ML/MIN/1.73M*2
GLUCOSE BLD MANUAL STRIP-MCNC: 154 MG/DL (ref 74–99)
GLUCOSE BLD MANUAL STRIP-MCNC: 213 MG/DL (ref 74–99)
GLUCOSE SERPL-MCNC: 189 MG/DL (ref 74–99)
OSMOLALITY SERPL: 291 MOSM/KG (ref 280–300)
OSMOLALITY UR: 253 MOSM/KG (ref 200–1200)
POTASSIUM SERPL-SCNC: 4.1 MMOL/L (ref 3.5–5.3)
SODIUM SERPL-SCNC: 131 MMOL/L (ref 136–145)
SODIUM UR-SCNC: 18 MMOL/L
SODIUM/CREAT UR-RTO: 23 MMOL/G CREAT

## 2023-12-14 PROCEDURE — 96372 THER/PROPH/DIAG INJ SC/IM: CPT | Performed by: INTERNAL MEDICINE

## 2023-12-14 PROCEDURE — 94760 N-INVAS EAR/PLS OXIMETRY 1: CPT

## 2023-12-14 PROCEDURE — 2500000004 HC RX 250 GENERAL PHARMACY W/ HCPCS (ALT 636 FOR OP/ED): Performed by: INTERNAL MEDICINE

## 2023-12-14 PROCEDURE — 82947 ASSAY GLUCOSE BLOOD QUANT: CPT

## 2023-12-14 PROCEDURE — 76770 US EXAM ABDO BACK WALL COMP: CPT

## 2023-12-14 PROCEDURE — 2500000002 HC RX 250 W HCPCS SELF ADMINISTERED DRUGS (ALT 637 FOR MEDICARE OP, ALT 636 FOR OP/ED): Performed by: INTERNAL MEDICINE

## 2023-12-14 PROCEDURE — 82570 ASSAY OF URINE CREATININE: CPT | Performed by: NURSE PRACTITIONER

## 2023-12-14 PROCEDURE — 76770 US EXAM ABDO BACK WALL COMP: CPT | Performed by: RADIOLOGY

## 2023-12-14 PROCEDURE — 36415 COLL VENOUS BLD VENIPUNCTURE: CPT | Performed by: NURSE PRACTITIONER

## 2023-12-14 PROCEDURE — 2500000001 HC RX 250 WO HCPCS SELF ADMINISTERED DRUGS (ALT 637 FOR MEDICARE OP): Performed by: INTERNAL MEDICINE

## 2023-12-14 PROCEDURE — 82374 ASSAY BLOOD CARBON DIOXIDE: CPT | Performed by: NURSE PRACTITIONER

## 2023-12-14 PROCEDURE — 1210000001 HC SEMI-PRIVATE ROOM DAILY

## 2023-12-14 PROCEDURE — 83930 ASSAY OF BLOOD OSMOLALITY: CPT | Mod: PARLAB | Performed by: NURSE PRACTITIONER

## 2023-12-14 PROCEDURE — 99232 SBSQ HOSP IP/OBS MODERATE 35: CPT | Performed by: NURSE PRACTITIONER

## 2023-12-14 PROCEDURE — 83935 ASSAY OF URINE OSMOLALITY: CPT | Mod: PARLAB | Performed by: NURSE PRACTITIONER

## 2023-12-14 RX ORDER — INSULIN LISPRO 100 [IU]/ML
0-10 INJECTION, SOLUTION INTRAVENOUS; SUBCUTANEOUS
Status: DISCONTINUED | OUTPATIENT
Start: 2023-12-14 | End: 2023-12-16 | Stop reason: HOSPADM

## 2023-12-14 RX ORDER — OXYCODONE AND ACETAMINOPHEN 5; 325 MG/1; MG/1
1 TABLET ORAL EVERY 6 HOURS PRN
Status: DISCONTINUED | OUTPATIENT
Start: 2023-12-14 | End: 2023-12-16 | Stop reason: HOSPADM

## 2023-12-14 RX ORDER — METOPROLOL TARTRATE 25 MG/1
25 TABLET, FILM COATED ORAL 2 TIMES DAILY
Status: DISCONTINUED | OUTPATIENT
Start: 2023-12-14 | End: 2023-12-16 | Stop reason: HOSPADM

## 2023-12-14 RX ORDER — NAPROXEN SODIUM 220 MG/1
81 TABLET, FILM COATED ORAL DAILY
Status: DISCONTINUED | OUTPATIENT
Start: 2023-12-14 | End: 2023-12-16 | Stop reason: HOSPADM

## 2023-12-14 RX ORDER — TAMSULOSIN HYDROCHLORIDE 0.4 MG/1
0.4 CAPSULE ORAL DAILY
Status: DISCONTINUED | OUTPATIENT
Start: 2023-12-14 | End: 2023-12-16 | Stop reason: HOSPADM

## 2023-12-14 RX ORDER — SODIUM CHLORIDE 9 MG/ML
75 INJECTION, SOLUTION INTRAVENOUS CONTINUOUS
Status: ACTIVE | OUTPATIENT
Start: 2023-12-14 | End: 2023-12-15

## 2023-12-14 RX ORDER — GABAPENTIN 100 MG/1
100 CAPSULE ORAL 3 TIMES DAILY
Status: DISCONTINUED | OUTPATIENT
Start: 2023-12-14 | End: 2023-12-16 | Stop reason: HOSPADM

## 2023-12-14 RX ORDER — ACETAMINOPHEN 325 MG/1
650 TABLET ORAL EVERY 6 HOURS PRN
Status: DISCONTINUED | OUTPATIENT
Start: 2023-12-14 | End: 2023-12-16 | Stop reason: HOSPADM

## 2023-12-14 RX ORDER — OXYCODONE AND ACETAMINOPHEN 5; 325 MG/1; MG/1
2 TABLET ORAL EVERY 6 HOURS PRN
Status: DISCONTINUED | OUTPATIENT
Start: 2023-12-14 | End: 2023-12-16 | Stop reason: HOSPADM

## 2023-12-14 RX ORDER — HEPARIN SODIUM 5000 [USP'U]/ML
5000 INJECTION, SOLUTION INTRAVENOUS; SUBCUTANEOUS EVERY 8 HOURS
Status: DISCONTINUED | OUTPATIENT
Start: 2023-12-14 | End: 2023-12-16 | Stop reason: HOSPADM

## 2023-12-14 RX ORDER — DEXTROSE 50 % IN WATER (D50W) INTRAVENOUS SYRINGE
25
Status: DISCONTINUED | OUTPATIENT
Start: 2023-12-14 | End: 2023-12-16 | Stop reason: HOSPADM

## 2023-12-14 RX ORDER — ISOSORBIDE MONONITRATE 30 MG/1
30 TABLET, EXTENDED RELEASE ORAL DAILY
Status: DISCONTINUED | OUTPATIENT
Start: 2023-12-14 | End: 2023-12-16 | Stop reason: HOSPADM

## 2023-12-14 RX ORDER — DEXTROSE MONOHYDRATE 100 MG/ML
0.3 INJECTION, SOLUTION INTRAVENOUS ONCE AS NEEDED
Status: DISCONTINUED | OUTPATIENT
Start: 2023-12-14 | End: 2023-12-16 | Stop reason: HOSPADM

## 2023-12-14 RX ORDER — ATORVASTATIN CALCIUM 80 MG/1
80 TABLET, FILM COATED ORAL DAILY
Status: DISCONTINUED | OUTPATIENT
Start: 2023-12-14 | End: 2023-12-16 | Stop reason: HOSPADM

## 2023-12-14 RX ADMIN — INSULIN LISPRO 4 UNITS: 100 INJECTION, SOLUTION INTRAVENOUS; SUBCUTANEOUS at 14:40

## 2023-12-14 RX ADMIN — ASPIRIN 81 MG CHEWABLE TABLET 81 MG: 81 TABLET CHEWABLE at 13:49

## 2023-12-14 RX ADMIN — ISOSORBIDE MONONITRATE 30 MG: 30 TABLET, EXTENDED RELEASE ORAL at 13:49

## 2023-12-14 RX ADMIN — SODIUM CHLORIDE 75 ML/HR: 9 INJECTION, SOLUTION INTRAVENOUS at 20:45

## 2023-12-14 RX ADMIN — ATORVASTATIN CALCIUM 80 MG: 80 TABLET, FILM COATED ORAL at 13:49

## 2023-12-14 RX ADMIN — HEPARIN SODIUM 5000 UNITS: 5000 INJECTION INTRAVENOUS; SUBCUTANEOUS at 20:42

## 2023-12-14 RX ADMIN — SODIUM CHLORIDE 75 ML/HR: 9 INJECTION, SOLUTION INTRAVENOUS at 13:54

## 2023-12-14 RX ADMIN — TAMSULOSIN HYDROCHLORIDE 0.4 MG: 0.4 CAPSULE ORAL at 13:51

## 2023-12-14 RX ADMIN — HEPARIN SODIUM 5000 UNITS: 5000 INJECTION INTRAVENOUS; SUBCUTANEOUS at 13:49

## 2023-12-14 RX ADMIN — METOPROLOL TARTRATE 25 MG: 25 TABLET, FILM COATED ORAL at 13:49

## 2023-12-14 RX ADMIN — METOPROLOL TARTRATE 25 MG: 25 TABLET, FILM COATED ORAL at 20:41

## 2023-12-14 RX ADMIN — GABAPENTIN 100 MG: 100 CAPSULE ORAL at 14:40

## 2023-12-14 RX ADMIN — SODIUM CHLORIDE 75 ML/HR: 9 INJECTION, SOLUTION INTRAVENOUS at 15:15

## 2023-12-14 RX ADMIN — GABAPENTIN 100 MG: 100 CAPSULE ORAL at 20:41

## 2023-12-14 SDOH — SOCIAL STABILITY: SOCIAL INSECURITY: ARE YOU OR HAVE YOU BEEN THREATENED OR ABUSED PHYSICALLY, EMOTIONALLY, OR SEXUALLY BY ANYONE?: NO

## 2023-12-14 SDOH — HEALTH STABILITY: MENTAL HEALTH
STRESS IS WHEN SOMEONE FEELS TENSE, NERVOUS, ANXIOUS, OR CAN'T SLEEP AT NIGHT BECAUSE THEIR MIND IS TROUBLED. HOW STRESSED ARE YOU?: NOT AT ALL

## 2023-12-14 SDOH — ECONOMIC STABILITY: HOUSING INSECURITY: IN THE LAST 12 MONTHS, HOW MANY PLACES HAVE YOU LIVED?: 1

## 2023-12-14 SDOH — ECONOMIC STABILITY: FOOD INSECURITY
ARE ANY OF YOUR NEEDS URGENT? FOR EXAMPLE, UNCERTAINTY OF WHERE YOU WILL GET YOUR NEXT MEAL OR NOT HAVING THE MEDICATIONS YOU NEED TO TAKE TOMORROW.: NO

## 2023-12-14 SDOH — ECONOMIC STABILITY: INCOME INSECURITY: IN THE PAST 12 MONTHS, HAS THE ELECTRIC, GAS, OIL, OR WATER COMPANY THREATENED TO SHUT OFF SERVICE IN YOUR HOME?: NO

## 2023-12-14 SDOH — SOCIAL STABILITY: SOCIAL INSECURITY: WITHIN THE LAST YEAR, HAVE YOU BEEN AFRAID OF YOUR PARTNER OR EX-PARTNER?: NO

## 2023-12-14 SDOH — ECONOMIC STABILITY: FOOD INSECURITY: WITHIN THE PAST 12 MONTHS, THE FOOD YOU BOUGHT JUST DIDN'T LAST AND YOU DIDN'T HAVE MONEY TO GET MORE.: NEVER TRUE

## 2023-12-14 SDOH — HEALTH STABILITY: PHYSICAL HEALTH: ON AVERAGE, HOW MANY MINUTES DO YOU ENGAGE IN EXERCISE AT THIS LEVEL?: 0 MIN

## 2023-12-14 SDOH — ECONOMIC STABILITY: INCOME INSECURITY: IN THE LAST 12 MONTHS, WAS THERE A TIME WHEN YOU WERE NOT ABLE TO PAY THE MORTGAGE OR RENT ON TIME?: NO

## 2023-12-14 SDOH — SOCIAL STABILITY: SOCIAL INSECURITY: HAS ANYONE EVER THREATENED TO HURT YOUR FAMILY OR YOUR PETS?: NO

## 2023-12-14 SDOH — SOCIAL STABILITY: SOCIAL INSECURITY: ABUSE: ADULT

## 2023-12-14 SDOH — SOCIAL STABILITY: SOCIAL INSECURITY: DO YOU FEEL ANYONE HAS EXPLOITED OR TAKEN ADVANTAGE OF YOU FINANCIALLY OR OF YOUR PERSONAL PROPERTY?: NO

## 2023-12-14 SDOH — SOCIAL STABILITY: SOCIAL INSECURITY: WERE YOU ABLE TO COMPLETE ALL THE BEHAVIORAL HEALTH SCREENINGS?: YES

## 2023-12-14 SDOH — ECONOMIC STABILITY: FOOD INSECURITY: WITHIN THE PAST 12 MONTHS, YOU WORRIED THAT YOUR FOOD WOULD RUN OUT BEFORE YOU GOT MONEY TO BUY MORE.: NEVER TRUE

## 2023-12-14 SDOH — SOCIAL STABILITY: SOCIAL INSECURITY: HAVE YOU HAD THOUGHTS OF HARMING ANYONE ELSE?: NO

## 2023-12-14 SDOH — ECONOMIC STABILITY: GENERAL

## 2023-12-14 SDOH — SOCIAL STABILITY: SOCIAL INSECURITY: DOES ANYONE TRY TO KEEP YOU FROM HAVING/CONTACTING OTHER FRIENDS OR DOING THINGS OUTSIDE YOUR HOME?: NO

## 2023-12-14 SDOH — SOCIAL STABILITY: SOCIAL INSECURITY: DO YOU FEEL UNSAFE GOING BACK TO THE PLACE WHERE YOU ARE LIVING?: NO

## 2023-12-14 SDOH — ECONOMIC STABILITY: INCOME INSECURITY: HOW HARD IS IT FOR YOU TO PAY FOR THE VERY BASICS LIKE FOOD, HOUSING, MEDICAL CARE, AND HEATING?: NOT HARD AT ALL

## 2023-12-14 SDOH — SOCIAL STABILITY: SOCIAL INSECURITY: ARE THERE ANY APPARENT SIGNS OF INJURIES/BEHAVIORS THAT COULD BE RELATED TO ABUSE/NEGLECT?: NO

## 2023-12-14 ASSESSMENT — LIFESTYLE VARIABLES
SKIP TO QUESTIONS 9-10: 1
HOW MANY STANDARD DRINKS CONTAINING ALCOHOL DO YOU HAVE ON A TYPICAL DAY: PATIENT DOES NOT DRINK
HOW OFTEN DO YOU HAVE 6 OR MORE DRINKS ON ONE OCCASION: NEVER
AUDIT-C TOTAL SCORE: 0
AUDIT-C TOTAL SCORE: 0
HOW OFTEN DO YOU HAVE A DRINK CONTAINING ALCOHOL: NEVER

## 2023-12-14 ASSESSMENT — COGNITIVE AND FUNCTIONAL STATUS - GENERAL
STANDING UP FROM CHAIR USING ARMS: A LITTLE
DRESSING REGULAR UPPER BODY CLOTHING: A LITTLE
MOBILITY SCORE: 20
PERSONAL GROOMING: A LITTLE
STANDING UP FROM CHAIR USING ARMS: A LITTLE
WALKING IN HOSPITAL ROOM: A LITTLE
DRESSING REGULAR LOWER BODY CLOTHING: A LITTLE
HELP NEEDED FOR BATHING: A LITTLE
WALKING IN HOSPITAL ROOM: A LITTLE
DAILY ACTIVITIY SCORE: 20
HELP NEEDED FOR BATHING: A LITTLE
DRESSING REGULAR UPPER BODY CLOTHING: A LITTLE
CLIMB 3 TO 5 STEPS WITH RAILING: A LITTLE
MOVING TO AND FROM BED TO CHAIR: A LITTLE
MOBILITY SCORE: 20
PATIENT BASELINE BEDBOUND: NO
DRESSING REGULAR LOWER BODY CLOTHING: A LITTLE
CLIMB 3 TO 5 STEPS WITH RAILING: A LITTLE
MOVING TO AND FROM BED TO CHAIR: A LITTLE
DAILY ACTIVITIY SCORE: 21

## 2023-12-14 ASSESSMENT — ACTIVITIES OF DAILY LIVING (ADL)
BATHING: NEEDS ASSISTANCE
WALKS IN HOME: NEEDS ASSISTANCE
PATIENT'S MEMORY ADEQUATE TO SAFELY COMPLETE DAILY ACTIVITIES?: YES
ADEQUATE_TO_COMPLETE_ADL: YES
DRESSING YOURSELF: NEEDS ASSISTANCE
LACK_OF_TRANSPORTATION: NO
ASSISTIVE_DEVICE: EYEGLASSES;WALKER
HEARING - LEFT EAR: FUNCTIONAL
HEARING - RIGHT EAR: FUNCTIONAL
TOILETING: INDEPENDENT
JUDGMENT_ADEQUATE_SAFELY_COMPLETE_DAILY_ACTIVITIES: YES
FEEDING YOURSELF: INDEPENDENT
GROOMING: NEEDS ASSISTANCE

## 2023-12-14 ASSESSMENT — PAIN SCALES - GENERAL
PAINLEVEL_OUTOF10: 0 - NO PAIN

## 2023-12-14 ASSESSMENT — PATIENT HEALTH QUESTIONNAIRE - PHQ9
2. FEELING DOWN, DEPRESSED OR HOPELESS: NOT AT ALL
1. LITTLE INTEREST OR PLEASURE IN DOING THINGS: NOT AT ALL
SUM OF ALL RESPONSES TO PHQ9 QUESTIONS 1 & 2: 0

## 2023-12-14 NOTE — PROGRESS NOTES
Pharmacy Medication History Review    Dennis Galo is a 83 y.o. male admitted for LAURA (acute kidney injury) (CMS/[facility]). Pharmacy reviewed the patient's [id number] medications and allergies for accuracy.    The list below reflectives the updated PTA list. Please review each medication in order reconciliation for additional clarification and justification.  (Not in a hospital admission)       The list below reflectives the updated allergy list. Please review each documented allergy for additional clarification and justification.  Allergies  Reviewed by Michelle Zamora CPhT on 12/14/2023        Severity Reactions Comments    Terazosin Low Palpitations, Dizziness Dizziness and heart pounding            Below are additional concerns with the patient's PTA list.    See PTA med list    Michelle Zamora CPhT

## 2023-12-14 NOTE — PROGRESS NOTES
FABI spoke with [facility]PS  Joseline regarding referral made to [facility]PS. SW provided information requested. FABI coordinated with sAhish ([facility]) during pts last admission to coordinate community follow up. FABI spoke with Ashish regarding pts readmission and Ashish and FABI will follow with pt for safe dc planning and community follow up.    ARTEMIO LEWIS

## 2023-12-14 NOTE — H&P
"Hospital Medicine History & Physical    Patient Name: Dennis Galo   YOB: 1940    Subjective:    Dennsi Galo is a 83 y.o. male who presents to the hospital with complaints of decreased urine output.  Patient was just discharged from hospital yesterday after being treated for hematuria and urinary tract infection.  Hospital course was complicated by worsening renal function in setting of CKD.  Patient was seen by nephrology and his diuretics were adjusted and his ACE inhibitor was discontinued.  Yesterday patient noticed he was having no urine output in his Deal catheter.  He returned to the emergency department.  Catheter was flushed and he did begin to drain a small amount of muddy brown urine.  Renal function showed a significant worsening of his creatinine.  Therefore patient was admitted to our service.    A 10 point ROS was completed and is negative expect as stated in HPI.     Past Medical History:  CKD III, baseline ~1.5  Type II DM   HTN  HFrEF  HLD  Obesity   CAD  Prostate CA    Past Surgical History:  CABG  Prostate CA    Social History: Tobacco - Never, Alcohol - none, Recreational Drugs - none    Family History: family history is not on file.    Objective:    /61   Pulse 67   Temp 36.8 °C (98.2 °F)   Resp 20   Ht 1.778 m (5' 10\")   Wt 104 kg (230 lb)   SpO2 98%   BMI 33.00 kg/m²     Physical Exam:    GENERAL: Well developed and in no apparent distress. Alert and cooperative.  HEENT: Normocephalic and atraumatic.  Clear sclera.  CARDIOVASCULAR: Regular rate and rhythm.  No murmurs, rubs, or gallops. S1/S2.  RESPIRATORY: Clear to auscultation b/l. No wheezes, rales or rhonchi. Normal effort.   ABDOMEN: Soft, non-tender. Not distended. No rebound or guarding.  MUSCULOSKELETAL: Grossly normal inspection.  No joint swelling or obvious deformities.  Range of motion intact  EXTREMITIES: No peripheral edema. Stasis skin changes   NEUROLOGICAL: A&O X 3. CN II-XII are grossly " intact. No focal deficits.   SKIN: Warm and dry, no lesions, no rashes.  PSYCH: Appropriate mood and affect.      Assessment/Plan:    LAURA on CKD III, baseline ~1.5. Appears to be ATN  Acute Hyponatremia   Type II DM   HTN  HFrEF  HLD  Obesity   CAD  Prostate CA    Recent admission for hematuria c/b LAURA. Appears to have progressed since discharge. [place] brown urine concerning for ATN. Unclear trigger. Diuretic was decreased and ACEI held last few days. Does not appear volume down. Deal was flushed and is draining. Check renal US to ensure no obstruction. Challenge with IVF overnight. Consult nephrology.   SSI        DVT Prophylaxis: heparin  Code Status: full code  Disposition: admit       dK Verduzco,   [facility]

## 2023-12-14 NOTE — PROGRESS NOTES
Subjective  Pt says hematuria has stopped. He c/o chronic bilateral buttocks pain, R heel pain. He denies radiculopathy. He has chronic numbness of his hands.    Objective    Vitals:    12/14/23 0811   BP: 120/59   Pulse: 68   Resp: 18   Temp:    SpO2: 95%       Vitals:    12/13/23 2111   Weight: 104 kg (230 lb)       Scheduled medications    Continuous medications    PRN medications      Results for orders placed or performed during the hospital encounter of 12/13/23 (from the past 24 hour(s))   CBC and Auto Differential   Result Value Ref Range    WBC 11.9 (H) 4.4 - 11.3 x10*3/uL    nRBC 0.0 0.0 - 0.0 /100 WBCs    RBC 4.03 (L) 4.50 - 5.90 x10*6/uL    Hemoglobin 12.5 (L) 13.5 - 17.5 g/dL    Hematocrit 37.5 (L) 41.0 - 52.0 %    MCV 93 80 - 100 fL    MCH 31.0 26.0 - 34.0 pg    MCHC 33.3 32.0 - 36.0 g/dL    RDW 12.9 11.5 - 14.5 %    Platelets 216 150 - 450 x10*3/uL    Neutrophils % 80.4 40.0 - 80.0 %    Immature Granulocytes %, Automated 0.6 0.0 - 0.9 %    Lymphocytes % 6.9 13.0 - 44.0 %    Monocytes % 9.3 2.0 - 10.0 %    Eosinophils % 2.4 0.0 - 6.0 %    Basophils % 0.4 0.0 - 2.0 %    Neutrophils Absolute 9.57 (H) 1.60 - 5.50 x10*3/uL    Immature Granulocytes Absolute, Automated 0.07 0.00 - 0.50 x10*3/uL    Lymphocytes Absolute 0.82 0.80 - 3.00 x10*3/uL    Monocytes Absolute 1.11 (H) 0.05 - 0.80 x10*3/uL    Eosinophils Absolute 0.29 0.00 - 0.40 x10*3/uL    Basophils Absolute 0.05 0.00 - 0.10 x10*3/uL   Magnesium   Result Value Ref Range    Magnesium 2.18 1.60 - 2.40 mg/dL   Comprehensive metabolic panel   Result Value Ref Range    Glucose 166 (H) 74 - 99 mg/dL    Sodium 126 (L) 136 - 145 mmol/L    Potassium 6.9 (HH) 3.5 - 5.3 mmol/L    Chloride 92 (L) 98 - 107 mmol/L    Bicarbonate 26 21 - 32 mmol/L    Anion Gap 15 10 - 20 mmol/L    Urea Nitrogen 43 (H) 6 - 23 mg/dL    Creatinine 2.89 (H) 0.50 - 1.30 mg/dL    eGFR 21 (L) >60 mL/min/1.73m*2    Calcium 8.4 (L) 8.6 - 10.3 mg/dL    Albumin 3.5 3.4 - 5.0 g/dL    Alkaline  Phosphatase 66 33 - 136 U/L    Total Protein 6.7 6.4 - 8.2 g/dL    AST 31 9 - 39 U/L    Bilirubin, Total 0.9 0.0 - 1.2 mg/dL    ALT 8 (L) 10 - 52 U/L   Creatine Kinase   Result Value Ref Range    Creatine Kinase 103 0 - 325 U/L   Urinalysis with Reflex Microscopic and Culture   Result Value Ref Range    Color, Urine Yellow Straw, Yellow    Appearance, Urine Hazy (N) Clear    Specific Gravity, Urine 1.011 1.005 - 1.035    pH, Urine 5.0 5.0, 5.5, 6.0, 6.5, 7.0, 7.5, 8.0    Protein, Urine 100 (2+) (N) NEGATIVE mg/dL    Glucose, Urine NEGATIVE NEGATIVE mg/dL    Blood, Urine LARGE (3+) (A) NEGATIVE    Ketones, Urine NEGATIVE NEGATIVE mg/dL    Bilirubin, Urine NEGATIVE NEGATIVE    Urobilinogen, Urine <2.0 <2.0 mg/dL    Nitrite, Urine NEGATIVE NEGATIVE    Leukocyte Esterase, Urine MODERATE (2+) (A) NEGATIVE   Extra Urine Gray Tube   Result Value Ref Range    Extra Tube Hold for add-ons.    Microscopic Only, Urine   Result Value Ref Range    WBC, Urine >50 (A) 1-5, NONE /HPF    WBC Clumps, Urine MANY Reference range not established. /HPF    RBC, Urine >20 (A) NONE, 1-2, 3-5 /HPF    Squamous Epithelial Cells, Urine 1-9 (SPARSE) Reference range not established. /HPF    Bacteria, Urine 1+ (A) NONE SEEN /HPF    Mucus, Urine 2+ Reference range not established. /LPF    Hyaline Casts, Urine 3+ (A) NONE /LPF    Amorphous Crystals, Urine 2+ NONE, 1+, 2+ /HPF   Potassium   Result Value Ref Range    Potassium 4.4 3.5 - 5.3 mmol/L       Constitutional: Well developed, awake, alert, oriented x3, no acute distress, cooperative   Eyes: EOMI, clear sclera   ENMT: mucous membranes moist, no lesions seen   Head/Neck: Neck supple, no apparent injury, head atraumatic   Respiratory/Thorax: CTAB, good chest expansion, respirations even and unlabored   Cardiovascular: Regular rate and rhythm, no murmurs/rubs/gallops, normal S1 and S 2   Gastrointestinal: Abdomen nondistended, +BS, no bruits   Musculoskeletal: ROM intact, no joint swelling, normal   strength   Extremities: no cyanosis, edema, contusions or clubbing   Neurological: no focal deficit, pt alert and oriented x3   Psychological: Appropriate affect and behavior   Skin: Warm and dry, no lesions, no rashes  Genitourinary: Deal draining clear yellow urine       Past Medical History:   Diagnosis Date    CAD (coronary artery disease)     CHF (congestive heart failure) (CMS/[facility])     EF 40-45%    Chronic indwelling Deal catheter     Chronic renal disease, stage III (CMS/[facility])     Diabetes mellitus (CMS/[facility])     Eczema     Hiatal hernia     Hypertension     Infarction of spleen     Intracardiac thrombosis, not elsewhere classified     Apical mural thrombus    Old myocardial infarction     Prostate cancer (CMS/[facility])     Pulmonary nodules     Renal cyst     bilateral    TIA (transient ischemic attack)         Assessment/Plan  LAURA on CKD III     - baseline ~1.5, nephrology consulted, repeat BMP today, renal US ordered     - last admission torsemide was decreased and ACEI held   Hematuria  Hx prostate CA with urinary retention/chronic Deal     - pt admitted last week with hematuria, he follows w/Dr. Quintanilla, had stopped taking Xtandi      - catheter was exchanged, urine cx was negative, repeat is pending     - consult urology, continue tamsulosin  Acute hyponatremia      - sodium 126, repeat BMP now, pt given NS, continued, check sodium studies  Type II DM      - endocrinology consulted last admission and his regimen was adjusted     - he will follow up outpatient  HTN     - BP controlled, continue home meds  DVT Prophylaxis: subcutaneous heparin  Discharge disposition     - home when stable      Leana Kat, CNP  Hospital Medicine

## 2023-12-14 NOTE — CONSULTS
"Nephrology Consult Note    Reason For Consult  Elevated creatinine    History Of Present Illness  Dennis Galo is a 83 y.o. male with PMH of CKD, prostate ca, DM, CHF, HL, obesity, CAD s/p CABG admitted day after he was discharged with hematuria, found to have severe LAURA and readmitted  Very poor historian, denies poor po intake, diarrhea, liu flushed in the ED  Of note, he was diuresed with very high dose torsemide during last admission, he was discharged on 40 mg every day daily and his losartan dose was held  Renal consult obtained for management of elevated creatinine     Past Medical History  He has a past medical history of CAD (coronary artery disease), CHF (congestive heart failure) (CMS/[facility]), Chronic indwelling Liu catheter, Chronic renal disease, stage III (CMS/HCC), Diabetes mellitus (CMS/HCC), Eczema, Hiatal hernia, Hypertension, Infarction of spleen, Intracardiac thrombosis, not elsewhere classified, Old myocardial infarction, Prostate cancer (CMS/HCC), Pulmonary nodules, Renal cyst, and TIA (transient ischemic attack).    Surgical History  He has a past surgical history that includes Pilonidal cyst drainage (07/26/2018); Cardiac catheterization (2013); Prostate surgery; and Coronary artery bypass graft.     Social History  He reports that he quit smoking about 15 years ago. His smoking use included cigarettes. He has never used smokeless tobacco. He reports that he does not drink alcohol and does not use drugs.    Family History  No family history on file.     Allergies  Terazosin    Review of Systems  Per HPI     Physical Exam    Vitals 24HR  Heart Rate:  [66-70]   Temp:  [36.8 °C (98.2 °F)]   Resp:  [16-20]   BP: (115-136)/(54-64)   Height:  [177.8 cm (5' 10\")]   Weight:  [104 kg (230 lb)]   SpO2:  [92 %-98 %]          GENERAL: Well developed and in no apparent distress. Alert and cooperative.  HEENT: Normocephalic and atraumatic.  Clear sclera.  CARDIOVASCULAR: Regular rate and rhythm.  No " murmurs, rubs, or gallops. S1/S2.  RESPIRATORY: Clear to auscultation b/l. No wheezes, rales or rhonchi. Normal effort.   ABDOMEN: Soft, non-tender. Not distended. No rebound or guarding.  MUSCULOSKELETAL: Grossly normal inspection.  No joint swelling or obvious deformities.  Range of motion intact  EXTREMITIES: No peripheral edema. Stasis skin changes   NEUROLOGICAL: A&O X 3. CN II-XII are grossly intact. No focal deficits.   SKIN: chronic skin changes, wrapped  PSYCH: Appropriate mood and affect.           I&O 24HR    Intake/Output Summary (Last 24 hours) at 12/14/2023 1500  Last data filed at 12/13/2023 2112  Gross per 24 hour   Intake 50 ml   Output --   Net 50 ml       Scheduled medications  aspirin, 81 mg, oral, Daily  atorvastatin, 80 mg, oral, Daily  gabapentin, 100 mg, oral, TID  heparin (porcine), 5,000 Units, subcutaneous, q8h  insulin lispro, 0-10 Units, subcutaneous, TID with meals  isosorbide mononitrate ER, 30 mg, oral, Daily  metoprolol tartrate, 25 mg, oral, BID  tamsulosin, 0.4 mg, oral, Daily      Continuous medications  sodium chloride 0.9%, 75 mL/hr, Last Rate: 75 mL/hr (12/14/23 1354)      PRN medications  PRN medications: acetaminophen, dextrose 10 % in water (D10W), dextrose, glucagon, oxyCODONE-acetaminophen, oxyCODONE-acetaminophen    Relevant Results      Admission on 12/13/2023   Component Date Value Ref Range Status    WBC 12/13/2023 11.9 (H)  4.4 - 11.3 x10*3/uL Final    nRBC 12/13/2023 0.0  0.0 - 0.0 /100 WBCs Final    RBC 12/13/2023 4.03 (L)  4.50 - 5.90 x10*6/uL Final    Hemoglobin 12/13/2023 12.5 (L)  13.5 - 17.5 g/dL Final    Hematocrit 12/13/2023 37.5 (L)  41.0 - 52.0 % Final    MCV 12/13/2023 93  80 - 100 fL Final    MCH 12/13/2023 31.0  26.0 - 34.0 pg Final    MCHC 12/13/2023 33.3  32.0 - 36.0 g/dL Final    RDW 12/13/2023 12.9  11.5 - 14.5 % Final    Platelets 12/13/2023 216  150 - 450 x10*3/uL Final    Neutrophils % 12/13/2023 80.4  40.0 - 80.0 % Final    Immature Granulocytes  %, Automated 12/13/2023 0.6  0.0 - 0.9 % Final    Immature Granulocyte Count (IG) includes promyelocytes, myelocytes and metamyelocytes but does not include bands. Percent differential counts (%) should be interpreted in the context of the absolute cell counts (cells/UL).    Lymphocytes % 12/13/2023 6.9  13.0 - 44.0 % Final    Monocytes % 12/13/2023 9.3  2.0 - 10.0 % Final    Eosinophils % 12/13/2023 2.4  0.0 - 6.0 % Final    Basophils % 12/13/2023 0.4  0.0 - 2.0 % Final    Neutrophils Absolute 12/13/2023 9.57 (H)  1.60 - 5.50 x10*3/uL Final    Percent differential counts (%) should be interpreted in the context of the absolute cell counts (cells/uL).    Immature Granulocytes Absolute, Au* 12/13/2023 0.07  0.00 - 0.50 x10*3/uL Final    Lymphocytes Absolute 12/13/2023 0.82  0.80 - 3.00 x10*3/uL Final    Monocytes Absolute 12/13/2023 1.11 (H)  0.05 - 0.80 x10*3/uL Final    Eosinophils Absolute 12/13/2023 0.29  0.00 - 0.40 x10*3/uL Final    Basophils Absolute 12/13/2023 0.05  0.00 - 0.10 x10*3/uL Final    Magnesium 12/13/2023 2.18  1.60 - 2.40 mg/dL Final    MARKED HEMOLYSIS DETECTED. The result may be falsely elevated due to hemolysis or other interferents. Clinical correlation is recommended. Repeat testing may be considered.    Glucose 12/13/2023 166 (H)  74 - 99 mg/dL Final    Sodium 12/13/2023 126 (L)  136 - 145 mmol/L Final    Potassium 12/13/2023 6.9 (HH)  3.5 - 5.3 mmol/L Final    MARKED HEMOLYSIS DETECTED. The result may be falsely elevated due to hemolysis or other interferents. Clinical correlation is recommended. Repeat testing may be considered.    Chloride 12/13/2023 92 (L)  98 - 107 mmol/L Final    Bicarbonate 12/13/2023 26  21 - 32 mmol/L Final    Anion Gap 12/13/2023 15  10 - 20 mmol/L Final    Urea Nitrogen 12/13/2023 43 (H)  6 - 23 mg/dL Final    Creatinine 12/13/2023 2.89 (H)  0.50 - 1.30 mg/dL Final    eGFR 12/13/2023 21 (L)  >60 mL/min/1.73m*2 Final    Calculations of estimated GFR are performed  using the 2021 CKD-EPI Study Refit equation without the race variable for the IDMS-Traceable creatinine methods.  https://jasn.asnjournals.org/content/early/2021/09/22/ASN.4022454625    Calcium 12/13/2023 8.4 (L)  8.6 - 10.3 mg/dL Final    Albumin 12/13/2023 3.5  3.4 - 5.0 g/dL Final    MARKED HEMOLYSIS DETECTED. The result may be falsely elevated due to hemolysis or other interferents. Clinical correlation is recommended. Repeat testing may be considered.    Alkaline Phosphatase 12/13/2023 66  33 - 136 U/L Final    MARKED HEMOLYSIS DETECTED. The result may be falsely decreased due to hemolysis or other interferents. Clinical correlation is recommended. Repeat testing may be considered.    Total Protein 12/13/2023 6.7  6.4 - 8.2 g/dL Final    AST 12/13/2023 31  9 - 39 U/L Final    MARKED HEMOLYSIS DETECTED. The result may be falsely elevated due to hemolysis or other interferents. Clinical correlation is recommended. Repeat testing may be considered.    Bilirubin, Total 12/13/2023 0.9  0.0 - 1.2 mg/dL Final    ALT 12/13/2023 8 (L)  10 - 52 U/L Final    Patients treated with Sulfasalazine may generate falsely decreased results for ALT.    Creatine Kinase 12/13/2023 103  0 - 325 U/L Final    Color, Urine 12/13/2023 Yellow  Straw, Yellow Final    Appearance, Urine 12/13/2023 Hazy (N)  Clear Final    Specific Gravity, Urine 12/13/2023 1.011  1.005 - 1.035 Final    pH, Urine 12/13/2023 5.0  5.0, 5.5, 6.0, 6.5, 7.0, 7.5, 8.0 Final    Protein, Urine 12/13/2023 100 (2+) (N)  NEGATIVE mg/dL Final    Glucose, Urine 12/13/2023 NEGATIVE  NEGATIVE mg/dL Final    Blood, Urine 12/13/2023 LARGE (3+) (A)  NEGATIVE Final    Ketones, Urine 12/13/2023 NEGATIVE  NEGATIVE mg/dL Final    Bilirubin, Urine 12/13/2023 NEGATIVE  NEGATIVE Final    Urobilinogen, Urine 12/13/2023 <2.0  <2.0 mg/dL Final    Nitrite, Urine 12/13/2023 NEGATIVE  NEGATIVE Final    Leukocyte Esterase, Urine 12/13/2023 MODERATE (2+) (A)  NEGATIVE Final    Extra Tube  12/13/2023 Hold for add-ons.   Final    Auto resulted.    Potassium 12/13/2023 4.4  3.5 - 5.3 mmol/L Final    WBC, Urine 12/13/2023 >50 (A)  1-5, NONE /HPF Final    WBC Clumps, Urine 12/13/2023 MANY  Reference range not established. /HPF Final    RBC, Urine 12/13/2023 >20 (A)  NONE, 1-2, 3-5 /HPF Final    Squamous Epithelial Cells, Urine 12/13/2023 1-9 (SPARSE)  Reference range not established. /HPF Final    Bacteria, Urine 12/13/2023 1+ (A)  NONE SEEN /HPF Final    Mucus, Urine 12/13/2023 2+  Reference range not established. /LPF Final    Hyaline Casts, Urine 12/13/2023 3+ (A)  NONE /LPF Final    Amorphous Crystals, Urine 12/13/2023 2+  NONE, 1+, 2+ /HPF Final    Glucose 12/14/2023 189 (H)  74 - 99 mg/dL Final    Sodium 12/14/2023 131 (L)  136 - 145 mmol/L Final    Potassium 12/14/2023 4.1  3.5 - 5.3 mmol/L Final    Chloride 12/14/2023 96 (L)  98 - 107 mmol/L Final    Bicarbonate 12/14/2023 26  21 - 32 mmol/L Final    Anion Gap 12/14/2023 13  10 - 20 mmol/L Final    Urea Nitrogen 12/14/2023 43 (H)  6 - 23 mg/dL Final    Creatinine 12/14/2023 2.20 (H)  0.50 - 1.30 mg/dL Final    eGFR 12/14/2023 29 (L)  >60 mL/min/1.73m*2 Final    Calculations of estimated GFR are performed using the 2021 CKD-EPI Study Refit equation without the race variable for the IDMS-Traceable creatinine methods.  https://jasn.asnjournals.org/content/early/2021/09/22/ASN.5616950525    Calcium 12/14/2023 8.4 (L)  8.6 - 10.3 mg/dL Final    Sodium, Urine Random 12/14/2023 18  mmol/L Final    Creatinine, Urine Random 12/14/2023 78.5  20.0 - 370.0 mg/dL Final    Sodium/Creatinine Ratio 12/14/2023 23  Not established. mmol/g Creat Final    POCT Glucose 12/14/2023 213 (H)  74 - 99 mg/dL Final   Admission on 12/09/2023, Discharged on 12/12/2023   Component Date Value Ref Range Status    WBC 12/09/2023 7.5  4.4 - 11.3 x10*3/uL Final    nRBC 12/09/2023 0.0  0.0 - 0.0 /100 WBCs Final    RBC 12/09/2023 4.39 (L)  4.50 - 5.90 x10*6/uL Final    Hemoglobin  12/09/2023 13.5  13.5 - 17.5 g/dL Final    Hematocrit 12/09/2023 41.1  41.0 - 52.0 % Final    MCV 12/09/2023 94  80 - 100 fL Final    MCH 12/09/2023 30.8  26.0 - 34.0 pg Final    MCHC 12/09/2023 32.8  32.0 - 36.0 g/dL Final    RDW 12/09/2023 13.2  11.5 - 14.5 % Final    Platelets 12/09/2023 232  150 - 450 x10*3/uL Final    Neutrophils % 12/09/2023 74.8  40.0 - 80.0 % Final    Immature Granulocytes %, Automated 12/09/2023 0.4  0.0 - 0.9 % Final    Immature Granulocyte Count (IG) includes promyelocytes, myelocytes and metamyelocytes but does not include bands. Percent differential counts (%) should be interpreted in the context of the absolute cell counts (cells/UL).    Lymphocytes % 12/09/2023 13.0  13.0 - 44.0 % Final    Monocytes % 12/09/2023 7.6  2.0 - 10.0 % Final    Eosinophils % 12/09/2023 3.3  0.0 - 6.0 % Final    Basophils % 12/09/2023 0.9  0.0 - 2.0 % Final    Neutrophils Absolute 12/09/2023 5.61 (H)  1.60 - 5.50 x10*3/uL Final    Percent differential counts (%) should be interpreted in the context of the absolute cell counts (cells/uL).    Immature Granulocytes Absolute, Au* 12/09/2023 0.03  0.00 - 0.50 x10*3/uL Final    Lymphocytes Absolute 12/09/2023 0.98  0.80 - 3.00 x10*3/uL Final    Monocytes Absolute 12/09/2023 0.57  0.05 - 0.80 x10*3/uL Final    Eosinophils Absolute 12/09/2023 0.25  0.00 - 0.40 x10*3/uL Final    Basophils Absolute 12/09/2023 0.07  0.00 - 0.10 x10*3/uL Final    Magnesium 12/09/2023 1.90  1.60 - 2.40 mg/dL Final    Glucose 12/09/2023 182 (H)  74 - 99 mg/dL Final    Sodium 12/09/2023 136  136 - 145 mmol/L Final    Potassium 12/09/2023 3.8  3.5 - 5.3 mmol/L Final    Chloride 12/09/2023 99  98 - 107 mmol/L Final    Bicarbonate 12/09/2023 31  21 - 32 mmol/L Final    Anion Gap 12/09/2023 10  10 - 20 mmol/L Final    Urea Nitrogen 12/09/2023 27 (H)  6 - 23 mg/dL Final    Creatinine 12/09/2023 1.69 (H)  0.50 - 1.30 mg/dL Final    eGFR 12/09/2023 40 (L)  >60 mL/min/1.73m*2 Final    Calculations  of estimated GFR are performed using the 2021 CKD-EPI Study Refit equation without the race variable for the IDMS-Traceable creatinine methods.  https://jasn.asnjournals.org/content/early/2021/09/22/ASN.4072478884    Calcium 12/09/2023 8.9  8.6 - 10.3 mg/dL Final    Albumin 12/09/2023 3.8  3.4 - 5.0 g/dL Final    Alkaline Phosphatase 12/09/2023 80  33 - 136 U/L Final    Total Protein 12/09/2023 6.3 (L)  6.4 - 8.2 g/dL Final    AST 12/09/2023 13  9 - 39 U/L Final    Bilirubin, Total 12/09/2023 0.8  0.0 - 1.2 mg/dL Final    ALT 12/09/2023 10  10 - 52 U/L Final    Patients treated with Sulfasalazine may generate falsely decreased results for ALT.    Protime 12/09/2023 12.8  9.8 - 12.8 seconds Final    INR 12/09/2023 1.1  0.9 - 1.1 Final    aPTT 12/09/2023 36  27 - 38 seconds Final    ABO TYPE 12/09/2023 O   Final    Rh TYPE 12/09/2023 POS   Final    ANTIBODY SCREEN 12/09/2023 NEG   Final    Color, Urine 12/09/2023 Yellow  Straw, Yellow Final    Appearance, Urine 12/09/2023 Clear  Clear Final    Specific Gravity, Urine 12/09/2023 1.012  1.005 - 1.035 Final    pH, Urine 12/09/2023 6.0  5.0, 5.5, 6.0, 6.5, 7.0, 7.5, 8.0 Final    Protein, Urine 12/09/2023 100 (2+) (N)  NEGATIVE mg/dL Final    Glucose, Urine 12/09/2023 NEGATIVE  NEGATIVE mg/dL Final    Blood, Urine 12/09/2023 MODERATE (2+) (A)  NEGATIVE Final    Ketones, Urine 12/09/2023 NEGATIVE  NEGATIVE mg/dL Final    Bilirubin, Urine 12/09/2023 NEGATIVE  NEGATIVE Final    Urobilinogen, Urine 12/09/2023 <2.0  <2.0 mg/dL Final    Nitrite, Urine 12/09/2023 NEGATIVE  NEGATIVE Final    Leukocyte Esterase, Urine 12/09/2023 MODERATE (2+) (A)  NEGATIVE Final    Extra Tube 12/09/2023 Hold for add-ons.   Final    Auto resulted.    WBC, Urine 12/09/2023 21-50 (A)  1-5, NONE /HPF Final    WBC Clumps, Urine 12/09/2023 RARE  Reference range not established. /HPF Final    RBC, Urine 12/09/2023 6-10 (A)  NONE, 1-2, 3-5 /HPF Final    Hyaline Casts, Urine 12/09/2023 OCCASIONAL (A)  NONE  /LPF Final    Urine Culture 12/09/2023 No significant growth   Final    POCT Glucose 12/09/2023 65 (L)  74 - 99 mg/dL Final    Parathyroid Hormone, Intact 12/10/2023 87.9  18.5 - 88.0 pg/mL Final    Vitamin D, 25-Hydroxy, Total 12/10/2023 22 (L)  30 - 100 ng/mL Final    Hemoglobin A1C 12/10/2023 7.3 (H)  see below % Final    Estimated Average Glucose 12/10/2023 163  Not Established mg/dL Final    Prostate Specific AG 12/10/2023 4.83 (H)  <=4.00 ng/mL Final    AV pk erica 12/11/2023 0.96   Final    AV mn grad 12/11/2023 2.0   Final    LVOT diam 12/11/2023 2.30   Final    LV biplane EF 12/11/2023 40   Final    MV E/A ratio 12/11/2023 1.73   Final    LA vol index A/L 12/11/2023 22.4   Final    Tricuspid annular plane systolic e* 12/11/2023 2.4   Final    RV free wall pk S' 12/11/2023 12.70   Final    LVIDd 12/11/2023 4.87   Final    Aortic Valve Area by Continuity of* 12/11/2023 4.18   Final    Aortic Valve Area by Continuity of* 12/11/2023 3.72   Final    AV pk grad 12/11/2023 3.6   Final    LV A4C EF 12/11/2023 45.4   Final    WBC 12/09/2023 6.3  4.4 - 11.3 x10*3/uL Final    nRBC 12/09/2023 0.0  0.0 - 0.0 /100 WBCs Final    RBC 12/09/2023 4.04 (L)  4.50 - 5.90 x10*6/uL Final    Hemoglobin 12/09/2023 12.5 (L)  13.5 - 17.5 g/dL Final    Hematocrit 12/09/2023 37.5 (L)  41.0 - 52.0 % Final    MCV 12/09/2023 93  80 - 100 fL Final    MCH 12/09/2023 30.9  26.0 - 34.0 pg Final    MCHC 12/09/2023 33.3  32.0 - 36.0 g/dL Final    RDW 12/09/2023 13.2  11.5 - 14.5 % Final    Platelets 12/09/2023 207  150 - 450 x10*3/uL Final    WBC 12/10/2023 6.5  4.4 - 11.3 x10*3/uL Final    nRBC 12/10/2023 0.0  0.0 - 0.0 /100 WBCs Final    RBC 12/10/2023 4.22 (L)  4.50 - 5.90 x10*6/uL Final    Hemoglobin 12/10/2023 12.8 (L)  13.5 - 17.5 g/dL Final    Hematocrit 12/10/2023 39.5 (L)  41.0 - 52.0 % Final    MCV 12/10/2023 94  80 - 100 fL Final    MCH 12/10/2023 30.3  26.0 - 34.0 pg Final    MCHC 12/10/2023 32.4  32.0 - 36.0 g/dL Final    RDW  12/10/2023 13.1  11.5 - 14.5 % Final    Platelets 12/10/2023 248  150 - 450 x10*3/uL Final    Glucose 12/10/2023 42 (LL)  74 - 99 mg/dL Final    Sodium 12/10/2023 139  136 - 145 mmol/L Final    Potassium 12/10/2023 3.5  3.5 - 5.3 mmol/L Final    Chloride 12/10/2023 102  98 - 107 mmol/L Final    Bicarbonate 12/10/2023 30  21 - 32 mmol/L Final    Anion Gap 12/10/2023 11  10 - 20 mmol/L Final    Urea Nitrogen 12/10/2023 21  6 - 23 mg/dL Final    Creatinine 12/10/2023 1.36 (H)  0.50 - 1.30 mg/dL Final    eGFR 12/10/2023 52 (L)  >60 mL/min/1.73m*2 Final    Calculations of estimated GFR are performed using the 2021 CKD-EPI Study Refit equation without the race variable for the IDMS-Traceable creatinine methods.  https://jasn.asnjournals.org/content/early/2021/09/22/ASN.2306946047    Calcium 12/10/2023 8.4 (L)  8.6 - 10.3 mg/dL Final    Phosphorus 12/10/2023 3.4  2.5 - 4.9 mg/dL Final    The performance characteristics of phosphorus testing in heparinized plasma have been validated by the individual  laboratory site where testing is performed. Testing on heparinized plasma is not approved by the FDA; however, such approval is not necessary.    Albumin 12/10/2023 3.4  3.4 - 5.0 g/dL Final    POCT Glucose 12/09/2023 158 (H)  74 - 99 mg/dL Final    POCT Glucose 12/10/2023 45 (L)  74 - 99 mg/dL Final    RN/MD NOTIFIED    POCT Glucose 12/10/2023 89  74 - 99 mg/dL Final    POCT Glucose 12/10/2023 136 (H)  74 - 99 mg/dL Final    POCT Glucose 12/10/2023 129 (H)  74 - 99 mg/dL Final    POCT Glucose 12/10/2023 138 (H)  74 - 99 mg/dL Final    WBC 12/11/2023 8.3  4.4 - 11.3 x10*3/uL Final    nRBC 12/11/2023 0.0  0.0 - 0.0 /100 WBCs Final    RBC 12/11/2023 4.11 (L)  4.50 - 5.90 x10*6/uL Final    Hemoglobin 12/11/2023 12.5 (L)  13.5 - 17.5 g/dL Final    Hematocrit 12/11/2023 38.4 (L)  41.0 - 52.0 % Final    MCV 12/11/2023 93  80 - 100 fL Final    MCH 12/11/2023 30.4  26.0 - 34.0 pg Final    MCHC 12/11/2023 32.6  32.0 - 36.0 g/dL Final     RDW 12/11/2023 13.1  11.5 - 14.5 % Final    Platelets 12/11/2023 226  150 - 450 x10*3/uL Final    Glucose 12/11/2023 75  74 - 99 mg/dL Final    Sodium 12/11/2023 138  136 - 145 mmol/L Final    Potassium 12/11/2023 3.7  3.5 - 5.3 mmol/L Final    Chloride 12/11/2023 101  98 - 107 mmol/L Final    Bicarbonate 12/11/2023 29  21 - 32 mmol/L Final    Anion Gap 12/11/2023 12  10 - 20 mmol/L Final    Urea Nitrogen 12/11/2023 21  6 - 23 mg/dL Final    Creatinine 12/11/2023 1.48 (H)  0.50 - 1.30 mg/dL Final    eGFR 12/11/2023 47 (L)  >60 mL/min/1.73m*2 Final    Calculations of estimated GFR are performed using the 2021 CKD-EPI Study Refit equation without the race variable for the IDMS-Traceable creatinine methods.  https://jasn.asnjournals.org/content/early/2021/09/22/ASN.2132742158    Calcium 12/11/2023 8.4 (L)  8.6 - 10.3 mg/dL Final    Phosphorus 12/11/2023 3.5  2.5 - 4.9 mg/dL Final    The performance characteristics of phosphorus testing in heparinized plasma have been validated by the individual  laboratory site where testing is performed. Testing on heparinized plasma is not approved by the FDA; however, such approval is not necessary.    Albumin 12/11/2023 3.1 (L)  3.4 - 5.0 g/dL Final    POCT Glucose 12/10/2023 213 (H)  74 - 99 mg/dL Final    POCT Glucose 12/11/2023 81  74 - 99 mg/dL Final    POCT Glucose 12/11/2023 116 (H)  74 - 99 mg/dL Final    POCT Glucose 12/11/2023 190 (H)  74 - 99 mg/dL Final    WBC 12/12/2023 10.3  4.4 - 11.3 x10*3/uL Final    nRBC 12/12/2023 0.0  0.0 - 0.0 /100 WBCs Final    RBC 12/12/2023 4.05 (L)  4.50 - 5.90 x10*6/uL Final    Hemoglobin 12/12/2023 12.3 (L)  13.5 - 17.5 g/dL Final    Hematocrit 12/12/2023 37.8 (L)  41.0 - 52.0 % Final    MCV 12/12/2023 93  80 - 100 fL Final    MCH 12/12/2023 30.4  26.0 - 34.0 pg Final    MCHC 12/12/2023 32.5  32.0 - 36.0 g/dL Final    RDW 12/12/2023 13.0  11.5 - 14.5 % Final    Platelets 12/12/2023 204  150 - 450 x10*3/uL Final    Glucose 12/12/2023  146 (H)  74 - 99 mg/dL Final    Sodium 12/12/2023 134 (L)  136 - 145 mmol/L Final    Potassium 12/12/2023 4.1  3.5 - 5.3 mmol/L Final    Chloride 12/12/2023 98  98 - 107 mmol/L Final    Bicarbonate 12/12/2023 30  21 - 32 mmol/L Final    Anion Gap 12/12/2023 10  10 - 20 mmol/L Final    Urea Nitrogen 12/12/2023 25 (H)  6 - 23 mg/dL Final    Creatinine 12/12/2023 1.70 (H)  0.50 - 1.30 mg/dL Final    eGFR 12/12/2023 40 (L)  >60 mL/min/1.73m*2 Final    Calculations of estimated GFR are performed using the 2021 CKD-EPI Study Refit equation without the race variable for the IDMS-Traceable creatinine methods.  https://jasn.asnjournals.org/content/early/2021/09/22/ASN.5844762145    Calcium 12/12/2023 7.9 (L)  8.6 - 10.3 mg/dL Final    Phosphorus 12/12/2023 3.6  2.5 - 4.9 mg/dL Final    The performance characteristics of phosphorus testing in heparinized plasma have been validated by the individual  laboratory site where testing is performed. Testing on heparinized plasma is not approved by the FDA; however, such approval is not necessary.    Albumin 12/12/2023 3.0 (L)  3.4 - 5.0 g/dL Final    POCT Glucose 12/11/2023 228 (H)  74 - 99 mg/dL Final    POCT Glucose 12/12/2023 154 (H)  74 - 99 mg/dL Final    POCT Glucose 12/12/2023 162 (H)  74 - 99 mg/dL Final      US renal complete    Result Date: 12/14/2023  Interpreted By:  Amy Eric, STUDY: US RENAL COMPLETE;  12/14/2023 1:53 pm   INDICATION: Signs/Symptoms:LAURA, concern for obstruction.   COMPARISON: 02/07/2023   ACCESSION NUMBER(S): [id number]   ORDERING CLINICIAN: SYLVIA MAN   TECHNIQUE: Multiple images of the kidneys were obtained.   FINDINGS: RIGHT KIDNEY: The right kidney is  normal in size measuring  13.2 cm in length. On 02/07/2023, the right kidney measured 12.6 cm in length.   The echogenicity of the cortex of the right kidney is  within normal limits. There is a 3.3 cm simple cortical cyst in the upper pole of the right kidney. There is a 3.6 cm simple  cortical cyst in the lower pole of the right kidney. There is no intrarenal calculus or hydronephrosis.   LEFT KIDNEY: The left kidney is  normal in size measuring  13.4 cm in length. On 02/07/2023, the left kidney measured 11.1 cm in length   The echogenicity of the cortex of the left kidney is  within normal limits. There is a 5.5 cm simple upper pole left renal cortical cysts. There is a 2.8 cm simple lower pole left renal cortical cyst. There is no intrarenal calculus or hydronephrosis.   BLADDER: There is circumferential thickening of bladder wall with trabeculation.       1. Bilateral renal cysts; no hydronephrosis. 2. Bladder wall thickening and trabeculation.   MACRO: None   Signed by: Amy Eric 12/14/2023 2:00 PM Dictation workstation:   [id number]    Transthoracic Echo (TTE) Complete    Result Date: 12/11/2023    [facility], [address] [place] [id number] [phone] and                                 Fax [phone] TRANSTHORACIC ECHOCARDIOGRAM REPORT  Patient Name:      BEVERLY KO JENNY   Reading Physician:    [id number] Ayush Kaur MD Study Date:        12/11/2023          Ordering Provider:    [id number] HAYLIE URIBE MRN/PID:           [id number]            Fellow: Accession#:        [id number]        Nurse: Date of Birth/Age: 1940 / 83 years Sonographer:          ELROY Parson RDCS Gender:            M                   Additional Staff: Height:            177.80 cm           Admit Date:           12/9/2023 Weight:            103.87 kg           Admission Status:     Observation -                                                              Priority discharge BSA:               2.21 m2             Encounter#:           [id number]                                        Department Location:  [facility] Blood Pressure: 139 /65 mmHg Study Type:     TRANSTHORACIC ECHO (TTE) COMPLETE Diagnosis/ICD: Chronic systolic (congestive) heart failure (CHF)-I50.22 Indication:    Congestive Heart Failure CPT Code:      Echo Complete w Full [id number] Patient History: Pertinent History: CAD and CHF. DM. Study Detail: The following Echo studies were performed: 2D, M-Mode, Doppler and               color flow. Technically challenging study due to body habitus and               prominent lung artifact.  PHYSICIAN INTERPRETATION: Left Ventricle: The left ventricular systolic function is mildly to moderately decreased, with an estimated ejection fraction of 40-45%. Wall motion is abnormal. The left ventricular cavity size is normal. Left ventricular diastolic filling was indeterminate. There is evidence of a small to moderate apical myocardial infarction. Left Atrium: The left atrium is mildly dilated. Right Ventricle: The right ventricle is normal in size. There is normal right ventricular global systolic function. Right Atrium: The right atrium is moderately to severely dilated. Aortic Valve: The aortic valve is trileaflet. There is no evidence of aortic valve regurgitation. The peak instantaneous gradient of the aortic valve is 3.6 mmHg. The mean gradient of the aortic valve is 2.0 mmHg. Mitral Valve: The mitral valve is mild to moderately thickened. There is mild to moderate mitral valve regurgitation. Tricuspid Valve: The tricuspid valve is structurally normal. There is trace to mild tricuspid regurgitation. The right ventricular systolic pressure is unable to be estimated. Pulmonic Valve: The pulmonic valve is structurally normal. There is trace pulmonic valve regurgitation. Pericardium: There is no pericardial effusion noted. Aorta: The aortic root is normal. There is no dilatation of the ascending aorta. There is no dilatation of the aortic root. Systemic Veins: The inferior vena cava appears mildly dilated.  CONCLUSIONS:  1. Left ventricular systolic function is  mildly to moderately decreased with a 40-45% estimated ejection fraction.  2. There is a small to moderate apical myocardial infarction.  3. The right atrium is moderately to severely dilated.  4. Mild to moderate mitral valve regurgitation. QUANTITATIVE DATA SUMMARY: 2D MEASUREMENTS:                          Normal Ranges: Ao Root d:     3.40 cm   (2.0-3.7cm) LAs:           4.30 cm   (2.7-4.0cm) IVSd:          1.14 cm   (0.6-1.1cm) LVPWd:         1.16 cm   (0.6-1.1cm) LVIDd:         4.87 cm   (3.9-5.9cm) LVIDs:         3.07 cm LV Mass Index: 95.5 g/m2 LV % FS        37.0 % LA VOLUME:                               Normal Ranges: LA Vol A4C:        36.9 ml    (22+/-6mL/m2) LA Vol A2C:        61.8 ml LA Vol BP:         49.5 ml LA Vol Index A4C:  16.7ml/m2 LA Vol Index A2C:  28.0 ml/m2 LA Vol Index BP:   22.4 ml/m2 LA Area A4C:       16.0 cm2 LA Area A2C:       20.0 cm2 LA Major Axis A4C: 5.9 cm LA Major Axis A2C: 5.5 cm LA Volume Index:   21.0 ml/m2 RA VOLUME BY A/L METHOD:                       Normal Ranges: RA Area A4C: 17.0 cm2 M-MODE MEASUREMENTS:                  Normal Ranges: Ao Root: 4.10 cm (2.0-3.7cm) LAs:     4.90 cm (2.7-4.0cm) AORTA MEASUREMENTS:                    Normal Ranges: Asc Ao, d: 3.40 cm (2.1-3.4cm) LV SYSTOLIC FUNCTION BY 2D PLANIMETRY (MOD):                     Normal Ranges: EF-A4C View: 45.4 % (>=55%) EF-A2C View: 32.6 % EF-Biplane:  39.8 % LV DIASTOLIC FUNCTION:                        Normal Ranges: MV Peak E:    0.76 m/s (0.7-1.2 m/s) MV Peak A:    0.44 m/s (0.42-0.7 m/s) E/A Ratio:    1.73     (1.0-2.2) MV lateral e' 0.08 m/s MV medial e'  0.08 m/s MITRAL VALVE:                 Normal Ranges: MV DT: 191 msec (150-240msec) AORTIC VALVE:                                   Normal Ranges: AoV Vmax:                0.96 m/s (<=1.7m/s) AoV Peak PG:             3.6 mmHg (<20mmHg) AoV Mean P.0 mmHg (1.7-11.5mmHg) LVOT Max Pal:            0.85 m/s (<=1.1m/s) AoV VTI:                  19.70 cm (18-25cm) LVOT VTI:                19.80 cm LVOT Diameter:           2.30 cm  (1.8-2.4cm) AoV Area, VTI:           4.18 cm2 (2.5-5.5cm2) AoV Area,Vmax:           3.72 cm2 (2.5-4.5cm2) AoV Dimensionless Index: 1.01  RIGHT VENTRICLE: RV Basal 3.81 cm TAPSE:   23.9 mm RV s'    0.13 m/s TRICUSPID VALVE/RVSP:                   Normal Ranges: IVC Diam: 2.20 cm PULMONIC VALVE:                      Normal Ranges: PV Max Pal: 0.9 m/s  (0.6-0.9m/s) PV Max PG:  3.0 mmHg  [id number] Ayush Kaur MD Electronically signed on 12/11/2023 at 11:27:48 AM  ** Final **     CT chest high resolution    Result Date: 12/10/2023  Interpreted By:  Romario Nguyen, STUDY: CT CHEST HIGH RESOLUTION;  12/10/2023 10:08 am   INDICATION: Signs/Symptoms:prostate CA rule out any mets.   COMPARISON: 08/26/2022   ACCESSION NUMBER(S): [id number]   ORDERING CLINICIAN: HAYLIE URIBE   TECHNIQUE: Helical data acquisition of the chest was obtained  without IV contrast material.  Images were reformatted in axial, coronal, and sagittal planes.   FINDINGS: LUNGS and AIRWAYS: Overall there has been increase in multiple pulmonary nodules. Index nodules include a 4 mm nodule, and 9 mm ground-glass nodule in the right lung on image 49 of series 605, with the ground-glass nodule previously measuring 1 cm and denser. A 5 mm nodule on image 268 previously measured approximately 7 mm. In the left lung a 6 mm ground-glass nodule on image 154 previously measured 8 mm, and a 4 mm nodule on image 140 measuring approximately 6 mm. There has also been resolution of several nodules. Opacities at the lung bases posteriorly is probably due to compressive atelectasis.   MEDIASTINUM and LU, LOWER NECK AND AXILLA: The visualized thyroid gland is within normal limits.   No evidence of thoracic lymphadenopathy by CT criteria.   HEART and VESSELS: The thoracic aorta is of normal course and caliber without significant atherosclerotic calcification .   Main pulmonary artery and  its branches are normal in caliber.   There is radiodensity along the courses of the coronary arteries indicating extensive atherosclerotic calcification and/or stents.   The cardiac chambers are not enlarged.   UPPER ABDOMEN: The visualized subdiaphragmatic structures demonstrate no remarkable findings.   CHEST WALL, OSSEOUS STRUCTURES AND OTHER FINDINGS: There are no suspicious osseous lesions.       1.  Improvement of pulmonary nodules presumably from metastatic disease.   Signed by: Romario Nguyen 12/10/2023 1:24 PM Dictation workstation:   [id number]    CT lumbar spine wo IV contrast    Result Date: 12/9/2023  Interpreted By:  Nehemias Pantoja, STUDY: CT LUMBAR SPINE WO IV CONTRAST;  12/9/2023 6:31 pm   INDICATION: Signs/Symptoms:back and buttock pain.   COMPARISON: None.   ACCESSION NUMBER(S): [id number]   ORDERING CLINICIAN: HAYLIE URIBE   TECHNIQUE: Axial noncontrast images of the lumbar spine with coronal and sagittal reconstructed images.   FINDINGS: ALIGNMENT: No traumatic spondylolisthesis or traumatic facet widening.   VERTEBRAE: No acute fracture. Vertebral body heights are maintained.   SPINAL CANAL/INTERVERTEBRAL DISCS: Multilevel disc space height loss most pronounced at L4-L5 with multilevel posterior disc bulge. Ligamentum flavum hypertrophy with variable degrees of spinal canal stenosis noted throughout the lumbar spine moderate-to-severe at L2-L3 and L4-L5.   PARASPINAL SOFT TISSUES: No significant abnormality.   VISUALIZED ABDOMEN: The urinary bladder is decompressed with a Deal catheter. Bladder wall thickening. Correlate for evidence of cystitis.       No acute fracture or traumatic malalignment of the lumbar spine.   Multilevel degenerative changes throughout the lumbar spine with moderate-to-severe spinal canal stenosis at L2-L3 and L4-L5.   Signed by: Nehemias Pantoja 12/9/2023 6:43 PM Dictation workstation:   [id number]    CT abdomen pelvis wo IV contrast    Result Date:  12/9/2023  Interpreted By:  Minh Pedraza, STUDY: CT ABDOMEN PELVIS WO IV CONTRAST;  12/9/2023 4:07 pm   INDICATION: Signs/Symptoms:hematuria, pain, h/o cancer.   COMPARISON: CT abdomen and pelvis 07/27/2022   ACCESSION NUMBER(S): [id number]   ORDERING CLINICIAN: ARIEL RAZA   TECHNIQUE: CT of the abdomen and pelvis was performed. Contiguous axial images were obtained at 3 mm slice thickness through the abdomen and pelvis. Coronal and sagittal reconstructions at 3 mm slice thickness were performed.  No intravenous contrast was administered.   FINDINGS: Please note that the evaluation of vessels, lymph nodes and organs is limited without intravenous contrast.   LOWER CHEST: Bibasilar atelectasis.   ABDOMEN:   LIVER: The liver demonstrates homogeneous attenuation without evidence of focal liver lesions.   BILE DUCTS: The intrahepatic and extrahepatic ducts are not dilated.   GALLBLADDER: The gallbladder is nondistended and without evidence of radiopaque stones.   PANCREAS: The pancreas appears unremarkable without evidence of ductal dilatation or masses.   SPLEEN: The spleen is normal in size without focal lesions.   ADRENAL GLANDS: Bilateral adrenal glands appear normal.   KIDNEYS AND URETERS: The kidneys are normal in size and unremarkable in appearance. Multiple bilateral simple cysts, the largest in the left interpolar region measures 5.5 cm. No hydroureteronephrosis or nephroureterolithiasis is identified.   PELVIS:   BLADDER: Decompressed urinary bladder Deal catheter in-situ.   REPRODUCTIVE ORGANS: No pelvic masses.   BOWEL: Small hiatal hernia. Stomach is otherwise unremarkable. No bowel dilation or wall thickening. Moderate colonic stool. The appendix is not definitely visualized. There is however no pericecal stranding or fluid.   VESSELS: Moderate atherosclerotic changes are noted to the aorta and branching vessels. There is no aneurysmal dilatation.   PERITONEUM/RETROPERITONEUM/LYMPH NODES: There  is no free or loculated fluid collection, no free intraperitoneal air. The retroperitoneum appears normal.  No abdominopelvic lymphadenopathy is present.   ABDOMINAL WALL: The abdominal wall soft tissues appear normal.   BONES: No suspicious osseous lesions are identified. Degenerative discogenic disease is noted in the lower thoracic and lumbar spine.       No acute findings in the abdomen and pelvis. Moderate colonic stool.   Signed by: Minh Pedraza 12/9/2023 4:27 PM Dictation workstation:   [id number]      Medications  (Not in a hospital admission)     Scheduled medications  aspirin, 81 mg, oral, Daily  atorvastatin, 80 mg, oral, Daily  gabapentin, 100 mg, oral, TID  heparin (porcine), 5,000 Units, subcutaneous, q8h  insulin lispro, 0-10 Units, subcutaneous, TID with meals  isosorbide mononitrate ER, 30 mg, oral, Daily  metoprolol tartrate, 25 mg, oral, BID  tamsulosin, 0.4 mg, oral, Daily      Continuous medications  sodium chloride 0.9%, 75 mL/hr, Last Rate: 75 mL/hr (12/14/23 7284)      PRN medications  PRN medications: acetaminophen, dextrose 10 % in water (D10W), dextrose, glucagon, oxyCODONE-acetaminophen, oxyCODONE-acetaminophen    Assessment/Plan   83 y.o. male with PMH of CKD, prostate ca, DM, CHF, HL, obesity, CAD s/p CABG admitted day after he was discharged with hematuria, found to have severe LAURA and readmitted    LAURA on CKD stage 3, baseline creatinine 1-1.5 was 1.7 on discharge and up to 2.89 on readmission  Volume overload, improved  Hyponatremia/HK, renal disease, improved with ivf  BL renal cysts  Hematuria, underlying metastatic prostate ca     Plan  - continue to hold diuretics, continue ivf today  - hNa/HK and creaitnine improving already with ivf, continue today  - avoid hypotension and nephrotoxins  - unclear to me reason for high dose torsemide, hold for now  - trend hh  - maintain liu and fup with urology after the dc  - give lopressor with holding parameters  - intermittent and  now resolved hematuria, oncology input noted during last admission          MARS reviewed, dose for GFR<20   Thank you for the opportunity to assist in the care of this patient, please call with questions  Nichole Fowler MD PhD

## 2023-12-15 LAB
ANION GAP SERPL CALC-SCNC: 11 MMOL/L (ref 10–20)
BUN SERPL-MCNC: 38 MG/DL (ref 6–23)
CALCIUM SERPL-MCNC: 7.8 MG/DL (ref 8.6–10.3)
CHLORIDE SERPL-SCNC: 99 MMOL/L (ref 98–107)
CO2 SERPL-SCNC: 27 MMOL/L (ref 21–32)
CREAT SERPL-MCNC: 1.82 MG/DL (ref 0.5–1.3)
ERYTHROCYTE [DISTWIDTH] IN BLOOD BY AUTOMATED COUNT: 12.8 % (ref 11.5–14.5)
GFR SERPL CREATININE-BSD FRML MDRD: 37 ML/MIN/1.73M*2
GLUCOSE BLD MANUAL STRIP-MCNC: 126 MG/DL (ref 74–99)
GLUCOSE BLD MANUAL STRIP-MCNC: 181 MG/DL (ref 74–99)
GLUCOSE BLD MANUAL STRIP-MCNC: 212 MG/DL (ref 74–99)
GLUCOSE BLD MANUAL STRIP-MCNC: 229 MG/DL (ref 74–99)
GLUCOSE SERPL-MCNC: 134 MG/DL (ref 74–99)
HCT VFR BLD AUTO: 32.2 % (ref 41–52)
HGB BLD-MCNC: 10.6 G/DL (ref 13.5–17.5)
MCH RBC QN AUTO: 30.5 PG (ref 26–34)
MCHC RBC AUTO-ENTMCNC: 32.9 G/DL (ref 32–36)
MCV RBC AUTO: 93 FL (ref 80–100)
NRBC BLD-RTO: 0 /100 WBCS (ref 0–0)
PLATELET # BLD AUTO: 208 X10*3/UL (ref 150–450)
POTASSIUM SERPL-SCNC: 3.9 MMOL/L (ref 3.5–5.3)
RBC # BLD AUTO: 3.48 X10*6/UL (ref 4.5–5.9)
SODIUM SERPL-SCNC: 133 MMOL/L (ref 136–145)
WBC # BLD AUTO: 7.5 X10*3/UL (ref 4.4–11.3)

## 2023-12-15 PROCEDURE — 1210000001 HC SEMI-PRIVATE ROOM DAILY

## 2023-12-15 PROCEDURE — 99221 1ST HOSP IP/OBS SF/LOW 40: CPT | Performed by: NURSE PRACTITIONER

## 2023-12-15 PROCEDURE — 96372 THER/PROPH/DIAG INJ SC/IM: CPT | Performed by: INTERNAL MEDICINE

## 2023-12-15 PROCEDURE — 80048 BASIC METABOLIC PNL TOTAL CA: CPT | Performed by: INTERNAL MEDICINE

## 2023-12-15 PROCEDURE — 2500000001 HC RX 250 WO HCPCS SELF ADMINISTERED DRUGS (ALT 637 FOR MEDICARE OP): Performed by: INTERNAL MEDICINE

## 2023-12-15 PROCEDURE — 82947 ASSAY GLUCOSE BLOOD QUANT: CPT

## 2023-12-15 PROCEDURE — 2500000002 HC RX 250 W HCPCS SELF ADMINISTERED DRUGS (ALT 637 FOR MEDICARE OP, ALT 636 FOR OP/ED): Performed by: INTERNAL MEDICINE

## 2023-12-15 PROCEDURE — 85027 COMPLETE CBC AUTOMATED: CPT | Performed by: INTERNAL MEDICINE

## 2023-12-15 PROCEDURE — 2500000001 HC RX 250 WO HCPCS SELF ADMINISTERED DRUGS (ALT 637 FOR MEDICARE OP): Performed by: NURSE PRACTITIONER

## 2023-12-15 PROCEDURE — 36415 COLL VENOUS BLD VENIPUNCTURE: CPT | Performed by: INTERNAL MEDICINE

## 2023-12-15 PROCEDURE — 99232 SBSQ HOSP IP/OBS MODERATE 35: CPT | Performed by: NURSE PRACTITIONER

## 2023-12-15 PROCEDURE — 2500000004 HC RX 250 GENERAL PHARMACY W/ HCPCS (ALT 636 FOR OP/ED): Performed by: INTERNAL MEDICINE

## 2023-12-15 RX ORDER — TORSEMIDE 20 MG/1
40 TABLET ORAL DAILY
Status: DISCONTINUED | OUTPATIENT
Start: 2023-12-16 | End: 2023-12-16 | Stop reason: HOSPADM

## 2023-12-15 RX ORDER — AMOXICILLIN 500 MG/1
500 CAPSULE ORAL EVERY 8 HOURS SCHEDULED
Status: DISCONTINUED | OUTPATIENT
Start: 2023-12-15 | End: 2023-12-16 | Stop reason: HOSPADM

## 2023-12-15 RX ADMIN — METOPROLOL TARTRATE 25 MG: 25 TABLET, FILM COATED ORAL at 08:22

## 2023-12-15 RX ADMIN — HEPARIN SODIUM 5000 UNITS: 5000 INJECTION INTRAVENOUS; SUBCUTANEOUS at 05:30

## 2023-12-15 RX ADMIN — GABAPENTIN 100 MG: 100 CAPSULE ORAL at 08:21

## 2023-12-15 RX ADMIN — ATORVASTATIN CALCIUM 80 MG: 80 TABLET, FILM COATED ORAL at 08:22

## 2023-12-15 RX ADMIN — ISOSORBIDE MONONITRATE 30 MG: 30 TABLET, EXTENDED RELEASE ORAL at 08:21

## 2023-12-15 RX ADMIN — INSULIN LISPRO 2 UNITS: 100 INJECTION, SOLUTION INTRAVENOUS; SUBCUTANEOUS at 12:04

## 2023-12-15 RX ADMIN — AMOXICILLIN 500 MG: 500 CAPSULE ORAL at 20:57

## 2023-12-15 RX ADMIN — HEPARIN SODIUM 5000 UNITS: 5000 INJECTION INTRAVENOUS; SUBCUTANEOUS at 20:57

## 2023-12-15 RX ADMIN — INSULIN LISPRO 4 UNITS: 100 INJECTION, SOLUTION INTRAVENOUS; SUBCUTANEOUS at 21:05

## 2023-12-15 RX ADMIN — GABAPENTIN 100 MG: 100 CAPSULE ORAL at 20:57

## 2023-12-15 RX ADMIN — ASPIRIN 81 MG CHEWABLE TABLET 81 MG: 81 TABLET CHEWABLE at 08:21

## 2023-12-15 RX ADMIN — HEPARIN SODIUM 5000 UNITS: 5000 INJECTION INTRAVENOUS; SUBCUTANEOUS at 12:04

## 2023-12-15 RX ADMIN — METOPROLOL TARTRATE 25 MG: 25 TABLET, FILM COATED ORAL at 20:57

## 2023-12-15 RX ADMIN — INSULIN LISPRO 4 UNITS: 100 INJECTION, SOLUTION INTRAVENOUS; SUBCUTANEOUS at 17:38

## 2023-12-15 RX ADMIN — TAMSULOSIN HYDROCHLORIDE 0.4 MG: 0.4 CAPSULE ORAL at 08:22

## 2023-12-15 ASSESSMENT — PAIN SCALES - GENERAL
PAINLEVEL_OUTOF10: 0 - NO PAIN
PAINLEVEL_OUTOF10: 0 - NO PAIN

## 2023-12-15 NOTE — CARE PLAN
Problem: Fall/Injury  Goal: Not fall by end of shift  Outcome: Progressing  Goal: Be free from injury by end of the shift  Outcome: Progressing  Goal: Verbalize understanding of personal risk factors for fall in the hospital  Outcome: Progressing  Goal: Verbalize understanding of risk factor reduction measures to prevent injury from fall in the home  Outcome: Progressing  Goal: Use assistive devices by end of the shift  Outcome: Progressing  Goal: Pace activities to prevent fatigue by end of the shift  Outcome: Progressing     Problem: Pain  Goal: Takes deep breaths with improved pain control throughout the shift  Outcome: Progressing  Goal: Turns in bed with improved pain control throughout the shift  Outcome: Progressing  Goal: Walks with improved pain control throughout the shift  Outcome: Progressing  Goal: Performs ADL's with improved pain control throughout shift  Outcome: Progressing  Goal: Participates in PT with improved pain control throughout the shift  Outcome: Progressing  Goal: Free from opioid side effects throughout the shift  Outcome: Progressing  Goal: Free from acute confusion related to pain meds throughout the shift  Outcome: Progressing   The patient's goals for the shift include      The clinical goals for the shift include rest

## 2023-12-15 NOTE — CARE PLAN
The patient's goals for the shift include      The clinical goals for the shift include rest      Problem: Fall/Injury  Goal: Not fall by end of shift  Outcome: Progressing  Goal: Be free from injury by end of the shift  Outcome: Progressing  Goal: Verbalize understanding of personal risk factors for fall in the hospital  Outcome: Progressing  Goal: Verbalize understanding of risk factor reduction measures to prevent injury from fall in the home  Outcome: Progressing  Goal: Use assistive devices by end of the shift  Outcome: Progressing  Goal: Pace activities to prevent fatigue by end of the shift  Outcome: Progressing     Problem: Pain  Goal: Takes deep breaths with improved pain control throughout the shift  Outcome: Progressing  Goal: Turns in bed with improved pain control throughout the shift  Outcome: Progressing  Goal: Walks with improved pain control throughout the shift  Outcome: Progressing  Goal: Performs ADL's with improved pain control throughout shift  Outcome: Progressing  Goal: Participates in PT with improved pain control throughout the shift  Outcome: Progressing  Goal: Free from opioid side effects throughout the shift  Outcome: Progressing  Goal: Free from acute confusion related to pain meds throughout the shift  Outcome: Progressing

## 2023-12-15 NOTE — PROGRESS NOTES
Nephrology Consult Progress Note    Dennis Galo is a 83 y.o. male on day 2 of admission presenting with LAURA (acute kidney injury) (CMS/HCC).      Subjective   No acute events overnight, improved/resolved hematuria, tolerating po, very poor historian       Objective          Physical Exam    Vitals 24HR  Heart Rate:  [55-66]   Temp:  [36.6 °C (97.9 °F)-37.1 °C (98.8 °F)]   Resp:  [16-18]   BP: ()/(53-64)   SpO2:  [93 %-96 %]          GENERAL: Well developed and in no apparent distress. Alert and cooperative.  HEENT: Normocephalic and atraumatic.  Clear sclera.  CARDIOVASCULAR: Regular rate and rhythm.  No murmurs, rubs, or gallops. S1/S2.  RESPIRATORY: Clear to auscultation b/l. No wheezes, rales or rhonchi. Normal effort.   ABDOMEN: Soft, non-tender. Not distended. No rebound or guarding.  MUSCULOSKELETAL: Grossly normal inspection.  No joint swelling or obvious deformities.  Range of motion intact  EXTREMITIES: No peripheral edema. Stasis skin changes   NEUROLOGICAL: A&O X 3. CN II-XII are grossly intact. No focal deficits.   SKIN: chronic skin changes, wrapped  PSYCH: Appropriate mood and affect.              I&O 24HR    Intake/Output Summary (Last 24 hours) at 12/15/2023 1116  Last data filed at 12/15/2023 0900  Gross per 24 hour   Intake 1360 ml   Output 1850 ml   Net -490 ml         Medications  Medications Prior to Admission   Medication Sig Dispense Refill Last Dose    ammonium lactate (Amlactin) 12 % cream Apply 1 Application topically if needed for dry skin.   Unknown    aspirin 81 mg chewable tablet Chew 1 tablet (81 mg) once daily.   Unknown    atorvastatin (Lipitor) 80 mg tablet Take 1 tablet (80 mg) by mouth once daily.   Unknown    gabapentin (Neurontin) 100 mg capsule Take 1 capsule (100 mg) by mouth 3 times a day.   Unknown    isosorbide mononitrate ER (Imdur) 30 mg 24 hr tablet Take 1 tablet (30 mg) by mouth once daily.   Unknown    leuprolide acetate (LEUPROLIDE, BULK, MISC) 5 mg.  "Lupron 5 MG   Unknown    lisinopril 20 mg tablet TAKE 1 TABLET BY MOUTH EVERY DAY 90 tablet 3 Unknown    metoprolol tartrate (Lopressor) 100 mg tablet Take 0.5 mg by mouth 2 times a day.   Unknown    oxyCODONE (Roxicodone) 5 mg immediate release tablet Take 1 tablet (5 mg) by mouth every 6 hours if needed for severe pain (7 - 10). 10 tablet 0 Unknown    pen needle, diabetic 32 gauge x 5/32\" needle Use one daily 100 each 2     sitaGLIPtin phosphate (Januvia) 100 mg tablet Take 1 tablet (100 mg) by mouth once daily. 30 tablet 0 Unknown    torsemide 40 mg tablet Take 40 mg by mouth once daily. Do not start before December 13, 2023. 60 tablet 0 Unknown      Scheduled medications  aspirin, 81 mg, oral, Daily  atorvastatin, 80 mg, oral, Daily  gabapentin, 100 mg, oral, TID  heparin (porcine), 5,000 Units, subcutaneous, q8h  insulin lispro, 0-10 Units, subcutaneous, TID with meals  isosorbide mononitrate ER, 30 mg, oral, Daily  metoprolol tartrate, 25 mg, oral, BID  tamsulosin, 0.4 mg, oral, Daily      Continuous medications     PRN medications  PRN medications: acetaminophen, dextrose 10 % in water (D10W), dextrose, glucagon, oxyCODONE-acetaminophen, oxyCODONE-acetaminophen    Relevant Results      Admission on 12/13/2023   Component Date Value Ref Range Status    WBC 12/13/2023 11.9 (H)  4.4 - 11.3 x10*3/uL Final    nRBC 12/13/2023 0.0  0.0 - 0.0 /100 WBCs Final    RBC 12/13/2023 4.03 (L)  4.50 - 5.90 x10*6/uL Final    Hemoglobin 12/13/2023 12.5 (L)  13.5 - 17.5 g/dL Final    Hematocrit 12/13/2023 37.5 (L)  41.0 - 52.0 % Final    MCV 12/13/2023 93  80 - 100 fL Final    MCH 12/13/2023 31.0  26.0 - 34.0 pg Final    MCHC 12/13/2023 33.3  32.0 - 36.0 g/dL Final    RDW 12/13/2023 12.9  11.5 - 14.5 % Final    Platelets 12/13/2023 216  150 - 450 x10*3/uL Final    Neutrophils % 12/13/2023 80.4  40.0 - 80.0 % Final    Immature Granulocytes %, Automated 12/13/2023 0.6  0.0 - 0.9 % Final    Immature Granulocyte Count (IG) includes " promyelocytes, myelocytes and metamyelocytes but does not include bands. Percent differential counts (%) should be interpreted in the context of the absolute cell counts (cells/UL).    Lymphocytes % 12/13/2023 6.9  13.0 - 44.0 % Final    Monocytes % 12/13/2023 9.3  2.0 - 10.0 % Final    Eosinophils % 12/13/2023 2.4  0.0 - 6.0 % Final    Basophils % 12/13/2023 0.4  0.0 - 2.0 % Final    Neutrophils Absolute 12/13/2023 9.57 (H)  1.60 - 5.50 x10*3/uL Final    Percent differential counts (%) should be interpreted in the context of the absolute cell counts (cells/uL).    Immature Granulocytes Absolute, Au* 12/13/2023 0.07  0.00 - 0.50 x10*3/uL Final    Lymphocytes Absolute 12/13/2023 0.82  0.80 - 3.00 x10*3/uL Final    Monocytes Absolute 12/13/2023 1.11 (H)  0.05 - 0.80 x10*3/uL Final    Eosinophils Absolute 12/13/2023 0.29  0.00 - 0.40 x10*3/uL Final    Basophils Absolute 12/13/2023 0.05  0.00 - 0.10 x10*3/uL Final    Magnesium 12/13/2023 2.18  1.60 - 2.40 mg/dL Final    MARKED HEMOLYSIS DETECTED. The result may be falsely elevated due to hemolysis or other interferents. Clinical correlation is recommended. Repeat testing may be considered.    Glucose 12/13/2023 166 (H)  74 - 99 mg/dL Final    Sodium 12/13/2023 126 (L)  136 - 145 mmol/L Final    Potassium 12/13/2023 6.9 (HH)  3.5 - 5.3 mmol/L Final    MARKED HEMOLYSIS DETECTED. The result may be falsely elevated due to hemolysis or other interferents. Clinical correlation is recommended. Repeat testing may be considered.    Chloride 12/13/2023 92 (L)  98 - 107 mmol/L Final    Bicarbonate 12/13/2023 26  21 - 32 mmol/L Final    Anion Gap 12/13/2023 15  10 - 20 mmol/L Final    Urea Nitrogen 12/13/2023 43 (H)  6 - 23 mg/dL Final    Creatinine 12/13/2023 2.89 (H)  0.50 - 1.30 mg/dL Final    eGFR 12/13/2023 21 (L)  >60 mL/min/1.73m*2 Final    Calculations of estimated GFR are performed using the 2021 CKD-EPI Study Refit equation without the race variable for the IDMS-Traceable  creatinine methods.  https://jasn.asnjournals.org/content/early/2021/09/22/ASN.1161419494    Calcium 12/13/2023 8.4 (L)  8.6 - 10.3 mg/dL Final    Albumin 12/13/2023 3.5  3.4 - 5.0 g/dL Final    MARKED HEMOLYSIS DETECTED. The result may be falsely elevated due to hemolysis or other interferents. Clinical correlation is recommended. Repeat testing may be considered.    Alkaline Phosphatase 12/13/2023 66  33 - 136 U/L Final    MARKED HEMOLYSIS DETECTED. The result may be falsely decreased due to hemolysis or other interferents. Clinical correlation is recommended. Repeat testing may be considered.    Total Protein 12/13/2023 6.7  6.4 - 8.2 g/dL Final    AST 12/13/2023 31  9 - 39 U/L Final    MARKED HEMOLYSIS DETECTED. The result may be falsely elevated due to hemolysis or other interferents. Clinical correlation is recommended. Repeat testing may be considered.    Bilirubin, Total 12/13/2023 0.9  0.0 - 1.2 mg/dL Final    ALT 12/13/2023 8 (L)  10 - 52 U/L Final    Patients treated with Sulfasalazine may generate falsely decreased results for ALT.    Creatine Kinase 12/13/2023 103  0 - 325 U/L Final    Color, Urine 12/13/2023 Yellow  Straw, Yellow Final    Appearance, Urine 12/13/2023 Hazy (N)  Clear Final    Specific Gravity, Urine 12/13/2023 1.011  1.005 - 1.035 Final    pH, Urine 12/13/2023 5.0  5.0, 5.5, 6.0, 6.5, 7.0, 7.5, 8.0 Final    Protein, Urine 12/13/2023 100 (2+) (N)  NEGATIVE mg/dL Final    Glucose, Urine 12/13/2023 NEGATIVE  NEGATIVE mg/dL Final    Blood, Urine 12/13/2023 LARGE (3+) (A)  NEGATIVE Final    Ketones, Urine 12/13/2023 NEGATIVE  NEGATIVE mg/dL Final    Bilirubin, Urine 12/13/2023 NEGATIVE  NEGATIVE Final    Urobilinogen, Urine 12/13/2023 <2.0  <2.0 mg/dL Final    Nitrite, Urine 12/13/2023 NEGATIVE  NEGATIVE Final    Leukocyte Esterase, Urine 12/13/2023 MODERATE (2+) (A)  NEGATIVE Final    Extra Tube 12/13/2023 Hold for add-ons.   Final    Auto resulted.    Potassium 12/13/2023 4.4  3.5 - 5.3  mmol/L Final    WBC, Urine 12/13/2023 >50 (A)  1-5, NONE /HPF Final    WBC Clumps, Urine 12/13/2023 MANY  Reference range not established. /HPF Final    RBC, Urine 12/13/2023 >20 (A)  NONE, 1-2, 3-5 /HPF Final    Squamous Epithelial Cells, Urine 12/13/2023 1-9 (SPARSE)  Reference range not established. /HPF Final    Bacteria, Urine 12/13/2023 1+ (A)  NONE SEEN /HPF Final    Mucus, Urine 12/13/2023 2+  Reference range not established. /LPF Final    Hyaline Casts, Urine 12/13/2023 3+ (A)  NONE /LPF Final    Amorphous Crystals, Urine 12/13/2023 2+  NONE, 1+, 2+ /HPF Final    Urine Culture 12/13/2023 20,000 - 80,000 Enterococcus faecalis (A)   Preliminary    Glucose 12/14/2023 189 (H)  74 - 99 mg/dL Final    Sodium 12/14/2023 131 (L)  136 - 145 mmol/L Final    Potassium 12/14/2023 4.1  3.5 - 5.3 mmol/L Final    Chloride 12/14/2023 96 (L)  98 - 107 mmol/L Final    Bicarbonate 12/14/2023 26  21 - 32 mmol/L Final    Anion Gap 12/14/2023 13  10 - 20 mmol/L Final    Urea Nitrogen 12/14/2023 43 (H)  6 - 23 mg/dL Final    Creatinine 12/14/2023 2.20 (H)  0.50 - 1.30 mg/dL Final    eGFR 12/14/2023 29 (L)  >60 mL/min/1.73m*2 Final    Calculations of estimated GFR are performed using the 2021 CKD-EPI Study Refit equation without the race variable for the IDMS-Traceable creatinine methods.  https://jasn.asnjournals.org/content/early/2021/09/22/ASN.9671740217    Calcium 12/14/2023 8.4 (L)  8.6 - 10.3 mg/dL Final    Sodium, Urine Random 12/14/2023 18  mmol/L Final    Creatinine, Urine Random 12/14/2023 78.5  20.0 - 370.0 mg/dL Final    Sodium/Creatinine Ratio 12/14/2023 23  Not established. mmol/g Creat Final    Osmolality, Urine Random 12/14/2023 253  200 - 1,200 mOsm/kg Final    Osmolality, Serum 12/14/2023 291  280 - 300 mOsm/kg Final    POCT Glucose 12/14/2023 213 (H)  74 - 99 mg/dL Final    WBC 12/15/2023 7.5  4.4 - 11.3 x10*3/uL Final    nRBC 12/15/2023 0.0  0.0 - 0.0 /100 WBCs Final    RBC 12/15/2023 3.48 (L)  4.50 - 5.90  x10*6/uL Final    Hemoglobin 12/15/2023 10.6 (L)  13.5 - 17.5 g/dL Final    Hematocrit 12/15/2023 32.2 (L)  41.0 - 52.0 % Final    MCV 12/15/2023 93  80 - 100 fL Final    MCH 12/15/2023 30.5  26.0 - 34.0 pg Final    MCHC 12/15/2023 32.9  32.0 - 36.0 g/dL Final    RDW 12/15/2023 12.8  11.5 - 14.5 % Final    Platelets 12/15/2023 208  150 - 450 x10*3/uL Final    Glucose 12/15/2023 134 (H)  74 - 99 mg/dL Final    Sodium 12/15/2023 133 (L)  136 - 145 mmol/L Final    Potassium 12/15/2023 3.9  3.5 - 5.3 mmol/L Final    Chloride 12/15/2023 99  98 - 107 mmol/L Final    Bicarbonate 12/15/2023 27  21 - 32 mmol/L Final    Anion Gap 12/15/2023 11  10 - 20 mmol/L Final    Urea Nitrogen 12/15/2023 38 (H)  6 - 23 mg/dL Final    Creatinine 12/15/2023 1.82 (H)  0.50 - 1.30 mg/dL Final    eGFR 12/15/2023 37 (L)  >60 mL/min/1.73m*2 Final    Calculations of estimated GFR are performed using the 2021 CKD-EPI Study Refit equation without the race variable for the IDMS-Traceable creatinine methods.  https://jasn.asnjournals.org/content/early/2021/09/22/ASN.2191107137    Calcium 12/15/2023 7.8 (L)  8.6 - 10.3 mg/dL Final    POCT Glucose 12/14/2023 154 (H)  74 - 99 mg/dL Final    POCT Glucose 12/15/2023 126 (H)  74 - 99 mg/dL Final      US renal complete    Result Date: 12/14/2023  Interpreted By:  Amy Eric, STUDY: US RENAL COMPLETE;  12/14/2023 1:53 pm   INDICATION: Signs/Symptoms:LAURA, concern for obstruction.   COMPARISON: 02/07/2023   ACCESSION NUMBER(S): [id number]   ORDERING CLINICIAN: SYLVIA MAN   TECHNIQUE: Multiple images of the kidneys were obtained.   FINDINGS: RIGHT KIDNEY: The right kidney is  normal in size measuring  13.2 cm in length. On 02/07/2023, the right kidney measured 12.6 cm in length.   The echogenicity of the cortex of the right kidney is  within normal limits. There is a 3.3 cm simple cortical cyst in the upper pole of the right kidney. There is a 3.6 cm simple cortical cyst in the lower pole of the right  kidney. There is no intrarenal calculus or hydronephrosis.   LEFT KIDNEY: The left kidney is  normal in size measuring  13.4 cm in length. On 02/07/2023, the left kidney measured 11.1 cm in length   The echogenicity of the cortex of the left kidney is  within normal limits. There is a 5.5 cm simple upper pole left renal cortical cysts. There is a 2.8 cm simple lower pole left renal cortical cyst. There is no intrarenal calculus or hydronephrosis.   BLADDER: There is circumferential thickening of bladder wall with trabeculation.       1. Bilateral renal cysts; no hydronephrosis. 2. Bladder wall thickening and trabeculation.   MACRO: None   Signed by: Amy Eric 12/14/2023 2:00 PM Dictation workstation:   [id number]    Transthoracic Echo (TTE) Complete    Result Date: 12/11/2023    [facility], [address] [place] [address] [id number] [phone] and                                 Fax [phone] TRANSTHORACIC ECHOCARDIOGRAM REPORT  Patient Name:      BEVERLY Petersen Physician:    [id number] Ayush Kaur MD Study Date:        12/11/2023          Ordering Provider:    [id number] HAYLIE URIBE MRN/PID:           [id number]            Fellow: Accession#:        [id number]        Nurse: Date of Birth/Age: 1940 / 83 years Sonographer:          ELROY Parson RDCS Gender:            M                   Additional Staff: Height:            177.80 cm           Admit Date:           12/9/2023 Weight:            103.87 kg           Admission Status:     Observation -                                                              Priority discharge BSA:               2.21 m2             Encounter#:           [id number]                                        Department Location:  [facility] Blood Pressure: 139 /65 mmHg Study Type:    TRANSTHORACIC ECHO (TTE) COMPLETE Diagnosis/ICD:  Chronic systolic (congestive) heart failure (CHF)-I50.22 Indication:    Congestive Heart Failure CPT Code:      Echo Complete w Full [id number] Patient History: Pertinent History: CAD and CHF. DM. Study Detail: The following Echo studies were performed: 2D, M-Mode, Doppler and               color flow. Technically challenging study due to body habitus and               prominent lung artifact.  PHYSICIAN INTERPRETATION: Left Ventricle: The left ventricular systolic function is mildly to moderately decreased, with an estimated ejection fraction of 40-45%. Wall motion is abnormal. The left ventricular cavity size is normal. Left ventricular diastolic filling was indeterminate. There is evidence of a small to moderate apical myocardial infarction. Left Atrium: The left atrium is mildly dilated. Right Ventricle: The right ventricle is normal in size. There is normal right ventricular global systolic function. Right Atrium: The right atrium is moderately to severely dilated. Aortic Valve: The aortic valve is trileaflet. There is no evidence of aortic valve regurgitation. The peak instantaneous gradient of the aortic valve is 3.6 mmHg. The mean gradient of the aortic valve is 2.0 mmHg. Mitral Valve: The mitral valve is mild to moderately thickened. There is mild to moderate mitral valve regurgitation. Tricuspid Valve: The tricuspid valve is structurally normal. There is trace to mild tricuspid regurgitation. The right ventricular systolic pressure is unable to be estimated. Pulmonic Valve: The pulmonic valve is structurally normal. There is trace pulmonic valve regurgitation. Pericardium: There is no pericardial effusion noted. Aorta: The aortic root is normal. There is no dilatation of the ascending aorta. There is no dilatation of the aortic root. Systemic Veins: The inferior vena cava appears mildly dilated.  CONCLUSIONS:  1. Left ventricular systolic function is mildly to moderately decreased with a 40-45%  estimated ejection fraction.  2. There is a small to moderate apical myocardial infarction.  3. The right atrium is moderately to severely dilated.  4. Mild to moderate mitral valve regurgitation. QUANTITATIVE DATA SUMMARY: 2D MEASUREMENTS:                          Normal Ranges: Ao Root d:     3.40 cm   (2.0-3.7cm) LAs:           4.30 cm   (2.7-4.0cm) IVSd:          1.14 cm   (0.6-1.1cm) LVPWd:         1.16 cm   (0.6-1.1cm) LVIDd:         4.87 cm   (3.9-5.9cm) LVIDs:         3.07 cm LV Mass Index: 95.5 g/m2 LV % FS        37.0 % LA VOLUME:                               Normal Ranges: LA Vol A4C:        36.9 ml    (22+/-6mL/m2) LA Vol A2C:        61.8 ml LA Vol BP:         49.5 ml LA Vol Index A4C:  16.7ml/m2 LA Vol Index A2C:  28.0 ml/m2 LA Vol Index BP:   22.4 ml/m2 LA Area A4C:       16.0 cm2 LA Area A2C:       20.0 cm2 LA Major Axis A4C: 5.9 cm LA Major Axis A2C: 5.5 cm LA Volume Index:   21.0 ml/m2 RA VOLUME BY A/L METHOD:                       Normal Ranges: RA Area A4C: 17.0 cm2 M-MODE MEASUREMENTS:                  Normal Ranges: Ao Root: 4.10 cm (2.0-3.7cm) LAs:     4.90 cm (2.7-4.0cm) AORTA MEASUREMENTS:                    Normal Ranges: Asc Ao, d: 3.40 cm (2.1-3.4cm) LV SYSTOLIC FUNCTION BY 2D PLANIMETRY (MOD):                     Normal Ranges: EF-A4C View: 45.4 % (>=55%) EF-A2C View: 32.6 % EF-Biplane:  39.8 % LV DIASTOLIC FUNCTION:                        Normal Ranges: MV Peak E:    0.76 m/s (0.7-1.2 m/s) MV Peak A:    0.44 m/s (0.42-0.7 m/s) E/A Ratio:    1.73     (1.0-2.2) MV lateral e' 0.08 m/s MV medial e'  0.08 m/s MITRAL VALVE:                 Normal Ranges: MV DT: 191 msec (150-240msec) AORTIC VALVE:                                   Normal Ranges: AoV Vmax:                0.96 m/s (<=1.7m/s) AoV Peak PG:             3.6 mmHg (<20mmHg) AoV Mean P.0 mmHg (1.7-11.5mmHg) LVOT Max Pal:            0.85 m/s (<=1.1m/s) AoV VTI:                 19.70 cm (18-25cm) LVOT VTI:                 19.80 cm LVOT Diameter:           2.30 cm  (1.8-2.4cm) AoV Area, VTI:           4.18 cm2 (2.5-5.5cm2) AoV Area,Vmax:           3.72 cm2 (2.5-4.5cm2) AoV Dimensionless Index: 1.01  RIGHT VENTRICLE: RV Basal 3.81 cm TAPSE:   23.9 mm RV s'    0.13 m/s TRICUSPID VALVE/RVSP:                   Normal Ranges: IVC Diam: 2.20 cm PULMONIC VALVE:                      Normal Ranges: PV Max Pal: 0.9 m/s  (0.6-0.9m/s) PV Max PG:  3.0 mmHg  [id number] Ayush Kaur MD Electronically signed on 12/11/2023 at 11:27:48 AM  ** Final **     CT chest high resolution    Result Date: 12/10/2023  Interpreted By:  Romario Nguyen, STUDY: CT CHEST HIGH RESOLUTION;  12/10/2023 10:08 am   INDICATION: Signs/Symptoms:prostate CA rule out any mets.   COMPARISON: 08/26/2022   ACCESSION NUMBER(S): [id number]   ORDERING CLINICIAN: HAYLIE URIBE   TECHNIQUE: Helical data acquisition of the chest was obtained  without IV contrast material.  Images were reformatted in axial, coronal, and sagittal planes.   FINDINGS: LUNGS and AIRWAYS: Overall there has been increase in multiple pulmonary nodules. Index nodules include a 4 mm nodule, and 9 mm ground-glass nodule in the right lung on image 49 of series 605, with the ground-glass nodule previously measuring 1 cm and denser. A 5 mm nodule on image 268 previously measured approximately 7 mm. In the left lung a 6 mm ground-glass nodule on image 154 previously measured 8 mm, and a 4 mm nodule on image 140 measuring approximately 6 mm. There has also been resolution of several nodules. Opacities at the lung bases posteriorly is probably due to compressive atelectasis.   MEDIASTINUM and LU, LOWER NECK AND AXILLA: The visualized thyroid gland is within normal limits.   No evidence of thoracic lymphadenopathy by CT criteria.   HEART and VESSELS: The thoracic aorta is of normal course and caliber without significant atherosclerotic calcification .   Main pulmonary artery and its branches are normal in caliber.   There is  radiodensity along the courses of the coronary arteries indicating extensive atherosclerotic calcification and/or stents.   The cardiac chambers are not enlarged.   UPPER ABDOMEN: The visualized subdiaphragmatic structures demonstrate no remarkable findings.   CHEST WALL, OSSEOUS STRUCTURES AND OTHER FINDINGS: There are no suspicious osseous lesions.       1.  Improvement of pulmonary nodules presumably from metastatic disease.   Signed by: Romario Nguyen 12/10/2023 1:24 PM Dictation workstation:   [id number]    CT lumbar spine wo IV contrast    Result Date: 12/9/2023  Interpreted By:  Nehemias Pantoja, STUDY: CT LUMBAR SPINE WO IV CONTRAST;  12/9/2023 6:31 pm   INDICATION: Signs/Symptoms:back and buttock pain.   COMPARISON: None.   ACCESSION NUMBER(S): [id number]   ORDERING CLINICIAN: HAYLIE URIBE   TECHNIQUE: Axial noncontrast images of the lumbar spine with coronal and sagittal reconstructed images.   FINDINGS: ALIGNMENT: No traumatic spondylolisthesis or traumatic facet widening.   VERTEBRAE: No acute fracture. Vertebral body heights are maintained.   SPINAL CANAL/INTERVERTEBRAL DISCS: Multilevel disc space height loss most pronounced at L4-L5 with multilevel posterior disc bulge. Ligamentum flavum hypertrophy with variable degrees of spinal canal stenosis noted throughout the lumbar spine moderate-to-severe at L2-L3 and L4-L5.   PARASPINAL SOFT TISSUES: No significant abnormality.   VISUALIZED ABDOMEN: The urinary bladder is decompressed with a Deal catheter. Bladder wall thickening. Correlate for evidence of cystitis.       No acute fracture or traumatic malalignment of the lumbar spine.   Multilevel degenerative changes throughout the lumbar spine with moderate-to-severe spinal canal stenosis at L2-L3 and L4-L5.   Signed by: Nehemias Pantoja 12/9/2023 6:43 PM Dictation workstation:   [id number]    CT abdomen pelvis wo IV contrast    Result Date: 12/9/2023  Interpreted By:  Minh Pedraza, STUDY: CT ABDOMEN  PELVIS WO IV CONTRAST;  12/9/2023 4:07 pm   INDICATION: Signs/Symptoms:hematuria, pain, h/o cancer.   COMPARISON: CT abdomen and pelvis 07/27/2022   ACCESSION NUMBER(S): [id number]   ORDERING CLINICIAN: ARIEL RAZA   TECHNIQUE: CT of the abdomen and pelvis was performed. Contiguous axial images were obtained at 3 mm slice thickness through the abdomen and pelvis. Coronal and sagittal reconstructions at 3 mm slice thickness were performed.  No intravenous contrast was administered.   FINDINGS: Please note that the evaluation of vessels, lymph nodes and organs is limited without intravenous contrast.   LOWER CHEST: Bibasilar atelectasis.   ABDOMEN:   LIVER: The liver demonstrates homogeneous attenuation without evidence of focal liver lesions.   BILE DUCTS: The intrahepatic and extrahepatic ducts are not dilated.   GALLBLADDER: The gallbladder is nondistended and without evidence of radiopaque stones.   PANCREAS: The pancreas appears unremarkable without evidence of ductal dilatation or masses.   SPLEEN: The spleen is normal in size without focal lesions.   ADRENAL GLANDS: Bilateral adrenal glands appear normal.   KIDNEYS AND URETERS: The kidneys are normal in size and unremarkable in appearance. Multiple bilateral simple cysts, the largest in the left interpolar region measures 5.5 cm. No hydroureteronephrosis or nephroureterolithiasis is identified.   PELVIS:   BLADDER: Decompressed urinary bladder Deal catheter in-situ.   REPRODUCTIVE ORGANS: No pelvic masses.   BOWEL: Small hiatal hernia. Stomach is otherwise unremarkable. No bowel dilation or wall thickening. Moderate colonic stool. The appendix is not definitely visualized. There is however no pericecal stranding or fluid.   VESSELS: Moderate atherosclerotic changes are noted to the aorta and branching vessels. There is no aneurysmal dilatation.   PERITONEUM/RETROPERITONEUM/LYMPH NODES: There is no free or loculated fluid collection, no free  intraperitoneal air. The retroperitoneum appears normal.  No abdominopelvic lymphadenopathy is present.   ABDOMINAL WALL: The abdominal wall soft tissues appear normal.   BONES: No suspicious osseous lesions are identified. Degenerative discogenic disease is noted in the lower thoracic and lumbar spine.       No acute findings in the abdomen and pelvis. Moderate colonic stool.   Signed by: Minh Pedraza 12/9/2023 4:27 PM Dictation workstation:   [id number]    ECHO 12/9/23  1. Left ventricular systolic function is mildly to moderately decreased with a 40-45% estimated ejection fraction.   2. There is a small to moderate apical myocardial infarction.   3. The right atrium is moderately to severely dilated.   4. Mild to moderate mitral valve regurgitation.    ASSESSMENT AND PLAN  83 y.o. male with PMH of CKD, prostate ca, DM, CHF, HL, obesity, CAD s/p CABG admitted day after he was discharged with hematuria, found to have severe LAURA and readmitted    LAURA on CKD stage 3, baseline creatinine 1-1.5 was 1.7 on discharge and up to 2.89 on readmission, improving  Volume overload, improved  Hyponatremia/HK, renal disease, improved with ivf  BL renal cysts  Hematuria, underlying metastatic prostate ca     Plan  - ivf today and resume lower dose torsemide 40 mg daily tomorrow   - hNa/HK and creaitnine improved with ivf only  - avoid hypotension and nephrotoxins  - continue lopressor BID only, hold lisinopril as BP stable  - HH has been stable  - maintain liu and fup with urology after the dc  - intermittent and now resolved hematuria, oncology input noted during last admission        MARS reviewed, dose for GFR<30   Thank you for the opportunity to assist in the care of this patient, please call with questions  Nichole Fowler MD PhD

## 2023-12-15 NOTE — PROGRESS NOTES
"Subjective  Pt denies pain, nausea and SOB. He says he's \"wheezing\" today, says he's had this in the past, is not on nasal sprays, denies hx of allergies.     Objective    Vitals:    12/15/23 0730   BP: 133/63   Pulse: 60   Resp: 17   Temp: 36.7 °C (98.1 °F)   SpO2: 93%       Vitals:    12/13/23 2111   Weight: 104 kg (230 lb)       Scheduled medications    Continuous medications    PRN medications      Results for orders placed or performed during the hospital encounter of 12/13/23 (from the past 24 hour(s))   Basic metabolic panel   Result Value Ref Range    Glucose 189 (H) 74 - 99 mg/dL    Sodium 131 (L) 136 - 145 mmol/L    Potassium 4.1 3.5 - 5.3 mmol/L    Chloride 96 (L) 98 - 107 mmol/L    Bicarbonate 26 21 - 32 mmol/L    Anion Gap 13 10 - 20 mmol/L    Urea Nitrogen 43 (H) 6 - 23 mg/dL    Creatinine 2.20 (H) 0.50 - 1.30 mg/dL    eGFR 29 (L) >60 mL/min/1.73m*2    Calcium 8.4 (L) 8.6 - 10.3 mg/dL   Sodium, Urine Random   Result Value Ref Range    Sodium, Urine Random 18 mmol/L    Creatinine, Urine Random 78.5 20.0 - 370.0 mg/dL    Sodium/Creatinine Ratio 23 Not established. mmol/g Creat   Osmolality, urine   Result Value Ref Range    Osmolality, Urine Random 253 200 - 1,200 mOsm/kg   Osmolality   Result Value Ref Range    Osmolality, Serum 291 280 - 300 mOsm/kg   POCT GLUCOSE   Result Value Ref Range    POCT Glucose 213 (H) 74 - 99 mg/dL   POCT GLUCOSE   Result Value Ref Range    POCT Glucose 154 (H) 74 - 99 mg/dL   CBC   Result Value Ref Range    WBC 7.5 4.4 - 11.3 x10*3/uL    nRBC 0.0 0.0 - 0.0 /100 WBCs    RBC 3.48 (L) 4.50 - 5.90 x10*6/uL    Hemoglobin 10.6 (L) 13.5 - 17.5 g/dL    Hematocrit 32.2 (L) 41.0 - 52.0 %    MCV 93 80 - 100 fL    MCH 30.5 26.0 - 34.0 pg    MCHC 32.9 32.0 - 36.0 g/dL    RDW 12.8 11.5 - 14.5 %    Platelets 208 150 - 450 x10*3/uL   Basic metabolic panel   Result Value Ref Range    Glucose 134 (H) 74 - 99 mg/dL    Sodium 133 (L) 136 - 145 mmol/L    Potassium 3.9 3.5 - 5.3 mmol/L    " Chloride 99 98 - 107 mmol/L    Bicarbonate 27 21 - 32 mmol/L    Anion Gap 11 10 - 20 mmol/L    Urea Nitrogen 38 (H) 6 - 23 mg/dL    Creatinine 1.82 (H) 0.50 - 1.30 mg/dL    eGFR 37 (L) >60 mL/min/1.73m*2    Calcium 7.8 (L) 8.6 - 10.3 mg/dL   POCT GLUCOSE   Result Value Ref Range    POCT Glucose 126 (H) 74 - 99 mg/dL   POCT GLUCOSE   Result Value Ref Range    POCT Glucose 181 (H) 74 - 99 mg/dL       Constitutional: Well developed, awake, alert, oriented x3, no acute distress, cooperative   Eyes: EOMI, clear sclera   ENMT: mucous membranes moist, no lesions seen   Head/Neck: Neck supple, no apparent injury, head atraumatic   Respiratory/Thorax: CTAB, good chest expansion, respirations even and unlabored, mild expiratory upper airway wheezes   Cardiovascular: Regular rate and rhythm, no murmurs/rubs/gallops, normal S1 and S 2   Gastrointestinal: Abdomen nondistended, +BS, no bruits   Musculoskeletal: ROM intact, no joint swelling, normal  strength   Extremities: no cyanosis, edema, contusions or clubbing   Neurological: no focal deficit, pt alert and oriented x3   Psychological: Appropriate affect and behavior   Skin: Warm and dry, no lesions, no rashes  Genitourinary: Deal draining clear yellow urine       Past Medical History:   Diagnosis Date    CAD (coronary artery disease)     CHF (congestive heart failure) (CMS/[facility])     EF 40-45%    Chronic indwelling Deal catheter     Chronic renal disease, stage III (CMS/[facility])     Diabetes mellitus (CMS/[facility])     Eczema     Hiatal hernia     Hypertension     Infarction of spleen     Intracardiac thrombosis, not elsewhere classified     Apical mural thrombus    Old myocardial infarction     Prostate cancer (CMS/[facility])     Pulmonary nodules     Renal cyst     bilateral    TIA (transient ischemic attack)         Assessment/Plan  LAURA on CKD III     - creatinine continues to downtrend (baseline ~1.5), renal US nonacute     - nephro on board, resume lower dose torsemide 40 mg tomorrow,  lisinopril held  Hematuria  Hx prostate CA with urinary retention/chronic Deal     - pt admitted last week with hematuria, he follows w/Dr. Quintanilla, had stopped taking Xtandi      - catheter was exchanged, urine cx was negative, repeat is pending     - urology consulted, continue tamsulosin  Acute hyponatremia      - sodium 126>133 today after NS given, continued, sodium studies done  Type II DM      - endocrinology consulted last admission and pt's regimen was adjusted     - he will follow up outpatient  HTN     - BP controlled, continue BP meds as above  DVT Prophylaxis: subcutaneous heparin  Discharge disposition     - home when stable, likely tomorrow      Leana Kat, CNP  Hospital Medicine

## 2023-12-15 NOTE — PROGRESS NOTES
Spiritual Care Visit    Clinical Encounter Type  Visited With: Patient  Routine Visit: Introduction  Continue Visiting: Yes    Restorationist Encounters  Restorationist Needs: Spiritual care brochure, Prayer     welcomed by patient who was cogent and conversant.  Patient embraced spiritual care as evidenced by reciting familiar, ancient prayer with  before blessing.  Additional  visits advised.

## 2023-12-15 NOTE — CONSULTS
Inpatient consult to Urology  Consult performed by: Susan Spears, APRN-CNP  Consult ordered by: Torito Licea MD  Reason for consult: recurrent hematuria and retention          Reason For Consult  Recurrent hematuria and urinary retention    History Of Present Illness  Dennis Galo is a 83 y.o. male presenting with urinary retention x 12 hours with suprapubic pain. ED flushed catheter with brown muddy urine. Currently, catheter is draining clear/yellow urine. Pain is better.      Past Medical History  He has a past medical history of CAD (coronary artery disease), CHF (congestive heart failure) (CMS/[facility]), Chronic indwelling Deal catheter, Chronic renal disease, stage III (CMS/[facility]), Diabetes mellitus (CMS/[facility]), Eczema, Hiatal hernia, Hypertension, Infarction of spleen, Intracardiac thrombosis, not elsewhere classified, Old myocardial infarction, Prostate cancer (CMS/[facility]), Pulmonary nodules, Renal cyst, and TIA (transient ischemic attack).    Surgical History  He has a past surgical history that includes Pilonidal cyst drainage (07/26/2018); Cardiac catheterization (2013); Prostate surgery; and Coronary artery bypass graft.     Social History  He reports that he quit smoking about 15 years ago. His smoking use included cigarettes. He has never used smokeless tobacco. He reports that he does not drink alcohol and does not use drugs.    Family History  No family history on file.     Allergies  Terazosin    Review of Systems   All other systems reviewed and are negative.       Physical Exam  Constitutional:       Appearance: Normal appearance.   HENT:      Mouth/Throat:      Mouth: Mucous membranes are moist.   Cardiovascular:      Rate and Rhythm: Normal rate and regular rhythm.   Pulmonary:      Effort: Pulmonary effort is normal.      Breath sounds: Normal breath sounds.   Abdominal:      Palpations: Abdomen is soft.   Genitourinary:     Penis: Normal.       Testes: Normal.      Comments: Deal  "catheter in place with leg and clear/yellow urine  Skin:     General: Skin is warm and dry.   Neurological:      Mental Status: He is alert and oriented to person, place, and time. Mental status is at baseline.   Psychiatric:         Mood and Affect: Mood normal.         Behavior: Behavior normal.          Last Recorded Vitals  Blood pressure 133/63, pulse 60, temperature 36.7 °C (98.1 °F), temperature source Temporal, resp. rate 17, height 1.778 m (5' 10\"), weight 104 kg (230 lb), SpO2 93 %.    Relevant Results      Scheduled medications  aspirin, 81 mg, oral, Daily  atorvastatin, 80 mg, oral, Daily  gabapentin, 100 mg, oral, TID  heparin (porcine), 5,000 Units, subcutaneous, q8h  insulin lispro, 0-10 Units, subcutaneous, TID with meals  isosorbide mononitrate ER, 30 mg, oral, Daily  metoprolol tartrate, 25 mg, oral, BID  tamsulosin, 0.4 mg, oral, Daily  [START ON 12/16/2023] torsemide, 40 mg, oral, Daily      Continuous medications     PRN medications  PRN medications: acetaminophen, dextrose 10 % in water (D10W), dextrose, glucagon, oxyCODONE-acetaminophen, oxyCODONE-acetaminophen  Results for orders placed or performed during the hospital encounter of 12/13/23 (from the past 24 hour(s))   POCT GLUCOSE   Result Value Ref Range    POCT Glucose 154 (H) 74 - 99 mg/dL   CBC   Result Value Ref Range    WBC 7.5 4.4 - 11.3 x10*3/uL    nRBC 0.0 0.0 - 0.0 /100 WBCs    RBC 3.48 (L) 4.50 - 5.90 x10*6/uL    Hemoglobin 10.6 (L) 13.5 - 17.5 g/dL    Hematocrit 32.2 (L) 41.0 - 52.0 %    MCV 93 80 - 100 fL    MCH 30.5 26.0 - 34.0 pg    MCHC 32.9 32.0 - 36.0 g/dL    RDW 12.8 11.5 - 14.5 %    Platelets 208 150 - 450 x10*3/uL   Basic metabolic panel   Result Value Ref Range    Glucose 134 (H) 74 - 99 mg/dL    Sodium 133 (L) 136 - 145 mmol/L    Potassium 3.9 3.5 - 5.3 mmol/L    Chloride 99 98 - 107 mmol/L    Bicarbonate 27 21 - 32 mmol/L    Anion Gap 11 10 - 20 mmol/L    Urea Nitrogen 38 (H) 6 - 23 mg/dL    Creatinine 1.82 (H) 0.50 - " 1.30 mg/dL    eGFR 37 (L) >60 mL/min/1.73m*2    Calcium 7.8 (L) 8.6 - 10.3 mg/dL   POCT GLUCOSE   Result Value Ref Range    POCT Glucose 126 (H) 74 - 99 mg/dL   POCT GLUCOSE   Result Value Ref Range    POCT Glucose 181 (H) 74 - 99 mg/dL    US renal complete    Result Date: 12/14/2023  Interpreted By:  Amy Eric, STUDY: US RENAL COMPLETE;  12/14/2023 1:53 pm   INDICATION: Signs/Symptoms:LAURA, concern for obstruction.   COMPARISON: 02/07/2023   ACCESSION NUMBER(S): [id number]   ORDERING CLINICIAN: SYLVIA MAN   TECHNIQUE: Multiple images of the kidneys were obtained.   FINDINGS: RIGHT KIDNEY: The right kidney is  normal in size measuring  13.2 cm in length. On 02/07/2023, the right kidney measured 12.6 cm in length.   The echogenicity of the cortex of the right kidney is  within normal limits. There is a 3.3 cm simple cortical cyst in the upper pole of the right kidney. There is a 3.6 cm simple cortical cyst in the lower pole of the right kidney. There is no intrarenal calculus or hydronephrosis.   LEFT KIDNEY: The left kidney is  normal in size measuring  13.4 cm in length. On 02/07/2023, the left kidney measured 11.1 cm in length   The echogenicity of the cortex of the left kidney is  within normal limits. There is a 5.5 cm simple upper pole left renal cortical cysts. There is a 2.8 cm simple lower pole left renal cortical cyst. There is no intrarenal calculus or hydronephrosis.   BLADDER: There is circumferential thickening of bladder wall with trabeculation.       1. Bilateral renal cysts; no hydronephrosis. 2. Bladder wall thickening and trabeculation.   MACRO: None   Signed by: Amy Eric 12/14/2023 2:00 PM Dictation workstation:   [id number]       Assessment/Plan     82 yo male re-admitted for urinary retention and hematuria. LAURA on CKD III has progressed since previous admission. Possible ATN. UA + Leukocytes, culture pending. Renal US completed with no hydronephrosis, unchanged cysts and bladder  trabeculation. Hematuria has resolved and catheter is draining clear/yellow urine. Leukocytosis has resolved.     - Maintain liu catheter  - Treat UTI, Urine Cx + E. Faecalis, susceptibility in progress  - Continue Flomax 0.4mg   - Appreciate nephrology recommendations   - No acute urologic intervention at this time  - Patient to follow-up with Dr. Quintanilla as scheduled on 1/4    Plan discussed with Dr. Quintanilla    I spent 30 minutes in the professional and overall care of this patient.

## 2023-12-16 VITALS
WEIGHT: 230 LBS | TEMPERATURE: 99 F | DIASTOLIC BLOOD PRESSURE: 66 MMHG | HEIGHT: 70 IN | OXYGEN SATURATION: 90 % | SYSTOLIC BLOOD PRESSURE: 130 MMHG | HEART RATE: 72 BPM | RESPIRATION RATE: 19 BRPM | BODY MASS INDEX: 32.93 KG/M2

## 2023-12-16 LAB
ANION GAP SERPL CALC-SCNC: 10 MMOL/L (ref 10–20)
BACTERIA UR CULT: ABNORMAL
BUN SERPL-MCNC: 34 MG/DL (ref 6–23)
CALCIUM SERPL-MCNC: 8.2 MG/DL (ref 8.6–10.3)
CHLORIDE SERPL-SCNC: 99 MMOL/L (ref 98–107)
CO2 SERPL-SCNC: 28 MMOL/L (ref 21–32)
CREAT SERPL-MCNC: 1.61 MG/DL (ref 0.5–1.3)
ERYTHROCYTE [DISTWIDTH] IN BLOOD BY AUTOMATED COUNT: 12.8 % (ref 11.5–14.5)
GFR SERPL CREATININE-BSD FRML MDRD: 43 ML/MIN/1.73M*2
GLUCOSE BLD MANUAL STRIP-MCNC: 133 MG/DL (ref 74–99)
GLUCOSE BLD MANUAL STRIP-MCNC: 251 MG/DL (ref 74–99)
GLUCOSE SERPL-MCNC: 151 MG/DL (ref 74–99)
HCT VFR BLD AUTO: 34.6 % (ref 41–52)
HGB BLD-MCNC: 11.3 G/DL (ref 13.5–17.5)
MCH RBC QN AUTO: 30.5 PG (ref 26–34)
MCHC RBC AUTO-ENTMCNC: 32.7 G/DL (ref 32–36)
MCV RBC AUTO: 94 FL (ref 80–100)
NRBC BLD-RTO: 0 /100 WBCS (ref 0–0)
PLATELET # BLD AUTO: 226 X10*3/UL (ref 150–450)
POTASSIUM SERPL-SCNC: 4.4 MMOL/L (ref 3.5–5.3)
RBC # BLD AUTO: 3.7 X10*6/UL (ref 4.5–5.9)
SODIUM SERPL-SCNC: 133 MMOL/L (ref 136–145)
WBC # BLD AUTO: 7 X10*3/UL (ref 4.4–11.3)

## 2023-12-16 PROCEDURE — 36415 COLL VENOUS BLD VENIPUNCTURE: CPT | Performed by: NURSE PRACTITIONER

## 2023-12-16 PROCEDURE — 2500000001 HC RX 250 WO HCPCS SELF ADMINISTERED DRUGS (ALT 637 FOR MEDICARE OP): Performed by: INTERNAL MEDICINE

## 2023-12-16 PROCEDURE — 2500000004 HC RX 250 GENERAL PHARMACY W/ HCPCS (ALT 636 FOR OP/ED): Performed by: INTERNAL MEDICINE

## 2023-12-16 PROCEDURE — 2500000001 HC RX 250 WO HCPCS SELF ADMINISTERED DRUGS (ALT 637 FOR MEDICARE OP): Performed by: NURSE PRACTITIONER

## 2023-12-16 PROCEDURE — 99238 HOSP IP/OBS DSCHRG MGMT 30/<: CPT | Performed by: NURSE PRACTITIONER

## 2023-12-16 PROCEDURE — 96372 THER/PROPH/DIAG INJ SC/IM: CPT | Performed by: INTERNAL MEDICINE

## 2023-12-16 PROCEDURE — 80048 BASIC METABOLIC PNL TOTAL CA: CPT | Performed by: NURSE PRACTITIONER

## 2023-12-16 PROCEDURE — 97166 OT EVAL MOD COMPLEX 45 MIN: CPT | Mod: GO

## 2023-12-16 PROCEDURE — 85027 COMPLETE CBC AUTOMATED: CPT | Performed by: NURSE PRACTITIONER

## 2023-12-16 PROCEDURE — 82947 ASSAY GLUCOSE BLOOD QUANT: CPT

## 2023-12-16 RX ORDER — AMOXICILLIN 500 MG/1
500 CAPSULE ORAL EVERY 8 HOURS SCHEDULED
Qty: 12 CAPSULE | Refills: 0 | Status: SHIPPED | OUTPATIENT
Start: 2023-12-16 | End: 2023-12-20

## 2023-12-16 RX ORDER — METOPROLOL TARTRATE 25 MG/1
25 TABLET, FILM COATED ORAL 2 TIMES DAILY
Qty: 60 TABLET | Refills: 0 | Status: SHIPPED | OUTPATIENT
Start: 2023-12-16 | End: 2024-01-11 | Stop reason: SDUPTHER

## 2023-12-16 RX ORDER — TAMSULOSIN HYDROCHLORIDE 0.4 MG/1
0.4 CAPSULE ORAL DAILY
Qty: 30 CAPSULE | Refills: 0 | Status: SHIPPED | OUTPATIENT
Start: 2023-12-17 | End: 2024-03-12 | Stop reason: WASHOUT

## 2023-12-16 RX ADMIN — TAMSULOSIN HYDROCHLORIDE 0.4 MG: 0.4 CAPSULE ORAL at 08:30

## 2023-12-16 RX ADMIN — ISOSORBIDE MONONITRATE 30 MG: 30 TABLET, EXTENDED RELEASE ORAL at 08:30

## 2023-12-16 RX ADMIN — TORSEMIDE 40 MG: 20 TABLET ORAL at 08:30

## 2023-12-16 RX ADMIN — AMOXICILLIN 500 MG: 500 CAPSULE ORAL at 06:45

## 2023-12-16 RX ADMIN — ASPIRIN 81 MG CHEWABLE TABLET 81 MG: 81 TABLET CHEWABLE at 08:30

## 2023-12-16 RX ADMIN — HEPARIN SODIUM 5000 UNITS: 5000 INJECTION INTRAVENOUS; SUBCUTANEOUS at 04:34

## 2023-12-16 RX ADMIN — ATORVASTATIN CALCIUM 80 MG: 80 TABLET, FILM COATED ORAL at 08:30

## 2023-12-16 RX ADMIN — OXYCODONE HYDROCHLORIDE AND ACETAMINOPHEN 1 TABLET: 5; 325 TABLET ORAL at 12:59

## 2023-12-16 RX ADMIN — GABAPENTIN 100 MG: 100 CAPSULE ORAL at 08:30

## 2023-12-16 RX ADMIN — OXYCODONE HYDROCHLORIDE AND ACETAMINOPHEN 2 TABLET: 5; 325 TABLET ORAL at 04:34

## 2023-12-16 RX ADMIN — METOPROLOL TARTRATE 25 MG: 25 TABLET, FILM COATED ORAL at 08:30

## 2023-12-16 ASSESSMENT — COGNITIVE AND FUNCTIONAL STATUS - GENERAL
HELP NEEDED FOR BATHING: A LITTLE
DAILY ACTIVITIY SCORE: 20
MOBILITY SCORE: 20
DRESSING REGULAR UPPER BODY CLOTHING: A LITTLE
DRESSING REGULAR LOWER BODY CLOTHING: A LITTLE
CLIMB 3 TO 5 STEPS WITH RAILING: A LITTLE
DAILY ACTIVITIY SCORE: 21
WALKING IN HOSPITAL ROOM: A LITTLE
HELP NEEDED FOR BATHING: A LITTLE
MOVING TO AND FROM BED TO CHAIR: A LITTLE
PERSONAL GROOMING: A LITTLE
DRESSING REGULAR LOWER BODY CLOTHING: A LITTLE
DRESSING REGULAR UPPER BODY CLOTHING: A LITTLE
STANDING UP FROM CHAIR USING ARMS: A LITTLE

## 2023-12-16 ASSESSMENT — PAIN - FUNCTIONAL ASSESSMENT
PAIN_FUNCTIONAL_ASSESSMENT: 0-10
PAIN_FUNCTIONAL_ASSESSMENT: 0-10

## 2023-12-16 ASSESSMENT — ACTIVITIES OF DAILY LIVING (ADL)
LACK_OF_TRANSPORTATION: NO
ADL_ASSISTANCE: NEEDS ASSISTANCE
BATHING_ASSISTANCE: MINIMAL

## 2023-12-16 ASSESSMENT — PAIN DESCRIPTION - DESCRIPTORS: DESCRIPTORS: DISCOMFORT;SHARP

## 2023-12-16 ASSESSMENT — PAIN DESCRIPTION - LOCATION: LOCATION: SHOULDER

## 2023-12-16 ASSESSMENT — PAIN DESCRIPTION - ORIENTATION: ORIENTATION: LEFT

## 2023-12-16 ASSESSMENT — PAIN SCALES - GENERAL
PAINLEVEL_OUTOF10: 6
PAINLEVEL_OUTOF10: 3
PAINLEVEL_OUTOF10: 4

## 2023-12-16 NOTE — DISCHARGE SUMMARY
Hospital Course  Dennis Galo is an 83 y.o. male who presented with decreased urine output. He was discharged from hospital the day before after being treated for hematuria. He was seen by urology; his hematuria had resolved. Hospital course was complicated by worsening renal function in setting of CKD.  He was seen by nephrology and his diuretics were adjusted and his ACE inhibitor was discontinued.  He was evaluated again this admission by urology and nephrology. His hematuria resolved quickly, Flomax was continued. He has followup scheduled 1/4/23 with Dr. Quintanilla. He will continue a 5-day course of amoxicillin for E. faecalis UTI. A renal US was nonacute. Torsemide dose was reduced and lisinopril was stopped. His creatinine is much improved and very close to baseline. He will get a BMP in a week. His acute hyponatremia resolved quickly with IVNS and has stayed stable off of it. He is medically stable for discharge home with [facility].     Subjective  Pt sleeping and awakens after several tactile and verbal prompts. He then c/o 8/10 left shoulder pain. He said it's due to pulling himself up in bed. He sneers when told he has to request pain medication, asking me to confirm how often he can be medicated.    Objective    Vitals:    12/16/23 0728   BP: 130/66   Pulse: 72   Resp:    Temp: 37.2 °C (99 °F)   SpO2: 90%       Vitals:    12/13/23 2111   Weight: 104 kg (230 lb)       Results for orders placed or performed during the hospital encounter of 12/13/23 (from the past 24 hour(s))   POCT GLUCOSE   Result Value Ref Range    POCT Glucose 181 (H) 74 - 99 mg/dL   POCT GLUCOSE   Result Value Ref Range    POCT Glucose 229 (H) 74 - 99 mg/dL   POCT GLUCOSE   Result Value Ref Range    POCT Glucose 212 (H) 74 - 99 mg/dL   Basic Metabolic Panel   Result Value Ref Range    Glucose 151 (H) 74 - 99 mg/dL    Sodium 133 (L) 136 - 145 mmol/L    Potassium 4.4 3.5 - 5.3 mmol/L    Chloride 99 98 - 107 mmol/L    Bicarbonate 28 21 - 32  mmol/L    Anion Gap 10 10 - 20 mmol/L    Urea Nitrogen 34 (H) 6 - 23 mg/dL    Creatinine 1.61 (H) 0.50 - 1.30 mg/dL    eGFR 43 (L) >60 mL/min/1.73m*2    Calcium 8.2 (L) 8.6 - 10.3 mg/dL   CBC   Result Value Ref Range    WBC 7.0 4.4 - 11.3 x10*3/uL    nRBC 0.0 0.0 - 0.0 /100 WBCs    RBC 3.70 (L) 4.50 - 5.90 x10*6/uL    Hemoglobin 11.3 (L) 13.5 - 17.5 g/dL    Hematocrit 34.6 (L) 41.0 - 52.0 %    MCV 94 80 - 100 fL    MCH 30.5 26.0 - 34.0 pg    MCHC 32.7 32.0 - 36.0 g/dL    RDW 12.8 11.5 - 14.5 %    Platelets 226 150 - 450 x10*3/uL   POCT GLUCOSE   Result Value Ref Range    POCT Glucose 133 (H) 74 - 99 mg/dL       Constitutional: Well developed, awake, alert, oriented x3, no acute distress, cooperative   Eyes: EOMI, clear sclera   ENMT: mucous membranes moist, no lesions seen   Head/Neck: Neck supple, no apparent injury, head atraumatic   Respiratory/Thorax: CTAB, good chest expansion, respirations even and unlabored, mild expiratory upper airway wheezes   Cardiovascular: Regular rate and rhythm, no murmurs/rubs/gallops, normal S1 and S 2   Gastrointestinal: Abdomen nondistended, +BS, no bruits   Musculoskeletal: ROM intact, no joint swelling, normal  strength   Extremities: no cyanosis, edema, contusions or clubbing   Neurological: no focal deficit, pt alert and oriented x3   Psychological: Appropriate affect and behavior   Skin: Warm and dry, no lesions, no rashes  Genitourinary: Deal draining clear yellow urine       Past Medical History:   Diagnosis Date    CAD (coronary artery disease)     CHF (congestive heart failure) (CMS/[facility])     EF 40-45%    Chronic indwelling Deal catheter     Chronic renal disease, stage III (CMS/[facility])     Diabetes mellitus (CMS/HCC)     Eczema     Hiatal hernia     Hypertension     Infarction of spleen     Intracardiac thrombosis, not elsewhere classified     Apical mural thrombus    Old myocardial infarction     Prostate cancer (CMS/HCC)     Pulmonary nodules     Renal cyst     bilateral  "   TIA (transient ischemic attack)            Your medication list        START taking these medications        Instructions Last Dose Given Next Dose Due   amoxicillin 500 mg capsule  Commonly known as: Amoxil      Take 1 capsule (500 mg) by mouth every 8 hours for 4 days.              CHANGE how you take these medications        Instructions Last Dose Given Next Dose Due   metoprolol tartrate 25 mg tablet  Commonly known as: Lopressor  What changed:   medication strength  how much to take      Take 1 tablet (25 mg) by mouth 2 times a day.              CONTINUE taking these medications        Instructions Last Dose Given Next Dose Due   ammonium lactate 12 % cream  Commonly known as: Amlactin           aspirin 81 mg chewable tablet           atorvastatin 80 mg tablet  Commonly known as: Lipitor           gabapentin 100 mg capsule  Commonly known as: Neurontin           isosorbide mononitrate ER 30 mg 24 hr tablet  Commonly known as: Imdur           LEUPROLIDE (BULK) MISC           pen needle, diabetic 32 gauge x 5/32\" needle      Use one daily       SITagliptin phosphate 100 mg tablet  Commonly known as: Januvia      Take 1 tablet (100 mg) by mouth once daily.       tamsulosin 0.4 mg 24 hr capsule  Commonly known as: Flomax  Start taking on: December 17, 2023      Take 1 capsule (0.4 mg) by mouth once daily. Do not start before December 17, 2023.       torsemide 40 mg tablet      Take 40 mg by mouth once daily. Do not start before December 13, 2023.              STOP taking these medications      lisinopril 20 mg tablet        oxyCODONE 5 mg immediate release tablet  Commonly known as: Roxicodone                  Where to Get Your Medications        These medications were sent to [facility]/pharmacy #[id number] - [place], [place] - [address] [address]  [address] [address], [facility] [address]      Phone: [phone]   amoxicillin 500 mg capsule  metoprolol tartrate 25 mg tablet  tamsulosin 0.4 mg 24 hr " capsule         Future Appointments   Date Time Provider Department Center   12/19/2023 11:15 AM NEREIDA [id number] [place]   1/4/2024 11:30 AM [facility]   1/11/2024  2:30 PM Triston Samson MD [id number] [place]         Leana Kat, CNP  Hospital Medicine

## 2023-12-16 NOTE — PROGRESS NOTES
Occupational Therapy    Occupational Therapy    Evaluation    Patient Name: Dennis Galo  MRN: [id number]  Today's Date: 12/16/2023  Time Calculation  Start Time: 1000  Stop Time: 1030  Time Calculation (min): 30 min    Assessment  IP OT Assessment  OT Assessment:  (PATIENT WILL NEED ONGOING SUPPORT AT HOME  / OVERALL FUNCTION SEEMS CLOSE TO BASELINE - ONGOING THERAPY AT HOME WOULD BE HELPFUL TO ADVANCE FUNCTION/ LUE ROM / LE ADL SKILLS)  Prognosis: Good  Barriers to Discharge: None  Evaluation/Treatment Tolerance: Patient tolerated treatment well  Medical Staff Made Aware: Yes  End of Session Communication: Bedside nurse  End of Session Patient Position: Bed, 2 rail up    Plan:  Treatment Interventions: ADL retraining, UE strengthening/ROM, Patient/family training, Functional transfer training  OT Frequency: 3 times per week  OT Discharge Recommendations: Low intensity level of continued care    Subjective     Current Problem:  1. LAURA (acute kidney injury) (CMS/[facility])        2. Dehydration        3. Urinary tract infection associated with indwelling urethral catheter, initial encounter (CMS/[facility])        4. Gait instability  Referral to Home Care    CANCELED: Referral to Home Care      5. Lower urinary tract infection  amoxicillin (Amoxil) 500 mg capsule    Catheter Care      6. Urinary retention  tamsulosin (Flomax) 0.4 mg 24 hr capsule      7. Chronic systolic heart failure (CMS/[facility])  metoprolol tartrate (Lopressor) 25 mg tablet      8. Stage 3 chronic kidney disease, unspecified whether stage 3a or 3b CKD (CMS/[facility])  Basic metabolic panel          General:  General  Reason for Referral: OT EVAL AND TREAT  Referred By: TAYLOR  Past Medical History Relevant to Rehab: CKD,DM,HTN,HLD, OBESITY, CAD, PROSTATE CA  Family/Caregiver Present: No  Prior to Session Communication: Bedside nurse  Patient Position Received: Bed, 2 rail up  General Comment: PATIENT ADMITTED WITH DECREASED URINE OUTPUT/ RECENT D/C FROM HOSPITAL  WITH FAMILY BRINGING PATIENT BACK TO ED    Precautions:  Medical Precautions: Fall precautions (PER PATIENT HE HAS HAD 2 FALLS IN LAST 4 MONTHS - TRIPPED ON A RUG)    Vital Signs:       Pain:  Pain Assessment  Pain Assessment: 0-10  Pain Score: 4  Pain Type: Acute pain  Pain Location: Arm  Pain Orientation: Upper, Left  Pain Descriptors: Discomfort, Sharp  Pain Frequency: Other (Comment) (PAIN WITH MOVEMENT - PER PATIENT MAY HAVE STRAINED HIS ARM WHEN PULLING / ROLLING IN BED)  Patient's Stated Pain Goal: No pain (NURSING INFORMED)    Objective     Cognition:  Overall Cognitive Status: Within Functional Limits             Home Living:  Type of Home: House  Lives With: Spouse  Home Adaptive Equipment: Walker rolling or standard  Home Layout: One level, Laundry in basement  Home Access: Stairs to enter with rails  Entrance Stairs-Number of Steps: 1  Bathroom Shower/Tub: Tub/shower unit  Bathroom Toilet: Standard  Bathroom Equipment: Shower chair without back, Grab bars in shower     Prior Function:  Level of [place]: Needs assistance with ADLs, Needs assistance with homemaking  Receives Help From: Family  ADL Assistance: Needs assistance  Bath: Minimal  Dressing: Moderate  Hand Dominance: Right    IADL History:  Homemaking Responsibilities: No  Mode of Transportation: Car  Occupation: Retired  Type of Occupation: [employer]    ADL:  Eating Assistance: Independent  Grooming Assistance: Independent  UE Dressing Assistance: Stand by  LE Dressing Assistance: Moderate  Toileting Assistance with Device: Not performed    Activity Tolerance:  Endurance: Tolerates less than 10 min exercise, no significant change in vital signs    Bed Mobility/Transfers:   Bed Mobility  Bed Mobility: Yes  Bed Mobility 1  Bed Mobility 1: Side lying right to sit  Level of Assistance 1: Distant supervision  Bed Mobility Comments 1:  (improvement noted with verbal cues and second attempt)  Transfers  Transfer: Yes  Transfer 1  Transfer From 1: Sit  to  Transfer to 1: Stand  Technique 1: Sit to stand  Transfer Device 1: Walker  Transfer Level of Assistance 1: Contact guard    Ambulation/Gait Training:  Ambulation/Gait Training  Ambulation/Gait Training Performed: Yes    Sitting Balance:  Static Sitting Balance  Static Sitting-Balance Support: No upper extremity supported    Standing Balance:  Static Standing Balance  Static Standing-Balance Support: Bilateral upper extremity supported  Static Standing-Level of Assistance: Close supervision    Vision: Vision - Basic Assessment  Current Vision: No visual deficits   and Vision - Complex Assessment  Ocular Range of Motion: Within Functional Limits  Head Position: WFL  Tracking: WFL    Sensation:  Light Touch: No apparent deficits    Strength:  Strength Comments: RUE - WFL- LUE LIMITED IN SHOULDER - 3+/5 / OVERALL    Perception:  Inattention/Neglect: Appears intact    Coordination:  Finger to Nose: Intact     Hand Function:  Hand Function  Gross Grasp: Functional  Coordination: Functional    Extremities: RUE   RUE : Within Functional Limits and LUE   LUE:  (LUE - ABLE TO SLOWLY ACHIEVE FUNTIONAL ROM IN L SHOULDER ( PAIN)  / WFL FROM ELBOW DISTALLY)    Outcome Measures: [facility] Daily Activity  Putting on and taking off regular lower body clothing: A little  Bathing (including washing, rinsing, drying): A little  Putting on and taking off regular upper body clothing: A little  Toileting, which includes using toilet, bedpan or urinal: None  Taking care of personal grooming such as brushing teeth: None  Eating Meals: None  Daily Activity - Total Score: 21                    EDUCATION:  Education  Individual(s) Educated: Patient  Education Provided: Fall precautons  Risk and Benefits Discussed with Patient/Caregiver/Other: yes  Patient/Caregiver Demonstrated Understanding: yes  Education Documentation  Precautions, taught by Alexandr Rincon OT at 12/16/2023 10:54 AM.  Learner: Patient  Readiness: Acceptance  Method:  Explanation  Response: Verbalizes Understanding    Precautions, taught by Alexandr Rincon OT at 12/16/2023 10:53 AM.  Learner: Patient  Readiness: Acceptance  Method: Explanation  Response: Verbalizes Understanding  Comment: INSTRUCTED PATIENT TO HACE ALL THROW RUGS REMOVED TO ELIMINIATE FALL RISKS    Education Comments  No comments found.        Goals:   Encounter Problems       Encounter Problems (Active)       ADLs       Patient with complete lower body dressing with modified independent level of assistance donning and doffing all LE clothes  with PRN adaptive equipment while edge of bed  (Progressing)       Start:  12/16/23    Expected End:  12/30/23            Patient will complete toileting including hygiene clothing management/hygiene with modified independent level of assistance and raised toilet seat. (Progressing)       Start:  12/16/23    Expected End:  12/30/23               EXERCISE/STRENGTHENING       Patient with increase LUE HALF GRADE AND BE IN WITH AN UE HEP TO PROMOTE USE AND FUNCTION FOR DAILY ACTIVITIES (Progressing)       Start:  12/16/23    Expected End:  12/30/23               MOBILITY       Patient will perform Functional mobility mod  Household distances/Community Distances with set-up level of assistance and front wheeled walker in order to improve safety and functional mobility. (Progressing)       Start:  12/16/23    Expected End:  12/30/23                 TRANSFERS       Patient will complete functional transfer ON/OFF A TOILET AND IN/OUT OF A TUB with least restrictive device with set-up level of assistance. (Progressing)       Start:  12/16/23    Expected End:  12/30/23

## 2023-12-16 NOTE — CARE PLAN
Problem: Fall/Injury  Goal: Not fall by end of shift  Outcome: Progressing  Goal: Be free from injury by end of the shift  Outcome: Progressing  Goal: Verbalize understanding of personal risk factors for fall in the hospital  Outcome: Progressing  Goal: Verbalize understanding of risk factor reduction measures to prevent injury from fall in the home  Outcome: Progressing  Goal: Use assistive devices by end of the shift  Outcome: Progressing  Goal: Pace activities to prevent fatigue by end of the shift  Outcome: Progressing     Problem: Pain  Goal: Takes deep breaths with improved pain control throughout the shift  Outcome: Progressing  Goal: Turns in bed with improved pain control throughout the shift  Outcome: Progressing  Goal: Walks with improved pain control throughout the shift  Outcome: Progressing  Goal: Performs ADL's with improved pain control throughout shift  Outcome: Progressing  Goal: Participates in PT with improved pain control throughout the shift  Outcome: Progressing  Goal: Free from opioid side effects throughout the shift  Outcome: Progressing  Goal: Free from acute confusion related to pain meds throughout the shift  Outcome: Progressing

## 2023-12-16 NOTE — PROGRESS NOTES
Dennis Galo is a 83 y.o. male on day 3 of admission presenting with LAURA (acute kidney injury) (CMS/[facility]).      Subjective     Patient denies any nausea.  Does report sacral pain.       Objective     Urinary catheter is in place with a leg bag.  Clear yellow urine.       Vitals 24HR  Heart Rate:  [63-79]   Temp:  [36.4 °C (97.5 °F)-37.6 °C (99.7 °F)]   Resp:  [19-20]   BP: (130-153)/(61-73)   SpO2:  [90 %-92 %]         Intake/Output last 3 Shifts:    Intake/Output Summary (Last 24 hours) at 12/16/2023 1121  Last data filed at 12/16/2023 0411  Gross per 24 hour   Intake --   Output 900 ml   Net -900 ml       Physical Exam    Patient is lying flat.  No jugular distention.  Lungs are clear anteriorly.  There is no cardiac rub.  Abdomen is benign.  Minimal peripheral edema.    Assessment/Plan      Patient with underlying stage G3b/A2 chronic kidney disease with a baseline creatinine closer to 1.5.    Acute kidney injury continues to improve and creatinine near baseline.    Patient is now on torsemide 40 mg daily.  Looks as if he has reasonable urine output.    Will quantify proteinuria.  Check iron stores, B12 and folate level.    Patient is acceptable for discharge from a renal standpoint.    Marcelo Browning MD

## 2023-12-18 ENCOUNTER — HOME CARE VISIT (OUTPATIENT)
Dept: HOME HEALTH SERVICES | Facility: HOME HEALTH | Age: 83
End: 2023-12-18
Payer: MEDICARE

## 2023-12-18 VITALS — HEART RATE: 64 BPM | DIASTOLIC BLOOD PRESSURE: 62 MMHG | SYSTOLIC BLOOD PRESSURE: 138 MMHG

## 2023-12-18 PROCEDURE — G0151 HHCP-SERV OF PT,EA 15 MIN: HCPCS

## 2023-12-18 PROCEDURE — 400014 HH F/U

## 2023-12-18 ASSESSMENT — ACTIVITIES OF DAILY LIVING (ADL)
AMBULATION ASSISTANCE: ONE PERSON
AMBULATION ASSISTANCE: 1
AMBULATION ASSISTANCE ON FLAT SURFACES: 1
OASIS_M1830: 05

## 2023-12-18 ASSESSMENT — ENCOUNTER SYMPTOMS
HIGHEST PAIN SEVERITY IN PAST 24 HOURS: 10/10
OCCASIONAL FEELINGS OF UNSTEADINESS: 0
PAIN: 1
PERSON REPORTING PAIN: PATIENT
PAIN LOCATION: COCCYX
HYPERTENSION: 1
LOWEST PAIN SEVERITY IN PAST 24 HOURS: 5/10

## 2023-12-18 NOTE — HOME HEALTH
patient seen for pt dee today with wife present.  he went back to the hospital due to having blood in his urine .  he does have a catheter which he says he will have for the rest of his life .  prior to this he walked with a ww , needed assist with shoes and socks.  he sponge bathes at this time.  they live in a ranch home with 3 steps to exit

## 2023-12-19 ENCOUNTER — OFFICE VISIT (OUTPATIENT)
Dept: NEUROSURGERY | Facility: CLINIC | Age: 83
End: 2023-12-19
Payer: MEDICARE

## 2023-12-19 ENCOUNTER — DOCUMENTATION (OUTPATIENT)
Dept: CASE MANAGEMENT | Facility: HOSPITAL | Age: 83
End: 2023-12-19

## 2023-12-19 VITALS
BODY MASS INDEX: 32.2 KG/M2 | SYSTOLIC BLOOD PRESSURE: 145 MMHG | HEART RATE: 67 BPM | WEIGHT: 230 LBS | DIASTOLIC BLOOD PRESSURE: 78 MMHG | TEMPERATURE: 98.1 F | HEIGHT: 71 IN | RESPIRATION RATE: 20 BRPM

## 2023-12-19 DIAGNOSIS — Z85.46 H/O PROSTATE CANCER: ICD-10-CM

## 2023-12-19 DIAGNOSIS — M48.062 SPINAL STENOSIS OF LUMBAR REGION WITH NEUROGENIC CLAUDICATION: Primary | ICD-10-CM

## 2023-12-19 PROCEDURE — 1125F AMNT PAIN NOTED PAIN PRSNT: CPT | Performed by: NURSE PRACTITIONER

## 2023-12-19 PROCEDURE — 99214 OFFICE O/P EST MOD 30 MIN: CPT | Performed by: NURSE PRACTITIONER

## 2023-12-19 PROCEDURE — 1036F TOBACCO NON-USER: CPT | Performed by: NURSE PRACTITIONER

## 2023-12-19 PROCEDURE — 3077F SYST BP >= 140 MM HG: CPT | Performed by: NURSE PRACTITIONER

## 2023-12-19 PROCEDURE — 1160F RVW MEDS BY RX/DR IN RCRD: CPT | Performed by: NURSE PRACTITIONER

## 2023-12-19 PROCEDURE — 3078F DIAST BP <80 MM HG: CPT | Performed by: NURSE PRACTITIONER

## 2023-12-19 PROCEDURE — 1111F DSCHRG MED/CURRENT MED MERGE: CPT | Performed by: NURSE PRACTITIONER

## 2023-12-19 PROCEDURE — 1159F MED LIST DOCD IN RCRD: CPT | Performed by: NURSE PRACTITIONER

## 2023-12-19 ASSESSMENT — ENCOUNTER SYMPTOMS
OCCASIONAL FEELINGS OF UNSTEADINESS: 1
DEPRESSION: 0
LOSS OF SENSATION IN FEET: 1

## 2023-12-19 ASSESSMENT — ACTIVITIES OF DAILY LIVING (ADL): ENTERING_EXITING_HOME: MINIMUM ASSIST

## 2023-12-19 ASSESSMENT — PAIN SCALES - GENERAL: PAINLEVEL: 6

## 2023-12-19 NOTE — PROGRESS NOTES
"Dennis Galo is here today, with his nephew, for hospital follow up for lumbar stenosis and to review images. To review, he was initially evaluated while hospitalized at [facility] on 12/11/2023. He reported chronic LBP, and presented with hematuria with his chronic urinary catheter. He had requested spine imaging during hospitalization. CT L Spine demonstrated lumbar canal narrowing at L2 - 3 and L4 - 5. Of note, he has history of prostate cancer. On exam, he had no neuro deficits. Orders were written for follow up if he desired.    Today, he reports LBP and buttocks pain. He is here to discuss and review findings. He uses a walker at home since he's \"wavy\" at home, over the past 6 months.    TREATMENTS:  PT at [facility]  Home Exercise Program    SMOKER: Remote  ANTICOAGULANT USE: YES: Daily ASA    ROS x 10 is, otherwise, negative unless documented in HPI above    On Exam: Appears comfortable. Arrives to appointment in wheelchair  A&O x 4, speech clear / fluent  Respirations even / unlabored  Abdomen without distension  YOUNG readily, strength is 5/5, SILT in BLE  Deal in place (chronic)    We reviewed lumbar imaging from his hospitalization and discussed MRI L Spine as next step for evaluation of canal narrowing as noted on CT L Spine, to determine if surgical intervention is indicated. His prostate cancer is remote. He verbalizes understanding and agreement with plan to follow up once MRI is completed to discuss treatment options based on MRI findings.  TOTAL TIME:  Time preparing / completing chart: 7 MIN  Time in face to face contact with patient, including counseling: >35 MIN  Kylah Worrell, APRN-CNP        "

## 2023-12-20 ENCOUNTER — HOME CARE VISIT (OUTPATIENT)
Dept: HOME HEALTH SERVICES | Facility: HOME HEALTH | Age: 83
End: 2023-12-20
Payer: MEDICARE

## 2023-12-20 ENCOUNTER — PATIENT OUTREACH (OUTPATIENT)
Dept: CARE COORDINATION | Facility: CLINIC | Age: 83
End: 2023-12-20
Payer: MEDICARE

## 2023-12-20 PROCEDURE — G0152 HHCP-SERV OF OT,EA 15 MIN: HCPCS

## 2023-12-20 SDOH — ECONOMIC STABILITY: HOUSING INSECURITY
HOME SAFETY: SMOKE DETECTORS NOT HUNG UP ON THE CEILING. RECOMMENDED TO HAVE A FAMILY MEMBER HANG UP THE SMOKE DETECTOR ON THE BEDROOM CEILING.

## 2023-12-20 ASSESSMENT — ENCOUNTER SYMPTOMS
HIGHEST PAIN SEVERITY IN PAST 24 HOURS: 10/10
LOWEST PAIN SEVERITY IN PAST 24 HOURS: 4/10
PAIN: 1
PERSON REPORTING PAIN: PATIENT

## 2023-12-20 ASSESSMENT — ACTIVITIES OF DAILY LIVING (ADL)
PHYSICAL TRANSFERS ASSESSED: 1
PREPARING MEALS: DEPENDENT
TOILETING: INDEPENDENT
DRESSING_LB_CURRENT_FUNCTION: MINIMUM ASSIST
GROOMING_CURRENT_FUNCTION: INDEPENDENT
CURRENT_FUNCTION: INDEPENDENT
LAUNDRY ASSESSED: 1
LAUNDRY: DEPENDENT
GROOMING ASSESSED: 1
BATHING_CURRENT_FUNCTION: MODERATE ASSIST
DRESSING_UB_CURRENT_FUNCTION: INDEPENDENT
BATHING ASSESSED: 1
TOILETING: 1

## 2023-12-20 NOTE — CASE COMMUNICATION
OT evaluation/DC visit completed 12.20.23. Patient is functioning at his baseline level. Instructed on home safety.

## 2023-12-20 NOTE — PROGRESS NOTES
Outreach call to patient to support a smooth transition of care from recent admission.  Left voicemail message for patient with my contact information.

## 2023-12-20 NOTE — HOME HEALTH
Patient seen with spouse for OT evaluation visit. Patient was recently hospitalized with blood in urine. He got a catheter, and states he will have a catheter permanently. Lives with spouse in a ranch home with 1 step to enter the garage, 1 step up to 1st floor. Stays on 1st floor with bedroom and full bathroom with a tub shower. He is supposed to get an MRI for his low back, he saw Kylah Worrell NP, yesterday. He has been having pain in his buttocks.    Medical history of HLD, DM, HTN, spinal stenosis, prostate CA.     Patient has a wheeled walker.     Prior to hospitalization, was independent with ADLs except needed help with shoes and socks. Spouse performed IADLs. Used a wheeled walker since February 2023.     Barthel index-95 out of 100.     UE AROM and strength WNL. Patient in agreement with OT POC and no further OT visits indicated. Patient is functioning at his baseline for ADLs, and spouse performs IADLs. OT DC at this date, goals met. SN and PT services are still active. SN visits not on patient's schedule yet, sent secure chat to Dedrick Goldstein PT and [facility] to notify.
sent to ED for klebsiella in urine culture s/p recent prostate biopsy x last week, recently seen at [facility]/had abbott placed for urinary retention - now resolved

## 2023-12-21 ENCOUNTER — TELEPHONE (OUTPATIENT)
Dept: PRIMARY CARE | Facility: CLINIC | Age: 83
End: 2023-12-21
Payer: MEDICARE

## 2023-12-21 SDOH — ECONOMIC STABILITY: FOOD INSECURITY: WITHIN THE PAST 12 MONTHS, THE FOOD YOU BOUGHT JUST DIDN'T LAST AND YOU DIDN'T HAVE MONEY TO GET MORE.: NEVER TRUE

## 2023-12-21 SDOH — ECONOMIC STABILITY: INCOME INSECURITY: HOW HARD IS IT FOR YOU TO PAY FOR THE VERY BASICS LIKE FOOD, HOUSING, MEDICAL CARE, AND HEATING?: NOT HARD AT ALL

## 2023-12-21 SDOH — ECONOMIC STABILITY: HOUSING INSECURITY: IN THE LAST 12 MONTHS, HOW MANY PLACES HAVE YOU LIVED?: 1

## 2023-12-21 SDOH — HEALTH STABILITY: PHYSICAL HEALTH

## 2023-12-21 SDOH — HEALTH STABILITY: PHYSICAL HEALTH: ON AVERAGE, HOW MANY MINUTES DO YOU ENGAGE IN EXERCISE AT THIS LEVEL?: PATIENT DECLINED

## 2023-12-21 SDOH — ECONOMIC STABILITY: INCOME INSECURITY: IN THE LAST 12 MONTHS, WAS THERE A TIME WHEN YOU WERE NOT ABLE TO PAY THE MORTGAGE OR RENT ON TIME?: NO

## 2023-12-21 SDOH — ECONOMIC STABILITY: FOOD INSECURITY: WITHIN THE PAST 12 MONTHS, YOU WORRIED THAT YOUR FOOD WOULD RUN OUT BEFORE YOU GOT MONEY TO BUY MORE.: NEVER TRUE

## 2023-12-21 SDOH — HEALTH STABILITY: MENTAL HEALTH
STRESS IS WHEN SOMEONE FEELS TENSE, NERVOUS, ANXIOUS, OR CAN'T SLEEP AT NIGHT BECAUSE THEIR MIND IS TROUBLED. HOW STRESSED ARE YOU?: PATIENT DECLINED

## 2023-12-21 SDOH — ECONOMIC STABILITY: GENERAL: WOULD YOU LIKE HELP WITH ANY OF THE FOLLOWING NEEDS?: I DONT NEED HELP WITH ANY OF THESE

## 2023-12-21 NOTE — TELEPHONE ENCOUNTER
Dedrick with [facility] called to get a verbal order for liu care and lab work done for patient. Called back and lvm to okay care per Dr Samson.

## 2023-12-22 PROCEDURE — 400014 HH F/U

## 2023-12-27 ENCOUNTER — APPOINTMENT (OUTPATIENT)
Dept: HOME HEALTH SERVICES | Facility: HOME HEALTH | Age: 83
End: 2023-12-27
Payer: MEDICARE

## 2023-12-28 ENCOUNTER — HOME CARE VISIT (OUTPATIENT)
Dept: HOME HEALTH SERVICES | Facility: HOME HEALTH | Age: 83
End: 2023-12-28
Payer: MEDICARE

## 2023-12-28 PROCEDURE — 400014 HH F/U

## 2023-12-28 PROCEDURE — G0151 HHCP-SERV OF PT,EA 15 MIN: HCPCS

## 2023-12-28 SDOH — HEALTH STABILITY: PHYSICAL HEALTH: EXERCISE TYPE: SITTING EXS X 20 REPS , SUPINE EXS X 15 REPS

## 2023-12-28 ASSESSMENT — ENCOUNTER SYMPTOMS
HIGHEST PAIN SEVERITY IN PAST 24 HOURS: 10/10
PERSON REPORTING PAIN: PATIENT
LOWEST PAIN SEVERITY IN PAST 24 HOURS: 0/10
PAIN LOCATION: COCCYX
PAIN: 1

## 2023-12-28 NOTE — HOME HEALTH
patient seen for pt routine visit today with wife present .  he states no new concerns or complaints.

## 2023-12-29 ENCOUNTER — APPOINTMENT (OUTPATIENT)
Dept: HOME HEALTH SERVICES | Facility: HOME HEALTH | Age: 83
End: 2023-12-29
Payer: MEDICARE

## 2023-12-30 ENCOUNTER — HOME CARE VISIT (OUTPATIENT)
Dept: HOME HEALTH SERVICES | Facility: HOME HEALTH | Age: 83
End: 2023-12-30
Payer: MEDICARE

## 2023-12-30 VITALS
RESPIRATION RATE: 18 BRPM | HEART RATE: 78 BPM | OXYGEN SATURATION: 95 % | DIASTOLIC BLOOD PRESSURE: 70 MMHG | SYSTOLIC BLOOD PRESSURE: 140 MMHG | TEMPERATURE: 98.2 F

## 2023-12-30 PROCEDURE — G0299 HHS/HOSPICE OF RN EA 15 MIN: HCPCS

## 2023-12-30 ASSESSMENT — ENCOUNTER SYMPTOMS
MUSCLE WEAKNESS: 1
LOWEST PAIN SEVERITY IN PAST 24 HOURS: 0/10
HIGHEST PAIN SEVERITY IN PAST 24 HOURS: 8/10
PAIN SEVERITY GOAL: 0/10
APPETITE LEVEL: FAIR

## 2023-12-30 ASSESSMENT — PAIN SCALES - PAIN ASSESSMENT IN ADVANCED DEMENTIA (PAINAD): BREATHING: 0

## 2024-01-03 ENCOUNTER — LAB (OUTPATIENT)
Dept: LAB | Facility: LAB | Age: 84
End: 2024-01-03
Payer: MEDICARE

## 2024-01-03 DIAGNOSIS — N18.32 CHRONIC KIDNEY DISEASE, STAGE 3B (MULTI): Primary | ICD-10-CM

## 2024-01-03 DIAGNOSIS — I10 ESSENTIAL (PRIMARY) HYPERTENSION: ICD-10-CM

## 2024-01-03 LAB
ANION GAP SERPL CALC-SCNC: 14 MMOL/L (ref 10–20)
APPEARANCE UR: ABNORMAL
BACTERIA #/AREA URNS AUTO: ABNORMAL /HPF
BILIRUB UR STRIP.AUTO-MCNC: NEGATIVE MG/DL
BUN SERPL-MCNC: 24 MG/DL (ref 6–23)
CALCIUM SERPL-MCNC: 8.9 MG/DL (ref 8.6–10.6)
CHLORIDE SERPL-SCNC: 97 MMOL/L (ref 98–107)
CO2 SERPL-SCNC: 30 MMOL/L (ref 21–32)
COLOR UR: YELLOW
CREAT SERPL-MCNC: 1.72 MG/DL (ref 0.5–1.3)
GFR SERPL CREATININE-BSD FRML MDRD: 39 ML/MIN/1.73M*2
GLUCOSE SERPL-MCNC: 383 MG/DL (ref 74–99)
GLUCOSE UR STRIP.AUTO-MCNC: ABNORMAL MG/DL
HYALINE CASTS #/AREA URNS AUTO: ABNORMAL /LPF
KETONES UR STRIP.AUTO-MCNC: NEGATIVE MG/DL
LEUKOCYTE ESTERASE UR QL STRIP.AUTO: ABNORMAL
MAGNESIUM SERPL-MCNC: 2.02 MG/DL (ref 1.6–2.4)
MUCOUS THREADS #/AREA URNS AUTO: ABNORMAL /LPF
NITRITE UR QL STRIP.AUTO: NEGATIVE
PH UR STRIP.AUTO: 5 [PH]
POTASSIUM SERPL-SCNC: 3.9 MMOL/L (ref 3.5–5.3)
PROT UR STRIP.AUTO-MCNC: ABNORMAL MG/DL
RBC # UR STRIP.AUTO: ABNORMAL /UL
RBC #/AREA URNS AUTO: >20 /HPF
SODIUM SERPL-SCNC: 137 MMOL/L (ref 136–145)
SP GR UR STRIP.AUTO: 1.01
URATE SERPL-MCNC: 7.6 MG/DL (ref 4–7.5)
UROBILINOGEN UR STRIP.AUTO-MCNC: <2 MG/DL
WBC #/AREA URNS AUTO: >50 /HPF
WBC CLUMPS #/AREA URNS AUTO: ABNORMAL /HPF

## 2024-01-03 PROCEDURE — 81001 URINALYSIS AUTO W/SCOPE: CPT

## 2024-01-03 PROCEDURE — 84550 ASSAY OF BLOOD/URIC ACID: CPT

## 2024-01-03 PROCEDURE — 83735 ASSAY OF MAGNESIUM: CPT

## 2024-01-03 PROCEDURE — 36415 COLL VENOUS BLD VENIPUNCTURE: CPT

## 2024-01-03 PROCEDURE — 80048 BASIC METABOLIC PNL TOTAL CA: CPT

## 2024-01-04 ENCOUNTER — OFFICE VISIT (OUTPATIENT)
Dept: UROLOGY | Facility: HOSPITAL | Age: 84
End: 2024-01-04
Payer: MEDICARE

## 2024-01-04 DIAGNOSIS — R31.9 HEMATURIA, UNSPECIFIED TYPE: ICD-10-CM

## 2024-01-04 DIAGNOSIS — C61 PROSTATE CANCER (MULTI): ICD-10-CM

## 2024-01-04 DIAGNOSIS — N31.9 NEUROGENIC BLADDER: ICD-10-CM

## 2024-01-04 PROCEDURE — 1125F AMNT PAIN NOTED PAIN PRSNT: CPT | Performed by: UROLOGY

## 2024-01-04 PROCEDURE — 1036F TOBACCO NON-USER: CPT | Performed by: UROLOGY

## 2024-01-04 PROCEDURE — 99214 OFFICE O/P EST MOD 30 MIN: CPT | Performed by: UROLOGY

## 2024-01-04 PROCEDURE — 1160F RVW MEDS BY RX/DR IN RCRD: CPT | Performed by: UROLOGY

## 2024-01-04 PROCEDURE — 1159F MED LIST DOCD IN RCRD: CPT | Performed by: UROLOGY

## 2024-01-04 PROCEDURE — 1111F DSCHRG MED/CURRENT MED MERGE: CPT | Performed by: UROLOGY

## 2024-01-04 PROCEDURE — G0179 MD RECERTIFICATION HHA PT: HCPCS | Performed by: UROLOGY

## 2024-01-04 NOTE — PROGRESS NOTES
HPI  83-year-old male with recurrent episodes of urinary retention, significant obstructive and irritative symptoms at baseline. Significant preoperative urge and urge incontinence. Tried and failed InterStim x2 back in 2019. Does not seem like he ever had a prostate procedure or urodynamics however. He does have insulin-dependent diabetes and neuropathy and nephropathy.    UDS 4/20/22 with DO, normal compliance, slightly diminshed capacity, moderate pressure (71 cm H20) bladder contraction with minimal flow c/w obstruction.    Now s/p HoLEP, 100U botox 6/17/22.     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 250cc instilled, no urge to void   6/21/22 - returns for PVR check, 34cc. voiding, issues with leakage.   7/5/22 - returns for 2 week follow up. PVR 93cc, voided a couple hours ago. still with signfiicant UUI. preop was using 4-5 diapers a day, now upwards of 8-10. intermittent blood in the urine. no DANA. started on myrbetriq 50mg daily  8/8/22 - UUI improving, now about equal to preop levels.    Prostate Cancer staging:  CT A/P 7/2022 - multiple lesions in the pelvic bones, lungs  CT chest - multiple small nodules bilatearlly.  NM bone scan - abnormal uptake in the pelvis    has seen rad onc and med onc. started on ADT + enzalutamide. No plan for radiation therapy at this time.    9/20/22 -returns for follow-up. Urge and urge incontinence is about the same. PVR 18 cc.     11/14/22- Presents today for cystoscopy/ botox 200U. Urge and urge incontinence is the same. Took antibiotic this morning.     11/29/22 - PVR 81cc. Still has nocturia 3-4x, urinary symptoms are similar to pre Botox, no improvement. Will start on Myrbetriq 50mg.    12/28/22 - seen in follow up. no better on myrbetriq. continues on hormonal therapy.     Hospitalized 2/7/2023 through 2/9/2023 with COVID, urinary retention, encephalopathy. A catheter was placed and a voiding trial was  "attempted but failed.    2/20/23 -successful T OV today.    3/6/23 - seen for PVR check, 450+cc. leaking continuously. comfortable    4/17/23- Patient presents for a follow up on urinary retention. Patient still has urgency and incontinence.   Patient had stroke 10 years ago. here for cysto, urethra was widely opened. Discussed his atonic bladder - discussed self catheterization, patient declined. Set up HH cath changes, patient follow up prn.    UDS 4/2023 -  atonic bladder, complete retention    US Renal 12/14/23: Personally reviewed.  IMPRESSION:  1. Bilateral renal cysts; no hydronephrosis.  2. Bladder wall thickening and trabeculation.    1/4/24 - seen today for fu to recent ed admission for UTI/LAURA. US showed bilateral renal cysts with no hydro. Had gross hematuria, now resolved, we discussed the need for a cysto at some point. Today, reports issues with liu, does not appear to be in bladder.      Lab Results   Component Value Date    PSA 4.83 (H) 12/10/2023    PSA 0.30 12/20/2022    PSA 0.36 11/21/2022    PSA 0.63 10/24/2022    PSA 1.70 09/23/2022         Current Medications:  Current Outpatient Medications   Medication Sig Dispense Refill    ammonium lactate (Amlactin) 12 % cream Apply 1 Application topically if needed for dry skin.      aspirin 81 mg chewable tablet Chew 1 tablet (81 mg) once daily.      atorvastatin (Lipitor) 80 mg tablet Take 1 tablet (80 mg) by mouth once daily.      gabapentin (Neurontin) 100 mg capsule Take 1 capsule (100 mg) by mouth 3 times a day.      isosorbide mononitrate ER (Imdur) 30 mg 24 hr tablet Take 1 tablet (30 mg) by mouth once daily.      leuprolide acetate (LEUPROLIDE, BULK, MISC) 5 mg. Lupron 5 MG      metoprolol tartrate (Lopressor) 25 mg tablet Take 1 tablet (25 mg) by mouth 2 times a day. 60 tablet 0    pen needle, diabetic 32 gauge x 5/32\" needle Use one daily 100 each 2    sitaGLIPtin phosphate (Januvia) 100 mg tablet Take 1 tablet (100 mg) by mouth once daily. " 30 tablet 0    tamsulosin (Flomax) 0.4 mg 24 hr capsule Take 1 capsule (0.4 mg) by mouth once daily. Do not start before December 17, 2023. 30 capsule 0    torsemide 40 mg tablet Take 40 mg by mouth once daily. Do not start before December 13, 2023. 60 tablet 0     No current facility-administered medications for this visit.        Active Problems:  Dennis Galo is a 83 y.o. male with the following Problems and Medications.  Patient Active Problem List   Diagnosis    Atonic bladder    Balanitis    Bilateral edema of lower extremity    BPH (benign prostatic hyperplasia)    CAD (coronary artery disease)    Chronic low back pain    CKD (chronic kidney disease), stage III (CMS/[facility])    Diabetic nephropathy (CMS/[facility])    Dribbling of urine    Gait instability    Gout    Hypertension associated with diabetes (CMS/[facility])    Neurogenic bladder    Neuropathic pain    Primary osteoarthritis of right hip    Prostate cancer (CMS/[facility])    S/P CABG (coronary artery bypass graft)    Severe obesity (BMI 35.0-39.9) with comorbidity (CMS/[facility])    Systolic heart failure (CMS/[facility])    Urge incontinence of urine    Urinary retention    Venous stasis dermatitis of both lower extremities    Wound of skin    Overflow incontinence of urine    Type 2 diabetes mellitus with chronic kidney disease, with long-term current use of insulin (CMS/[facility])    Type 2 diabetes mellitus with diabetic peripheral angiopathy without gangrene, with long-term current use of insulin (CMS/[facility])    Bronchiectasis, uncomplicated (CMS/[facility])    Lower urinary tract infection    LAURA (acute kidney injury) (CMS/[facility])     Current Outpatient Medications   Medication Sig Dispense Refill    ammonium lactate (Amlactin) 12 % cream Apply 1 Application topically if needed for dry skin.      aspirin 81 mg chewable tablet Chew 1 tablet (81 mg) once daily.      atorvastatin (Lipitor) 80 mg tablet Take 1 tablet (80 mg) by mouth once daily.      gabapentin (Neurontin) 100 mg capsule Take 1  "capsule (100 mg) by mouth 3 times a day.      isosorbide mononitrate ER (Imdur) 30 mg 24 hr tablet Take 1 tablet (30 mg) by mouth once daily.      leuprolide acetate (LEUPROLIDE, BULK, MISC) 5 mg. Lupron 5 MG      metoprolol tartrate (Lopressor) 25 mg tablet Take 1 tablet (25 mg) by mouth 2 times a day. 60 tablet 0    pen needle, diabetic 32 gauge x 5/32\" needle Use one daily 100 each 2    sitaGLIPtin phosphate (Januvia) 100 mg tablet Take 1 tablet (100 mg) by mouth once daily. 30 tablet 0    tamsulosin (Flomax) 0.4 mg 24 hr capsule Take 1 capsule (0.4 mg) by mouth once daily. Do not start before 2023. 30 capsule 0    torsemide 40 mg tablet Take 40 mg by mouth once daily. Do not start before 2023. 60 tablet 0     No current facility-administered medications for this visit.       PMH:  Past Medical History:   Diagnosis Date    CAD (coronary artery disease)     CHF (congestive heart failure) (CMS/HCC)     EF 40-45%    Chronic indwelling Deal catheter     Chronic renal disease, stage III (CMS/HCC)     Diabetes mellitus (CMS/HCC)     Eczema     Hiatal hernia     Hypertension     Infarction of spleen     Intracardiac thrombosis, not elsewhere classified     Apical mural thrombus    Old myocardial infarction     Prostate cancer (CMS/HCC)     Pulmonary nodules     Renal cyst     bilateral    TIA (transient ischemic attack)        PSH:  Past Surgical History:   Procedure Laterality Date    CARDIAC CATHETERIZATION      2 stents    CORONARY ARTERY BYPASS GRAFT      PILONIDAL CYST DRAINAGE  2018    Pilonidal Cyst Resection    PROSTATE SURGERY         FMH:  No family history on file.    SHx:  Social History     Tobacco Use    Smoking status: Former     Types: Cigarettes     Quit date:      Years since quittin.0    Smokeless tobacco: Never   Substance Use Topics    Alcohol use: Never    Drug use: Never       Allergies:  Allergies   Allergen Reactions    Terazosin Palpitations and " Dizziness     Dizziness and heart pounding         Assessment/Plan  Liu replaced, 500cc drained as liu was previously not in bladder  IR SPT given long term retention, discomfort with liu. Will see him back 1 mo later for first exchange and then transition to   Oncology appt. He has declined treatment to this point, but I suspect his bleeding likely from locally advanced PCa  Cysto for hematuria      Scribe Attestation  By signing my name below, I, Michelle Alfonso , Scribe   attest that this documentation has been prepared under the direction and in the presence of Castro Quintanilla MD.

## 2024-01-05 ENCOUNTER — APPOINTMENT (OUTPATIENT)
Dept: HOME HEALTH SERVICES | Facility: HOME HEALTH | Age: 84
End: 2024-01-05
Payer: MEDICARE

## 2024-01-09 DIAGNOSIS — E11.51 TYPE 2 DIABETES MELLITUS WITH DIABETIC PERIPHERAL ANGIOPATHY WITHOUT GANGRENE, WITH LONG-TERM CURRENT USE OF INSULIN (MULTI): ICD-10-CM

## 2024-01-09 DIAGNOSIS — Z79.4 TYPE 2 DIABETES MELLITUS WITH DIABETIC PERIPHERAL ANGIOPATHY WITHOUT GANGRENE, WITH LONG-TERM CURRENT USE OF INSULIN (MULTI): ICD-10-CM

## 2024-01-10 ENCOUNTER — PATIENT OUTREACH (OUTPATIENT)
Dept: HEMATOLOGY/ONCOLOGY | Facility: HOSPITAL | Age: 84
End: 2024-01-10
Payer: MEDICARE

## 2024-01-10 ENCOUNTER — APPOINTMENT (OUTPATIENT)
Dept: HOME HEALTH SERVICES | Facility: HOME HEALTH | Age: 84
End: 2024-01-10
Payer: MEDICARE

## 2024-01-10 NOTE — PROGRESS NOTES
01/10/2024 11:01AM Patient was found on the Work que with a referral from Dr. Samson on 10/10/2023, a message was left on his phone voicemail to call back on 10/25/2023 per scheduling. He recently saw Dr. Quintanilla his urologist on 01/04/2024 who is also referring him to oncology. Patient was contacted per scheduling om 01/04/2024 and 01/09/2024, Per rashid he was last seen by Dr. Allen in December of 2022, yet no follow-up appointments seen. Left patient a message today, myself to see if he wishes to schedule. Bruno Ortiz RN.

## 2024-01-11 ENCOUNTER — LAB (OUTPATIENT)
Dept: LAB | Facility: LAB | Age: 84
End: 2024-01-11
Payer: MEDICARE

## 2024-01-11 ENCOUNTER — OFFICE VISIT (OUTPATIENT)
Dept: PRIMARY CARE | Facility: CLINIC | Age: 84
End: 2024-01-11
Payer: MEDICARE

## 2024-01-11 VITALS
DIASTOLIC BLOOD PRESSURE: 76 MMHG | HEART RATE: 80 BPM | HEIGHT: 71 IN | SYSTOLIC BLOOD PRESSURE: 144 MMHG | TEMPERATURE: 97.2 F | OXYGEN SATURATION: 94 % | WEIGHT: 243.4 LBS | BODY MASS INDEX: 34.07 KG/M2

## 2024-01-11 DIAGNOSIS — E11.51 TYPE 2 DIABETES MELLITUS WITH DIABETIC PERIPHERAL ANGIOPATHY WITHOUT GANGRENE, WITH LONG-TERM CURRENT USE OF INSULIN (MULTI): ICD-10-CM

## 2024-01-11 DIAGNOSIS — I50.22 CHRONIC SYSTOLIC HEART FAILURE (MULTI): ICD-10-CM

## 2024-01-11 DIAGNOSIS — Z79.4 TYPE 2 DIABETES MELLITUS WITH DIABETIC PERIPHERAL ANGIOPATHY WITHOUT GANGRENE, WITH LONG-TERM CURRENT USE OF INSULIN (MULTI): ICD-10-CM

## 2024-01-11 LAB
ALBUMIN SERPL BCP-MCNC: 3.3 G/DL (ref 3.4–5)
ALP SERPL-CCNC: 86 U/L (ref 33–136)
ALT SERPL W P-5'-P-CCNC: 8 U/L (ref 10–52)
ANION GAP SERPL CALC-SCNC: 12 MMOL/L (ref 10–20)
AST SERPL W P-5'-P-CCNC: 11 U/L (ref 9–39)
BILIRUB SERPL-MCNC: 0.8 MG/DL (ref 0–1.2)
BUN SERPL-MCNC: 24 MG/DL (ref 6–23)
CALCIUM SERPL-MCNC: 8.5 MG/DL (ref 8.6–10.6)
CHLORIDE SERPL-SCNC: 95 MMOL/L (ref 98–107)
CO2 SERPL-SCNC: 31 MMOL/L (ref 21–32)
CREAT SERPL-MCNC: 1.96 MG/DL (ref 0.5–1.3)
EGFRCR SERPLBLD CKD-EPI 2021: 34 ML/MIN/1.73M*2
ERYTHROCYTE [DISTWIDTH] IN BLOOD BY AUTOMATED COUNT: 13 % (ref 11.5–14.5)
GLUCOSE SERPL-MCNC: 483 MG/DL (ref 74–99)
HCT VFR BLD AUTO: 40.8 % (ref 41–52)
HGB BLD-MCNC: 13.4 G/DL (ref 13.5–17.5)
MCH RBC QN AUTO: 29.8 PG (ref 26–34)
MCHC RBC AUTO-ENTMCNC: 32.8 G/DL (ref 32–36)
MCV RBC AUTO: 91 FL (ref 80–100)
NRBC BLD-RTO: 0 /100 WBCS (ref 0–0)
PLATELET # BLD AUTO: 252 X10*3/UL (ref 150–450)
POTASSIUM SERPL-SCNC: 4.1 MMOL/L (ref 3.5–5.3)
PROT SERPL-MCNC: 6.4 G/DL (ref 6.4–8.2)
RBC # BLD AUTO: 4.49 X10*6/UL (ref 4.5–5.9)
SODIUM SERPL-SCNC: 134 MMOL/L (ref 136–145)
WBC # BLD AUTO: 13 X10*3/UL (ref 4.4–11.3)

## 2024-01-11 PROCEDURE — 99214 OFFICE O/P EST MOD 30 MIN: CPT | Performed by: INTERNAL MEDICINE

## 2024-01-11 PROCEDURE — 1125F AMNT PAIN NOTED PAIN PRSNT: CPT | Performed by: INTERNAL MEDICINE

## 2024-01-11 PROCEDURE — 1159F MED LIST DOCD IN RCRD: CPT | Performed by: INTERNAL MEDICINE

## 2024-01-11 PROCEDURE — 3077F SYST BP >= 140 MM HG: CPT | Performed by: INTERNAL MEDICINE

## 2024-01-11 PROCEDURE — 80053 COMPREHEN METABOLIC PANEL: CPT

## 2024-01-11 PROCEDURE — 1160F RVW MEDS BY RX/DR IN RCRD: CPT | Performed by: INTERNAL MEDICINE

## 2024-01-11 PROCEDURE — 1111F DSCHRG MED/CURRENT MED MERGE: CPT | Performed by: INTERNAL MEDICINE

## 2024-01-11 PROCEDURE — 85027 COMPLETE CBC AUTOMATED: CPT

## 2024-01-11 PROCEDURE — 3078F DIAST BP <80 MM HG: CPT | Performed by: INTERNAL MEDICINE

## 2024-01-11 PROCEDURE — 36415 COLL VENOUS BLD VENIPUNCTURE: CPT

## 2024-01-11 PROCEDURE — 1036F TOBACCO NON-USER: CPT | Performed by: INTERNAL MEDICINE

## 2024-01-11 RX ORDER — FLASH GLUCOSE SENSOR
KIT MISCELLANEOUS
Qty: 1 EACH | Refills: 0 | Status: SHIPPED | OUTPATIENT
Start: 2024-01-11 | End: 2024-01-23 | Stop reason: SDUPTHER

## 2024-01-11 RX ORDER — METOPROLOL TARTRATE 25 MG/1
25 TABLET, FILM COATED ORAL 2 TIMES DAILY
Qty: 180 TABLET | Refills: 3 | Status: SHIPPED | OUTPATIENT
Start: 2024-01-11 | End: 2024-01-11 | Stop reason: SDUPTHER

## 2024-01-11 RX ORDER — TORSEMIDE 100 MG/1
100 TABLET ORAL DAILY
Qty: 30 TABLET | Refills: 11 | Status: SHIPPED | OUTPATIENT
Start: 2024-01-11 | End: 2024-01-23 | Stop reason: DRUGHIGH

## 2024-01-11 RX ORDER — METOPROLOL TARTRATE 25 MG/1
25 TABLET, FILM COATED ORAL 2 TIMES DAILY
Qty: 180 TABLET | Refills: 1 | Status: SHIPPED | OUTPATIENT
Start: 2024-01-11 | End: 2024-01-19 | Stop reason: SDUPTHER

## 2024-01-11 RX ORDER — DULAGLUTIDE 0.75 MG/.5ML
0.75 INJECTION, SOLUTION SUBCUTANEOUS
Qty: 2 ML | Refills: 1 | Status: SHIPPED | OUTPATIENT
Start: 2024-01-11 | End: 2024-03-11 | Stop reason: SDUPTHER

## 2024-01-11 RX ORDER — FLASH GLUCOSE SCANNING READER
EACH MISCELLANEOUS
Qty: 1 EACH | Refills: 0 | Status: SHIPPED | OUTPATIENT
Start: 2024-01-11 | End: 2024-03-12 | Stop reason: WASHOUT

## 2024-01-11 ASSESSMENT — COLUMBIA-SUICIDE SEVERITY RATING SCALE - C-SSRS
1. IN THE PAST MONTH, HAVE YOU WISHED YOU WERE DEAD OR WISHED YOU COULD GO TO SLEEP AND NOT WAKE UP?: NO
2. HAVE YOU ACTUALLY HAD ANY THOUGHTS OF KILLING YOURSELF?: NO
6. HAVE YOU EVER DONE ANYTHING, STARTED TO DO ANYTHING, OR PREPARED TO DO ANYTHING TO END YOUR LIFE?: NO

## 2024-01-11 NOTE — PATIENT INSTRUCTIONS
Increase torsemide from 40mg to 100mg per day  Start once per week injection Trulicity  Freestyle Bev continuous glucose monitor sent in

## 2024-01-11 NOTE — PROGRESS NOTES
"Subjective   Patient ID: Dennis Galo is a 83 y.o. male who presents for Follow-up.    HPI   Hospitalized last month for decreased urinary output and hematuria.  Meds adjusted  Insulin stopped, glipizide stopped. Januvia started.  Torsemide dropped to 40mg. Weight up over 10 pounds since discharge. Legs swollen.  Denies dyspnea.    Review of Systems    Objective   /76   Pulse 80   Temp 36.2 °C (97.2 °F)   Ht 1.791 m (5' 10.5\")   Wt 110 kg (243 lb 6.4 oz)   SpO2 94%   BMI 34.43 kg/m²     Physical Exam  Cardiovascular:      Rate and Rhythm: Normal rate and regular rhythm.      Heart sounds: No murmur heard.  Musculoskeletal:      Right lower leg: Edema present.      Left lower leg: Edema present.         Assessment/Plan   Problem List Items Addressed This Visit             ICD-10-CM    Systolic heart failure (CMS/[facility]) I50.20    Relevant Medications    metoprolol tartrate (Lopressor) 25 mg tablet    torsemide (Demadex) 100 mg tablet    Type 2 diabetes mellitus with diabetic peripheral angiopathy without gangrene, with long-term current use of insulin (CMS/[facility]) E11.51, Z79.4    Relevant Medications    FreeStyle Bev sensor system (FreeStyle Bev 2 Sensor) kit    FreeStyle Bev reader (FreeStyle Bev 2 [place]) misc    dulaglutide (Trulicity) 0.75 mg/0.5 mL pen injector    SITagliptin phosphate (Januvia) 100 mg tablet    Other Relevant Orders    CBC    Comprehensive Metabolic Panel          Urinary stable. Follow up with Urology.  DM. Unclear why taken off insulin. Will get him CGM, start GLP-1, continue januvia.  CHF, CKD - Retaining fluid on lower torsemide. Increase to 100mg/day.  Labs today  Follow up 2 weeks    "

## 2024-01-12 DIAGNOSIS — N18.32 TYPE 2 DIABETES MELLITUS WITH STAGE 3B CHRONIC KIDNEY DISEASE, WITH LONG-TERM CURRENT USE OF INSULIN (MULTI): Primary | ICD-10-CM

## 2024-01-12 DIAGNOSIS — Z79.4 TYPE 2 DIABETES MELLITUS WITH STAGE 3B CHRONIC KIDNEY DISEASE, WITH LONG-TERM CURRENT USE OF INSULIN (MULTI): Primary | ICD-10-CM

## 2024-01-12 DIAGNOSIS — I50.22 CHRONIC SYSTOLIC HEART FAILURE (MULTI): ICD-10-CM

## 2024-01-12 DIAGNOSIS — E11.22 TYPE 2 DIABETES MELLITUS WITH STAGE 3B CHRONIC KIDNEY DISEASE, WITH LONG-TERM CURRENT USE OF INSULIN (MULTI): Primary | ICD-10-CM

## 2024-01-15 ENCOUNTER — HOME CARE VISIT (OUTPATIENT)
Dept: HOME HEALTH SERVICES | Facility: HOME HEALTH | Age: 84
End: 2024-01-15
Payer: MEDICARE

## 2024-01-15 VITALS
SYSTOLIC BLOOD PRESSURE: 140 MMHG | HEART RATE: 93 BPM | RESPIRATION RATE: 18 BRPM | TEMPERATURE: 98.2 F | DIASTOLIC BLOOD PRESSURE: 60 MMHG | OXYGEN SATURATION: 98 %

## 2024-01-15 PROCEDURE — G0299 HHS/HOSPICE OF RN EA 15 MIN: HCPCS

## 2024-01-15 ASSESSMENT — ENCOUNTER SYMPTOMS
HIGHEST PAIN SEVERITY IN PAST 24 HOURS: 0/10
APPETITE LEVEL: GOOD
LOWEST PAIN SEVERITY IN PAST 24 HOURS: 0/10
PAIN SEVERITY GOAL: 0/10
MUSCLE WEAKNESS: 1

## 2024-01-15 ASSESSMENT — PAIN SCALES - PAIN ASSESSMENT IN ADVANCED DEMENTIA (PAINAD): BREATHING: 0

## 2024-01-15 NOTE — HOME HEALTH
Came to see the patient for liu change. Liu was changed at doctor's office 1.1.24. Pt is going for suprapubic cath placement next week

## 2024-01-16 ENCOUNTER — APPOINTMENT (OUTPATIENT)
Dept: RADIOLOGY | Facility: CLINIC | Age: 84
End: 2024-01-16
Payer: MEDICARE

## 2024-01-17 DIAGNOSIS — I50.22 CHRONIC SYSTOLIC HEART FAILURE (MULTI): ICD-10-CM

## 2024-01-19 RX ORDER — METOPROLOL TARTRATE 25 MG/1
25 TABLET, FILM COATED ORAL 2 TIMES DAILY
Qty: 180 TABLET | Refills: 1 | Status: SHIPPED | OUTPATIENT
Start: 2024-01-19 | End: 2024-01-22 | Stop reason: SDUPTHER

## 2024-01-22 DIAGNOSIS — I50.22 CHRONIC SYSTOLIC HEART FAILURE (MULTI): ICD-10-CM

## 2024-01-22 RX ORDER — METOPROLOL TARTRATE 25 MG/1
25 TABLET, FILM COATED ORAL 2 TIMES DAILY
Qty: 180 TABLET | Refills: 3 | Status: SHIPPED | OUTPATIENT
Start: 2024-01-22 | End: 2024-01-23 | Stop reason: SDUPTHER

## 2024-01-23 ENCOUNTER — OFFICE VISIT (OUTPATIENT)
Dept: PRIMARY CARE | Facility: CLINIC | Age: 84
End: 2024-01-23
Payer: MEDICARE

## 2024-01-23 ENCOUNTER — LAB (OUTPATIENT)
Dept: LAB | Facility: LAB | Age: 84
End: 2024-01-23
Payer: MEDICARE

## 2024-01-23 VITALS
SYSTOLIC BLOOD PRESSURE: 129 MMHG | DIASTOLIC BLOOD PRESSURE: 79 MMHG | BODY MASS INDEX: 34.43 KG/M2 | OXYGEN SATURATION: 98 % | HEIGHT: 71 IN | TEMPERATURE: 97.7 F | HEART RATE: 74 BPM

## 2024-01-23 DIAGNOSIS — N18.32 TYPE 2 DIABETES MELLITUS WITH STAGE 3B CHRONIC KIDNEY DISEASE, WITH LONG-TERM CURRENT USE OF INSULIN (MULTI): Primary | ICD-10-CM

## 2024-01-23 DIAGNOSIS — Z79.4 TYPE 2 DIABETES MELLITUS WITH DIABETIC PERIPHERAL ANGIOPATHY WITHOUT GANGRENE, WITH LONG-TERM CURRENT USE OF INSULIN (MULTI): ICD-10-CM

## 2024-01-23 DIAGNOSIS — R33.9 URINARY RETENTION: ICD-10-CM

## 2024-01-23 DIAGNOSIS — E11.51 TYPE 2 DIABETES MELLITUS WITH DIABETIC PERIPHERAL ANGIOPATHY WITHOUT GANGRENE, WITH LONG-TERM CURRENT USE OF INSULIN (MULTI): ICD-10-CM

## 2024-01-23 DIAGNOSIS — E11.22 TYPE 2 DIABETES MELLITUS WITH STAGE 3B CHRONIC KIDNEY DISEASE, WITH LONG-TERM CURRENT USE OF INSULIN (MULTI): Primary | ICD-10-CM

## 2024-01-23 DIAGNOSIS — E11.22 TYPE 2 DIABETES MELLITUS WITH STAGE 3B CHRONIC KIDNEY DISEASE, WITH LONG-TERM CURRENT USE OF INSULIN (MULTI): ICD-10-CM

## 2024-01-23 DIAGNOSIS — I50.22 CHRONIC SYSTOLIC HEART FAILURE (MULTI): ICD-10-CM

## 2024-01-23 DIAGNOSIS — Z79.4 TYPE 2 DIABETES MELLITUS WITH STAGE 3B CHRONIC KIDNEY DISEASE, WITH LONG-TERM CURRENT USE OF INSULIN (MULTI): ICD-10-CM

## 2024-01-23 DIAGNOSIS — Z79.4 TYPE 2 DIABETES MELLITUS WITH STAGE 3B CHRONIC KIDNEY DISEASE, WITH LONG-TERM CURRENT USE OF INSULIN (MULTI): Primary | ICD-10-CM

## 2024-01-23 DIAGNOSIS — N18.32 TYPE 2 DIABETES MELLITUS WITH STAGE 3B CHRONIC KIDNEY DISEASE, WITH LONG-TERM CURRENT USE OF INSULIN (MULTI): ICD-10-CM

## 2024-01-23 LAB
ANION GAP SERPL CALC-SCNC: 15 MMOL/L (ref 10–20)
BUN SERPL-MCNC: 22 MG/DL (ref 6–23)
CALCIUM SERPL-MCNC: 8.9 MG/DL (ref 8.6–10.6)
CHLORIDE SERPL-SCNC: 93 MMOL/L (ref 98–107)
CO2 SERPL-SCNC: 33 MMOL/L (ref 21–32)
CREAT SERPL-MCNC: 1.84 MG/DL (ref 0.5–1.3)
EGFRCR SERPLBLD CKD-EPI 2021: 36 ML/MIN/1.73M*2
GLUCOSE SERPL-MCNC: 337 MG/DL (ref 74–99)
POTASSIUM SERPL-SCNC: 5.1 MMOL/L (ref 3.5–5.3)
SODIUM SERPL-SCNC: 136 MMOL/L (ref 136–145)

## 2024-01-23 PROCEDURE — 1160F RVW MEDS BY RX/DR IN RCRD: CPT | Performed by: INTERNAL MEDICINE

## 2024-01-23 PROCEDURE — 3074F SYST BP LT 130 MM HG: CPT | Performed by: INTERNAL MEDICINE

## 2024-01-23 PROCEDURE — 3078F DIAST BP <80 MM HG: CPT | Performed by: INTERNAL MEDICINE

## 2024-01-23 PROCEDURE — 36415 COLL VENOUS BLD VENIPUNCTURE: CPT

## 2024-01-23 PROCEDURE — 99215 OFFICE O/P EST HI 40 MIN: CPT | Performed by: INTERNAL MEDICINE

## 2024-01-23 PROCEDURE — 1157F ADVNC CARE PLAN IN RCRD: CPT | Performed by: INTERNAL MEDICINE

## 2024-01-23 PROCEDURE — 1125F AMNT PAIN NOTED PAIN PRSNT: CPT | Performed by: INTERNAL MEDICINE

## 2024-01-23 PROCEDURE — 1159F MED LIST DOCD IN RCRD: CPT | Performed by: INTERNAL MEDICINE

## 2024-01-23 PROCEDURE — 80048 BASIC METABOLIC PNL TOTAL CA: CPT

## 2024-01-23 PROCEDURE — 1036F TOBACCO NON-USER: CPT | Performed by: INTERNAL MEDICINE

## 2024-01-23 RX ORDER — INSULIN PUMP SYRINGE, 3 ML
EACH MISCELLANEOUS
Qty: 1 EACH | Refills: 0 | Status: SHIPPED | OUTPATIENT
Start: 2024-01-23

## 2024-01-23 RX ORDER — INSULIN GLARGINE 100 [IU]/ML
10 INJECTION, SOLUTION SUBCUTANEOUS EVERY MORNING
Qty: 6 ML | Refills: 1 | Status: SHIPPED | OUTPATIENT
Start: 2024-01-23 | End: 2024-03-14 | Stop reason: DRUGHIGH

## 2024-01-23 RX ORDER — METOPROLOL TARTRATE 25 MG/1
25 TABLET, FILM COATED ORAL 2 TIMES DAILY
Qty: 180 TABLET | Refills: 3 | Status: SHIPPED | OUTPATIENT
Start: 2024-01-23 | End: 2025-01-17

## 2024-01-23 RX ORDER — LANCETS
EACH MISCELLANEOUS
Qty: 100 EACH | Refills: 1 | Status: SHIPPED | OUTPATIENT
Start: 2024-01-23

## 2024-01-23 RX ORDER — FLASH GLUCOSE SENSOR
KIT MISCELLANEOUS
Qty: 6 EACH | Refills: 0 | Status: SHIPPED | OUTPATIENT
Start: 2024-01-23 | End: 2024-03-12 | Stop reason: WASHOUT

## 2024-01-23 RX ORDER — BLOOD SUGAR DIAGNOSTIC
STRIP MISCELLANEOUS
Qty: 100 STRIP | Refills: 1 | Status: SHIPPED | OUTPATIENT
Start: 2024-01-23

## 2024-01-23 ASSESSMENT — LIFESTYLE VARIABLES
AUDIT-C TOTAL SCORE: 0
HOW OFTEN DO YOU HAVE A DRINK CONTAINING ALCOHOL: NEVER
HOW MANY STANDARD DRINKS CONTAINING ALCOHOL DO YOU HAVE ON A TYPICAL DAY: PATIENT DOES NOT DRINK
HOW OFTEN DO YOU HAVE SIX OR MORE DRINKS ON ONE OCCASION: NEVER
SKIP TO QUESTIONS 9-10: 1

## 2024-01-23 NOTE — PATIENT INSTRUCTIONS
Decrease torsemide to 40mg/day  Start lantus 10 units/day  Start trulicity once per week  Blood work today

## 2024-01-23 NOTE — PROGRESS NOTES
"Subjective   Patient ID: Dennis Galo is a 83 y.o. male who presents for Follow-up.    HPI   Here with nephew and wife today.  Pt had many questions about his DM meds and checking sugars. He could not figure out how to use the heladio. He has the sensor applied. We went over this today and I showed him how to use it. We checked his sugar with the heladio and it was 336.  He did not yet get the trulicity, is supposed to pick it up today.  He feels he is watching his diet, but diet includes breads/sandwiches, oatmeal, milk, mashed potatoes. We discussed a diabetic diet.    Leg swelling is improved.  He saw Renal today who recommended going back to 40mg daily with torsemide due to renal function.    Seeing Urology tomorrow for catheter placement.    Review of Systems    Objective   /79   Pulse 74   Temp 36.5 °C (97.7 °F)   Ht 1.791 m (5' 10.5\")   SpO2 98%   BMI 34.43 kg/m²     Physical Exam  Cardiovascular:      Rate and Rhythm: Normal rate and regular rhythm.      Heart sounds: No murmur heard.  Pulmonary:      Effort: Pulmonary effort is normal.      Breath sounds: Normal breath sounds.   Musculoskeletal:      Right lower leg: Edema (trace) present.      Left lower leg: Edema (trace) present.   Neurological:      General: No focal deficit present.      Mental Status: He is alert.      Gait: Gait normal.         Assessment/Plan   Problem List Items Addressed This Visit             ICD-10-CM    Systolic heart failure (CMS/HCC) I50.20    Relevant Medications    torsemide 40 mg tablet    Type 2 diabetes mellitus with chronic kidney disease, with long-term current use of insulin (CMS/HCC) - Primary E11.22, Z79.4    Relevant Medications    FreeStyle glucose monitoring (OneTouch Ultra2 Meter) kit    blood sugar diagnostic (OneTouch Ultra Test) strip    lancets (OneTouch UltraSoft Lancets) misc    insulin glargine (Lantus Solostar U-100 Insulin) 100 unit/mL (3 mL) pen    Other Relevant Orders    Basic Metabolic " Panel    Type 2 diabetes mellitus with diabetic peripheral angiopathy without gangrene, with long-term current use of insulin (CMS/[facility]) E11.51, Z79.4    Relevant Medications    FreeStyle Bev sensor system (FreeStyle Bev 2 Sensor) kit          Urinary stable. Follow up with Urology.  DM. Educated on use of CGM, educated on diabetic diet, restart lantus at 10 units daily, start GLP-1, continue januvia.  CHF, CKD - Swelling much better on higher torsemide. Decrease to 40mg/day per Renal. Check BMP.    Follow up 6 weeks

## 2024-01-24 ENCOUNTER — PROCEDURE VISIT (OUTPATIENT)
Dept: UROLOGY | Facility: HOSPITAL | Age: 84
End: 2024-01-24
Payer: MEDICARE

## 2024-01-24 ENCOUNTER — HOSPITAL ENCOUNTER (OUTPATIENT)
Dept: RADIOLOGY | Facility: HOSPITAL | Age: 84
Discharge: HOME | End: 2024-01-24
Payer: MEDICARE

## 2024-01-24 ENCOUNTER — PATIENT OUTREACH (OUTPATIENT)
Dept: HEMATOLOGY/ONCOLOGY | Facility: HOSPITAL | Age: 84
End: 2024-01-24

## 2024-01-24 ENCOUNTER — TELEPHONE (OUTPATIENT)
Dept: PRIMARY CARE | Facility: CLINIC | Age: 84
End: 2024-01-24

## 2024-01-24 ENCOUNTER — HOSPITAL ENCOUNTER (OUTPATIENT)
Dept: CARDIOLOGY | Facility: HOSPITAL | Age: 84
Discharge: HOME | End: 2024-01-24
Payer: MEDICARE

## 2024-01-24 VITALS
HEIGHT: 70 IN | SYSTOLIC BLOOD PRESSURE: 134 MMHG | HEART RATE: 72 BPM | RESPIRATION RATE: 12 BRPM | BODY MASS INDEX: 32.93 KG/M2 | OXYGEN SATURATION: 96 % | TEMPERATURE: 97.9 F | DIASTOLIC BLOOD PRESSURE: 84 MMHG | WEIGHT: 230 LBS

## 2024-01-24 DIAGNOSIS — N31.9 NEUROGENIC BLADDER: ICD-10-CM

## 2024-01-24 DIAGNOSIS — R31.0 GROSS HEMATURIA: Primary | ICD-10-CM

## 2024-01-24 DIAGNOSIS — N31.2 FLACCID NEUROPATHIC BLADDER, NOT ELSEWHERE CLASSIFIED: ICD-10-CM

## 2024-01-24 DIAGNOSIS — R33.9 URINARY RETENTION: ICD-10-CM

## 2024-01-24 DIAGNOSIS — N31.9 NEUROMUSCULAR DYSFUNCTION OF BLADDER, UNSPECIFIED: ICD-10-CM

## 2024-01-24 PROCEDURE — 76942 ECHO GUIDE FOR BIOPSY: CPT | Mod: 59

## 2024-01-24 PROCEDURE — 2500000004 HC RX 250 GENERAL PHARMACY W/ HCPCS (ALT 636 FOR OP/ED): Performed by: RADIOLOGY

## 2024-01-24 PROCEDURE — 75989 ABSCESS DRAINAGE UNDER X-RAY: CPT

## 2024-01-24 PROCEDURE — 99222 1ST HOSP IP/OBS MODERATE 55: CPT | Performed by: NURSE PRACTITIONER

## 2024-01-24 PROCEDURE — 99152 MOD SED SAME PHYS/QHP 5/>YRS: CPT | Performed by: RADIOLOGY

## 2024-01-24 PROCEDURE — C1894 INTRO/SHEATH, NON-LASER: HCPCS

## 2024-01-24 PROCEDURE — 2500000004 HC RX 250 GENERAL PHARMACY W/ HCPCS (ALT 636 FOR OP/ED): Performed by: NURSE PRACTITIONER

## 2024-01-24 PROCEDURE — 75989 ABSCESS DRAINAGE UNDER X-RAY: CPT | Performed by: RADIOLOGY

## 2024-01-24 PROCEDURE — 51102 DRAIN BL W/CATH INSERTION: CPT | Performed by: UROLOGY

## 2024-01-24 PROCEDURE — 2720000007 HC OR 272 NO HCPCS

## 2024-01-24 PROCEDURE — C1769 GUIDE WIRE: HCPCS

## 2024-01-24 PROCEDURE — 99152 MOD SED SAME PHYS/QHP 5/>YRS: CPT

## 2024-01-24 PROCEDURE — 52000 CYSTOURETHROSCOPY: CPT | Performed by: UROLOGY

## 2024-01-24 PROCEDURE — 51701 INSERT BLADDER CATHETER: CPT | Performed by: RADIOLOGY

## 2024-01-24 PROCEDURE — 49465 FLUORO EXAM OF G/COLON TUBE: CPT

## 2024-01-24 PROCEDURE — C1729 CATH, DRAINAGE: HCPCS

## 2024-01-24 PROCEDURE — 2500000005 HC RX 250 GENERAL PHARMACY W/O HCPCS: Performed by: RADIOLOGY

## 2024-01-24 RX ORDER — FENTANYL CITRATE 50 UG/ML
INJECTION, SOLUTION INTRAMUSCULAR; INTRAVENOUS AS NEEDED
Status: DISCONTINUED | OUTPATIENT
Start: 2024-01-24 | End: 2024-01-25 | Stop reason: HOSPADM

## 2024-01-24 RX ORDER — CEFTRIAXONE 2 G/50ML
2 INJECTION, SOLUTION INTRAVENOUS ONCE
Status: COMPLETED | OUTPATIENT
Start: 2024-01-24 | End: 2024-01-24

## 2024-01-24 RX ORDER — SODIUM CHLORIDE 9 MG/ML
75 INJECTION, SOLUTION INTRAVENOUS CONTINUOUS
Status: DISCONTINUED | OUTPATIENT
Start: 2024-01-24 | End: 2024-01-24

## 2024-01-24 RX ORDER — CEFTRIAXONE 2 G/50ML
INJECTION, SOLUTION INTRAVENOUS CONTINUOUS PRN
Status: DISCONTINUED | OUTPATIENT
Start: 2024-01-24 | End: 2024-01-25 | Stop reason: HOSPADM

## 2024-01-24 RX ORDER — LIDOCAINE HYDROCHLORIDE 10 MG/ML
INJECTION, SOLUTION EPIDURAL; INFILTRATION; INTRACAUDAL; PERINEURAL AS NEEDED
Status: DISCONTINUED | OUTPATIENT
Start: 2024-01-24 | End: 2024-01-25 | Stop reason: HOSPADM

## 2024-01-24 RX ORDER — MIDAZOLAM HYDROCHLORIDE 1 MG/ML
INJECTION, SOLUTION INTRAMUSCULAR; INTRAVENOUS AS NEEDED
Status: DISCONTINUED | OUTPATIENT
Start: 2024-01-24 | End: 2024-01-25 | Stop reason: HOSPADM

## 2024-01-24 RX ADMIN — MIDAZOLAM HYDROCHLORIDE 0.5 MG: 1 INJECTION, SOLUTION INTRAMUSCULAR; INTRAVENOUS at 11:59

## 2024-01-24 RX ADMIN — FENTANYL CITRATE 25 MCG: 50 INJECTION, SOLUTION INTRAMUSCULAR; INTRAVENOUS at 11:59

## 2024-01-24 RX ADMIN — MIDAZOLAM HYDROCHLORIDE 0.5 MG: 1 INJECTION, SOLUTION INTRAMUSCULAR; INTRAVENOUS at 11:56

## 2024-01-24 RX ADMIN — FENTANYL CITRATE 25 MCG: 50 INJECTION, SOLUTION INTRAMUSCULAR; INTRAVENOUS at 11:56

## 2024-01-24 RX ADMIN — CEFTRIAXONE 2 G: 2 INJECTION, SOLUTION INTRAVENOUS at 10:45

## 2024-01-24 RX ADMIN — Medication 2 L/MIN: at 11:58

## 2024-01-24 RX ADMIN — CEFTRIAXONE 2 G: 2 INJECTION, SOLUTION INTRAVENOUS at 11:55

## 2024-01-24 RX ADMIN — LIDOCAINE HYDROCHLORIDE 20 ML: 10 INJECTION, SOLUTION EPIDURAL; INFILTRATION; INTRACAUDAL; PERINEURAL at 12:02

## 2024-01-24 ASSESSMENT — ENCOUNTER SYMPTOMS
ENDOCRINE NEGATIVE: 1
RESPIRATORY NEGATIVE: 1
CARDIOVASCULAR NEGATIVE: 1
ALLERGIC/IMMUNOLOGIC NEGATIVE: 1
GASTROINTESTINAL NEGATIVE: 1
CONSTITUTIONAL NEGATIVE: 1
EYES NEGATIVE: 1
PSYCHIATRIC NEGATIVE: 1
HEMATOLOGIC/LYMPHATIC NEGATIVE: 1
NEUROLOGICAL NEGATIVE: 1

## 2024-01-24 ASSESSMENT — PAIN SCALES - GENERAL: PAINLEVEL_OUTOF10: 6

## 2024-01-24 ASSESSMENT — PAIN - FUNCTIONAL ASSESSMENT: PAIN_FUNCTIONAL_ASSESSMENT: 0-10

## 2024-01-24 NOTE — POST-PROCEDURE NOTE
Interventional Radiology Brief Postprocedure Note    Attending: Emeka Huitron MD      Assistant: none    Diagnosis: Urinary retention    Description of procedure: Ultrasound and fluoro guided placement of a 16F suprapubic catheter. The 16F pigtail catheter can be exchanged in office to a standard SPT following tract maturation (approximately 1 month).     Anesthesia:  Moderate sedation    Complications: None    Estimated Blood Loss: minimal    Medications (Filter: Administrations occurring from 1225 to 1225 on 01/24/24) As of 01/24/24 1225      None          No specimens collected      See detailed result report with images in PACS.    The patient tolerated the procedure well without incident or complication and is in stable condition.

## 2024-01-24 NOTE — TELEPHONE ENCOUNTER
Left message for teddy to call to get results below:     ----- Message from Triston Samson MD sent at 1/15/2024  3:29 PM EST -----  Labs all ok except blood sugar high. Continue same plan from the visit.

## 2024-01-24 NOTE — H&P
History Of Present Illness  Dennis Galo is a 83 y.o. male presenting with hx of recurrent urinary retention, significant obstructive symptoms, neurogenic bladder s/p liu catheter placement.     Past Medical History  He has a past medical history of CAD (coronary artery disease), CHF (congestive heart failure) (CMS/HCC), Chronic indwelling Liu catheter, Chronic renal disease, stage III (CMS/HCC), Diabetes mellitus (CMS/HCC), Eczema, Hiatal hernia, Hypertension, Infarction of spleen, Intracardiac thrombosis, not elsewhere classified, Old myocardial infarction, Prostate cancer (CMS/HCC), Pulmonary nodules, Renal cyst, and TIA (transient ischemic attack).    Surgical History  He has a past surgical history that includes Pilonidal cyst drainage (07/26/2018); Cardiac catheterization (2013); Prostate surgery; and Coronary artery bypass graft.     Social History  He reports that he quit smoking about 16 years ago. His smoking use included cigarettes. He has never used smokeless tobacco. He reports that he does not drink alcohol and does not use drugs.    Family History  No family history on file.     Allergies  Patient has no known allergies.    Home Medications  Current Outpatient Medications on File Prior to Encounter   Medication Sig Dispense Refill    ammonium lactate (Amlactin) 12 % cream Apply 1 Application topically if needed for dry skin.      aspirin 81 mg chewable tablet Chew 1 tablet (81 mg) once daily.      atorvastatin (Lipitor) 80 mg tablet Take 1 tablet (80 mg) by mouth once daily.      blood sugar diagnostic (OneTouch Ultra Test) strip Use once daily 100 strip 1    dulaglutide (Trulicity) 0.75 mg/0.5 mL pen injector Inject 0.75 mg under the skin 1 (one) time per week. 2 mL 1    FreeStyle glucose monitoring (OneTouch Ultra2 Meter) kit As directed 1 each 0    FreeStyle Bev reader (FreeStyle Bev 2 [place]) misc Use as instructed 1 each 0    FreeStyle Bev sensor system (FreeStyle Bev 2 Sensor)  "kit Use as instructed 6 each 0    insulin glargine (Lantus Solostar U-100 Insulin) 100 unit/mL (3 mL) pen Inject 10 Units under the skin once daily in the morning. Take as directed per insulin instructions. 6 mL 1    gabapentin (Neurontin) 100 mg capsule Take 1 capsule (100 mg) by mouth 3 times a day.      isosorbide mononitrate ER (Imdur) 30 mg 24 hr tablet Take 1 tablet (30 mg) by mouth once daily.      lancets (OneTouch UltraSoft Lancets) misc Use once daily 100 each 1    leuprolide acetate (LEUPROLIDE, BULK, MISC) 5 mg. Lupron 5 MG      metoprolol tartrate (Lopressor) 25 mg tablet Take 1 tablet (25 mg) by mouth 2 times a day. 180 tablet 3    pen needle, diabetic 32 gauge x 5/32\" needle Use one daily 100 each 2    SITagliptin phosphate (Januvia) 100 mg tablet Take 1 tablet (100 mg) by mouth once daily. 90 tablet 1    tamsulosin (Flomax) 0.4 mg 24 hr capsule Take 1 capsule (0.4 mg) by mouth once daily. Do not start before December 17, 2023. (Patient not taking: Reported on 1/24/2024) 30 capsule 0    torsemide 40 mg tablet Take 40 mg by mouth once daily. 30 tablet 3    [DISCONTINUED] FreeStyle Bev sensor system (FreeStyle Bev 2 Sensor) kit Use as instructed 1 each 0    [DISCONTINUED] metoprolol tartrate (Lopressor) 25 mg tablet Take 1 tablet (25 mg) by mouth 2 times a day. 180 tablet 1    [DISCONTINUED] metoprolol tartrate (Lopressor) 25 mg tablet Take 1 tablet (25 mg) by mouth 2 times a day. 180 tablet 1    [DISCONTINUED] metoprolol tartrate (Lopressor) 25 mg tablet Take 1 tablet (25 mg) by mouth 2 times a day. 180 tablet 3    [DISCONTINUED] torsemide (Demadex) 100 mg tablet Take 1 tablet (100 mg) by mouth once daily. 30 tablet 11    [DISCONTINUED] torsemide 40 mg tablet Take 40 mg by mouth once daily. 30 tablet 11     No current facility-administered medications on file prior to encounter.          Inpatient Medications:  Scheduled medications   Medication Dose Route Frequency    cefTRIAXone  2 g intravenous " Once     PRN medications   Medication     Continuous Medications   Medication Dose Last Rate    sodium chloride 0.9%  75 mL/hr           Review of Systems   Constitutional: Negative.    HENT: Negative.     Eyes: Negative.    Respiratory: Negative.     Cardiovascular: Negative.    Gastrointestinal: Negative.    Endocrine: Negative.    Genitourinary: Negative.    Musculoskeletal:         +buttocks pain 3/10   Skin: Negative.    Allergic/Immunologic: Negative.    Neurological: Negative.    Hematological: Negative.    Psychiatric/Behavioral: Negative.            Physical Exam  Constitutional:       General: He is awake. He is not in acute distress.     Appearance: He is not ill-appearing or toxic-appearing.   HENT:      Nose: Nose normal.      Mouth/Throat:      Mouth: Mucous membranes are moist.      Pharynx: Oropharynx is clear.   Eyes:      Extraocular Movements: Extraocular movements intact.      Conjunctiva/sclera: Conjunctivae normal.   Cardiovascular:      Rate and Rhythm: Normal rate and regular rhythm.      Pulses:           Radial pulses are 2+ on the right side and 2+ on the left side.        Dorsalis pedis pulses are 1+ on the right side and 1+ on the left side.      Heart sounds: Normal heart sounds. No murmur heard.     Comments: +erythema to bilateral lower extremities without warmth  Pulmonary:      Effort: Pulmonary effort is normal.      Breath sounds: Normal breath sounds and air entry.   Abdominal:      General: Bowel sounds are normal. There is no distension.      Palpations: Abdomen is soft.      Tenderness: There is no abdominal tenderness.   Musculoskeletal:      Cervical back: Normal range of motion and neck supple.      Right lower le+ Edema present.      Left lower le+ Edema present.   Skin:     General: Skin is warm and dry.   Neurological:      General: No focal deficit present.      Mental Status: He is alert and oriented to person, place, and time. Mental status is at baseline.      " GCS: GCS eye subscore is 4. GCS verbal subscore is 5. GCS motor subscore is 6.   Psychiatric:         Mood and Affect: Mood normal.         Behavior: Behavior normal. Behavior is cooperative.         Thought Content: Thought content normal.         Judgment: Judgment normal.        Sedation Plan    ASA 3     Mallampati class: III.         Last Recorded Vitals  Blood pressure (!) 137/91, pulse 89, temperature 36.6 °C (97.9 °F), temperature source Temporal, resp. rate 16, height 1.778 m (5' 10\"), weight 104 kg (230 lb), SpO2 93 %.    Relevant Results    Labs    CBC:   Recent Labs     01/11/24  1530 12/16/23  0607 12/15/23  0600 12/13/23  1820 12/12/23  0704 12/11/23  0644   WBC 13.0* 7.0 7.5 11.9* 10.3 8.3   HGB 13.4* 11.3* 10.6* 12.5* 12.3* 12.5*   HCT 40.8* 34.6* 32.2* 37.5* 37.8* 38.4*    226 208 216 204 226   MCV 91 94 93 93 93 93     BMP/CMP:   Recent Labs     01/23/24  1553 01/11/24  1530 01/03/24  1401 12/16/23  0607 12/15/23  0601 12/14/23  1408 12/13/23  1957 12/13/23  1820 12/10/23  0722 12/09/23  1327 10/10/23  1520 10/10/23  1520 05/16/23  1408 03/13/23  1345    134* 137 133* 133* 131*  --  126*   < > 136   < > 141 138 135*   K 5.1 4.1 3.9 4.4 3.9 4.1   < > 6.9*   < > 3.8   < > 4.2 4.1 4.8   CL 93* 95* 97* 99 99 96*  --  92*   < > 99   < > 101 99 100   BUN 22 24* 24* 34* 38* 43*  --  43*   < > 27*   < > 26* 22 12   CREATININE 1.84* 1.96* 1.72* 1.61* 1.82* 2.20*  --  2.89*   < > 1.69*   < > 1.64* 1.44* 1.06   CO2 33* 31 30 28 27 26  --  26   < > 31   < > 32 31 30   CALCIUM 8.9 8.5* 8.9 8.2* 7.8* 8.4*  --  8.4*   < > 8.9   < > 9.2 8.7 8.9   PROT  --  6.4  --   --   --   --   --  6.7  --  6.3*  --  6.7 6.4 6.0*   BILITOT  --  0.8  --   --   --   --   --  0.9  --  0.8  --  0.6 0.7 0.6   ALKPHOS  --  86  --   --   --   --   --  66  --  80  --  67 73 66   ALT  --  8*  --   --   --   --   --  8*  --  10  --  12 6* 7*   AST  --  11  --   --   --   --   --  31  --  13  --  13 12 12   GLUCOSE 337* 483* " "383* 151* 134* 189*  --  166*   < > 182*   < > 81 75 78    < > = values in this interval not displayed.      Lipid Panel:   Recent Labs     08/03/22  1348 08/24/21  1323 06/12/20  1323 11/14/19  1323 03/22/19  1412 07/26/18  1543   CHOL 87 97 114 114 99 157   HDL 35.5* 35.4* 40.7 40.5 32.8* 48.5   CHHDL 2.5 2.7 2.8 2.8 3.0 3.2   VLDL 21 32 38 32 25 36   TRIG 105 160* 190* 159* 125 182*     Cardiac       No lab exists for component: \"CK\", \"CKMBP\"   Hemoglobin A1C:   Recent Labs     12/10/23  0722 10/10/23  1520 03/13/23  1345 08/03/22  1348 03/29/22  1311 11/19/21  1457 08/24/21  1323 02/04/21  1328 09/28/20  1359 06/12/20  1323 11/14/19  1323 03/22/19  1412   HGBA1C 7.3* 7.1* 5.9* 7.9* 9.2* 8.9* 8.7* 9.6 8.1 7.9 7.2 8.3     TSH/ Free T4:   Recent Labs     02/07/23  1340   TSH 1.24     Iron:   Recent Labs     08/09/22  1333 01/16/19  1615 01/02/19  1351 12/22/18  1105   FERRITIN 76  --   --   --    TIBC 285  --   --   --    IRONSAT 18*  --   --   --    BNP  --  281* 248* 321*     Coag:     ABO: No results found for: \"ABO\"    Past Cardiology Tests (Last 3 Years):  EKG:  No results found for this or any previous visit (from the past 4464 hour(s)).  Echo:  Results for orders placed during the hospital encounter of 12/09/23    Transthoracic Echo (TTE) Complete    Narrative  [facility], [address] [place] [address] [id number] [phone] and  Fax [phone]    TRANSTHORACIC ECHOCARDIOGRAM REPORT      Patient Name:      BEVERLY A JENNY Petersen Physician:    [id number] Ayush Kaur MD  Study Date:        12/11/2023          Ordering Provider:    [id number] HAYLIE URIBE  MRN/PID:           [id number]            Fellow:  Accession#:        [id number]        Nurse:  Date of Birth/Age: 1940 / 83 years Sonographer:          ELROY Parson RDCS  Gender:            M                   Additional Staff:  Height:            177.80 cm           Admit Date:           12/9/2023  Weight:            103.87 kg           " Admission Status:     Observation -  Priority discharge  BSA:               2.21 m2             Encounter#:           [id number]  Department Location:  [facility]  Blood Pressure: 139 /65 mmHg    Study Type:    TRANSTHORACIC ECHO (TTE) COMPLETE  Diagnosis/ICD: Chronic systolic (congestive) heart failure (CHF)-I50.22  Indication:    Congestive Heart Failure  CPT Code:      Echo Complete w Full Doppler-[id number]    Patient History:  Pertinent History: CAD and CHF. DM.    Study Detail: The following Echo studies were performed: 2D, M-Mode, Doppler and  color flow. Technically challenging study due to body habitus and  prominent lung artifact.      PHYSICIAN INTERPRETATION:  Left Ventricle: The left ventricular systolic function is mildly to moderately decreased, with an estimated ejection fraction of 40-45%. Wall motion is abnormal. The left ventricular cavity size is normal. Left ventricular diastolic filling was indeterminate. There is evidence of a small to moderate apical myocardial infarction.  Left Atrium: The left atrium is mildly dilated.  Right Ventricle: The right ventricle is normal in size. There is normal right ventricular global systolic function.  Right Atrium: The right atrium is moderately to severely dilated.  Aortic Valve: The aortic valve is trileaflet. There is no evidence of aortic valve regurgitation. The peak instantaneous gradient of the aortic valve is 3.6 mmHg. The mean gradient of the aortic valve is 2.0 mmHg.  Mitral Valve: The mitral valve is mild to moderately thickened. There is mild to moderate mitral valve regurgitation.  Tricuspid Valve: The tricuspid valve is structurally normal. There is trace to mild tricuspid regurgitation. The right ventricular systolic pressure is unable to be estimated.  Pulmonic Valve: The pulmonic valve is structurally normal. There is trace pulmonic valve regurgitation.  Pericardium: There is no pericardial effusion noted.  Aorta: The aortic root is normal.  There is no dilatation of the ascending aorta. There is no dilatation of the aortic root.  Systemic Veins: The inferior vena cava appears mildly dilated.      CONCLUSIONS:  1. Left ventricular systolic function is mildly to moderately decreased with a 40-45% estimated ejection fraction.  2. There is a small to moderate apical myocardial infarction.  3. The right atrium is moderately to severely dilated.  4. Mild to moderate mitral valve regurgitation.    QUANTITATIVE DATA SUMMARY:  2D MEASUREMENTS:  Normal Ranges:  Ao Root d:     3.40 cm   (2.0-3.7cm)  LAs:           4.30 cm   (2.7-4.0cm)  IVSd:          1.14 cm   (0.6-1.1cm)  LVPWd:         1.16 cm   (0.6-1.1cm)  LVIDd:         4.87 cm   (3.9-5.9cm)  LVIDs:         3.07 cm  LV Mass Index: 95.5 g/m2  LV % FS        37.0 %    LA VOLUME:  Normal Ranges:  LA Vol A4C:        36.9 ml    (22+/-6mL/m2)  LA Vol A2C:        61.8 ml  LA Vol BP:         49.5 ml  LA Vol Index A4C:  16.7ml/m2  LA Vol Index A2C:  28.0 ml/m2  LA Vol Index BP:   22.4 ml/m2  LA Area A4C:       16.0 cm2  LA Area A2C:       20.0 cm2  LA Major Axis A4C: 5.9 cm  LA Major Axis A2C: 5.5 cm  LA Volume Index:   21.0 ml/m2    RA VOLUME BY A/L METHOD:  Normal Ranges:  RA Area A4C: 17.0 cm2    M-MODE MEASUREMENTS:  Normal Ranges:  Ao Root: 4.10 cm (2.0-3.7cm)  LAs:     4.90 cm (2.7-4.0cm)    AORTA MEASUREMENTS:  Normal Ranges:  Asc Ao, d: 3.40 cm (2.1-3.4cm)    LV SYSTOLIC FUNCTION BY 2D PLANIMETRY (MOD):  Normal Ranges:  EF-A4C View: 45.4 % (>=55%)  EF-A2C View: 32.6 %  EF-Biplane:  39.8 %    LV DIASTOLIC FUNCTION:  Normal Ranges:  MV Peak E:    0.76 m/s (0.7-1.2 m/s)  MV Peak A:    0.44 m/s (0.42-0.7 m/s)  E/A Ratio:    1.73     (1.0-2.2)  MV lateral e' 0.08 m/s  MV medial e'  0.08 m/s    MITRAL VALVE:  Normal Ranges:  MV DT: 191 msec (150-240msec)    AORTIC VALVE:  Normal Ranges:  AoV Vmax:                0.96 m/s (<=1.7m/s)  AoV Peak PG:             3.6 mmHg (<20mmHg)  AoV Mean P.0 mmHg  (1.7-11.5mmHg)  LVOT Max Pal:            0.85 m/s (<=1.1m/s)  AoV VTI:                 19.70 cm (18-25cm)  LVOT VTI:                19.80 cm  LVOT Diameter:           2.30 cm  (1.8-2.4cm)  AoV Area, VTI:           4.18 cm2 (2.5-5.5cm2)  AoV Area,Vmax:           3.72 cm2 (2.5-4.5cm2)  AoV Dimensionless Index: 1.01      RIGHT VENTRICLE:  RV Basal 3.81 cm  TAPSE:   23.9 mm  RV s'    0.13 m/s    TRICUSPID VALVE/RVSP:  Normal Ranges:  IVC Diam: 2.20 cm    PULMONIC VALVE:  Normal Ranges:  PV Max Pal: 0.9 m/s  (0.6-0.9m/s)  PV Max PG:  3.0 mmHg      [id number] Ayush Kaur MD  Electronically signed on 12/11/2023 at 11:27:48 AM        ** Final **    Ejection Fractions:  LV biplane EF   Date/Time Value Ref Range Status   12/11/2023 11:16 AM 40       Cath:  No results found for this or any previous visit.    Stress Test:  No results found for this or any previous visit.    Cardiac Imaging:  No results found for this or any previous visit.        === 12/09/23 ===    CT CHEST HIGH RESOLUTION    - Impression -  1.  Improvement of pulmonary nodules presumably from metastatic  disease.    Signed by: Romario Nguyen 12/10/2023 1:24 PM  Dictation workstation:   [id number]     Assessment/Plan  Assessment/Plan   Active Problems:  There are no active Hospital Problems.        hx of recurrent urinary retention, significant obstructive symptoms, neurogenic bladder s/p liu catheter placement  -here for suprapubic catheter placement with Dr. Huitron on 1/24/24  -rocephin 2gm IV once prior to procedure       I spent 30 minutes in the professional and overall care of this patient.      Chilo Perez, APRN-CNP, DNP

## 2024-01-24 NOTE — PROGRESS NOTES
01/24/2024 1606: Asked by Dr. Quintanilla to arrange for patient to see med/onc for his history of prostate cancer. Patient was in the office with him today, my number provided for him to call, yet have not heard from him, called both patients numbers [phone], left a message, also called [phone], was going to leave message but phone disconnected. Will attempt to reach out to patient again tomorrow. Bruno Ortiz RN.

## 2024-01-24 NOTE — PROGRESS NOTES
HPI  83-year-old male with recurrent episodes of urinary retention, significant obstructive and irritative symptoms at baseline. Significant preoperative urge and urge incontinence. Tried and failed InterStim x2 back in 2019. Does not seem like he ever had a prostate procedure or urodynamics however. He does have insulin-dependent diabetes and neuropathy and nephropathy.    UDS 4/20/22 with DO, normal compliance, slightly diminshed capacity, moderate pressure (71 cm H20) bladder contraction with minimal flow c/w obstruction.    Now s/p HoLEP, 100U botox 6/17/22.     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 250cc instilled, no urge to void   6/21/22 - returns for PVR check, 34cc. voiding, issues with leakage.   7/5/22 - returns for 2 week follow up. PVR 93cc, voided a couple hours ago. still with signfiicant UUI. preop was using 4-5 diapers a day, now upwards of 8-10. intermittent blood in the urine. no DANA. started on myrbetriq 50mg daily  8/8/22 - UUI improving, now about equal to preop levels.    Prostate Cancer staging:  CT A/P 7/2022 - multiple lesions in the pelvic bones, lungs  CT chest - multiple small nodules bilatearlly.  NM bone scan - abnormal uptake in the pelvis    has seen rad onc and med onc. started on ADT + enzalutamide. No plan for radiation therapy at this time.    9/20/22 -returns for follow-up. Urge and urge incontinence is about the same. PVR 18 cc.     11/14/22- Presents today for cystoscopy/ botox 200U. Urge and urge incontinence is the same. Took antibiotic this morning.     11/29/22 - PVR 81cc. Still has nocturia 3-4x, urinary symptoms are similar to pre Botox, no improvement. Will start on Myrbetriq 50mg.    12/28/22 - seen in follow up. no better on myrbetriq. continues on hormonal therapy.     Hospitalized 2/7/2023 through 2/9/2023 with COVID, urinary retention, encephalopathy. A catheter was placed and a voiding trial was  attempted but failed.    2/20/23 -successful T OV today.    3/6/23 - seen for PVR check, 450+cc. leaking continuously. comfortable    4/17/23- Patient presents for a follow up on urinary retention. Patient still has urgency and incontinence.   Patient had stroke 10 years ago. here for cysto, urethra was widely opened. Discussed his atonic bladder - discussed self catheterization, patient declined. Set up HH cath changes, patient follow up prn.    UDS 4/2023 -  atonic bladder, complete retention    US Renal 12/14/23: Personally reviewed.  IMPRESSION:  1. Bilateral renal cysts; no hydronephrosis.  2. Bladder wall thickening and trabeculation.    1/4/24 - seen today for fu to recent ed admission for UTI/LAURA. US showed bilateral renal cysts with no hydro. Had gross hematuria, now resolved, we discussed the need for a cysto at some point. Today, reports issues with liu, does not appear to be in bladder.    1/24/24 - here today for cysto due to recent gross hematuria. SPT was placed earlier today    Lab Results   Component Value Date    PSA 4.83 (H) 12/10/2023    PSA 0.30 12/20/2022    PSA 0.36 11/21/2022    PSA 0.63 10/24/2022    PSA 1.70 09/23/2022         Current Medications:  Current Outpatient Medications   Medication Sig Dispense Refill    ammonium lactate (Amlactin) 12 % cream Apply 1 Application topically if needed for dry skin.      aspirin 81 mg chewable tablet Chew 1 tablet (81 mg) once daily.      atorvastatin (Lipitor) 80 mg tablet Take 1 tablet (80 mg) by mouth once daily.      blood sugar diagnostic (OneTouch Ultra Test) strip Use once daily 100 strip 1    dulaglutide (Trulicity) 0.75 mg/0.5 mL pen injector Inject 0.75 mg under the skin 1 (one) time per week. 2 mL 1    FreeStyle glucose monitoring (OneTouch Ultra2 Meter) kit As directed 1 each 0    FreeStyle Bev reader (FreeStyle Bev 2 [place]) misc Use as instructed 1 each 0    FreeStyle Bev sensor system (FreeStyle Bev 2 Sensor) kit Use as  "instructed 6 each 0    gabapentin (Neurontin) 100 mg capsule Take 1 capsule (100 mg) by mouth 3 times a day.      insulin glargine (Lantus Solostar U-100 Insulin) 100 unit/mL (3 mL) pen Inject 10 Units under the skin once daily in the morning. Take as directed per insulin instructions. 6 mL 1    isosorbide mononitrate ER (Imdur) 30 mg 24 hr tablet Take 1 tablet (30 mg) by mouth once daily.      lancets (OneTouch UltraSoft Lancets) misc Use once daily 100 each 1    leuprolide acetate (LEUPROLIDE, BULK, MISC) 5 mg. Lupron 5 MG      metoprolol tartrate (Lopressor) 25 mg tablet Take 1 tablet (25 mg) by mouth 2 times a day. 180 tablet 3    pen needle, diabetic 32 gauge x 5/32\" needle Use one daily 100 each 2    SITagliptin phosphate (Januvia) 100 mg tablet Take 1 tablet (100 mg) by mouth once daily. 90 tablet 1    tamsulosin (Flomax) 0.4 mg 24 hr capsule Take 1 capsule (0.4 mg) by mouth once daily. Do not start before December 17, 2023. (Patient not taking: Reported on 1/24/2024) 30 capsule 0    torsemide 40 mg tablet Take 40 mg by mouth once daily. 30 tablet 3     No current facility-administered medications for this visit.     Facility-Administered Medications Ordered in Other Visits   Medication Dose Route Frequency Provider Last Rate Last Admin    cefTRIAXone (Rocephin) IV in dextrose 5%    Continuous PRN Emeka Huitron MD   2 g at 01/24/24 1155    fentaNYL PF (Sublimaze) injection    PRN Emeka Huitron MD   25 mcg at 01/24/24 1159    lidocaine PF (Xylocaine) 10 mg/mL (1 %) injection    PRN Emeka Huitron MD   20 mL at 01/24/24 1202    midazolam (Versed) injection    PRN Emeka Huitron MD   0.5 mg at 01/24/24 1159    oxygen (O2) therapy    Continuous PRN Emeka Huitron ,000 mL/hr at 01/24/24 1158 2 L/min at 01/24/24 1158        Active Problems:  Dennis Galo is a 83 y.o. male with the following Problems and Medications.  Patient Active Problem List   Diagnosis    Atonic bladder    " Balanitis    Bilateral edema of lower extremity    BPH (benign prostatic hyperplasia)    CAD (coronary artery disease)    Chronic low back pain    CKD (chronic kidney disease), stage III (CMS/[facility])    Diabetic nephropathy (CMS/[facility])    Dribbling of urine    Gait instability    Gout    Hypertension associated with diabetes (CMS/HCC)    Neurogenic bladder    Neuropathic pain    Primary osteoarthritis of right hip    Prostate cancer (CMS/[facility])    S/P CABG (coronary artery bypass graft)    Severe obesity (BMI 35.0-39.9) with comorbidity (CMS/[facility])    Systolic heart failure (CMS/HCC)    Urge incontinence of urine    Urinary retention    Venous stasis dermatitis of both lower extremities    Wound of skin    Overflow incontinence of urine    Type 2 diabetes mellitus with chronic kidney disease, with long-term current use of insulin (CMS/HCC)    Type 2 diabetes mellitus with diabetic peripheral angiopathy without gangrene, with long-term current use of insulin (CMS/HCC)    Bronchiectasis, uncomplicated (CMS/HCC)    Lower urinary tract infection    LAURA (acute kidney injury) (CMS/HCC)     Current Outpatient Medications   Medication Sig Dispense Refill    ammonium lactate (Amlactin) 12 % cream Apply 1 Application topically if needed for dry skin.      aspirin 81 mg chewable tablet Chew 1 tablet (81 mg) once daily.      atorvastatin (Lipitor) 80 mg tablet Take 1 tablet (80 mg) by mouth once daily.      blood sugar diagnostic (OneTouch Ultra Test) strip Use once daily 100 strip 1    dulaglutide (Trulicity) 0.75 mg/0.5 mL pen injector Inject 0.75 mg under the skin 1 (one) time per week. 2 mL 1    FreeStyle glucose monitoring (OneTouch Ultra2 Meter) kit As directed 1 each 0    FreeStyle Bev reader (FreeStyle Bev 2 [place]) misc Use as instructed 1 each 0    FreeStyle Bev sensor system (FreeStyle Bev 2 Sensor) kit Use as instructed 6 each 0    gabapentin (Neurontin) 100 mg capsule Take 1 capsule (100 mg) by mouth 3 times a day.   "    insulin glargine (Lantus Solostar U-100 Insulin) 100 unit/mL (3 mL) pen Inject 10 Units under the skin once daily in the morning. Take as directed per insulin instructions. 6 mL 1    isosorbide mononitrate ER (Imdur) 30 mg 24 hr tablet Take 1 tablet (30 mg) by mouth once daily.      lancets (OneTouch UltraSoft Lancets) misc Use once daily 100 each 1    leuprolide acetate (LEUPROLIDE, BULK, MISC) 5 mg. Lupron 5 MG      metoprolol tartrate (Lopressor) 25 mg tablet Take 1 tablet (25 mg) by mouth 2 times a day. 180 tablet 3    pen needle, diabetic 32 gauge x 5/32\" needle Use one daily 100 each 2    SITagliptin phosphate (Januvia) 100 mg tablet Take 1 tablet (100 mg) by mouth once daily. 90 tablet 1    tamsulosin (Flomax) 0.4 mg 24 hr capsule Take 1 capsule (0.4 mg) by mouth once daily. Do not start before December 17, 2023. (Patient not taking: Reported on 1/24/2024) 30 capsule 0    torsemide 40 mg tablet Take 40 mg by mouth once daily. 30 tablet 3     No current facility-administered medications for this visit.     Facility-Administered Medications Ordered in Other Visits   Medication Dose Route Frequency Provider Last Rate Last Admin    cefTRIAXone (Rocephin) IV in dextrose 5%    Continuous PRN Emeka Huitron MD   2 g at 01/24/24 1155    fentaNYL PF (Sublimaze) injection    PRN Emeka Huitron MD   25 mcg at 01/24/24 1159    lidocaine PF (Xylocaine) 10 mg/mL (1 %) injection    PRN Emeka Huitron MD   20 mL at 01/24/24 1202    midazolam (Versed) injection    PRN Emeka Huitron MD   0.5 mg at 01/24/24 1159    oxygen (O2) therapy    Continuous PRN Emeka Huitron ,000 mL/hr at 01/24/24 1158 2 L/min at 01/24/24 1158       PMH:  Past Medical History:   Diagnosis Date    CAD (coronary artery disease)     CHF (congestive heart failure) (CMS/[facility])     EF 40-45%    Chronic indwelling [place] catheter     Chronic renal disease, stage III (CMS/[facility])     Diabetes mellitus (CMS/[facility])     Eczema     " Hiatal hernia     Hypertension     Infarction of spleen     Intracardiac thrombosis, not elsewhere classified     Apical mural thrombus    Old myocardial infarction     Prostate cancer (CMS/HCC)     Pulmonary nodules     Renal cyst     bilateral    TIA (transient ischemic attack)        PSH:  Past Surgical History:   Procedure Laterality Date    CARDIAC CATHETERIZATION  2013    2 stents    CORONARY ARTERY BYPASS GRAFT      PILONIDAL CYST DRAINAGE  2018    Pilonidal Cyst Resection    PROSTATE SURGERY         FMH:  No family history on file.    SHx:  Social History     Tobacco Use    Smoking status: Former     Types: Cigarettes     Quit date:      Years since quittin.0    Smokeless tobacco: Never   Vaping Use    Vaping Use: Never used   Substance Use Topics    Alcohol use: Never    Drug use: Never       Allergies:  No Known Allergies    Patient ID: Dennis Galo is a 83 y.o. male.    Cystoscopy    Date/Time: 2024 2:14 PM    Performed by: Castro Quintanilla MD  Authorized by: Castro Quintanilla MD    Procedure - Bladder Cystoscopy:     Procedure details: cystoscopy    Procedure:  After informed consent was obtained, the patient was taken to the procedure room for cystoscopy due to gross hematuria.     Cystoscopy     Procedure Note:    A sterile prep and drape was performed in standard fashion. Lidocaine was used for topical anesthesia. A flexible cystoscope was inserted into the urethra without difficulty revealing normal urethra.     The prostate was wide open, no other obvious masses or tumors.    Then entered the bladder revealing bladder mucosa with no erythematous patches or plaques, foreign bodies, stones or papillary lesions. The ureteral orifices were visualized bilaterally. These were orthotopic in location and effluxing clear urine. No masses were seen on retroflexion. SPT present.    Post-Procedure:   The cystoscope was removed. The vital signs were stable . The patient tolerated the  procedure well. There were no complications.     Assessment/Plan  Cysto today was normal. I believe this reasonably completes his hematuria work-up. Oncology information given today. He will contact to schedule. SPT was just placed, reviewed bathing instructions. He will follow up in 1mo for next SPT change, or sooner prn. After that, can switch to HH changes.     Scribe Attestation  By signing my name below, I, Michelle Alfonso , Scribe   attest that this documentation has been prepared under the direction and in the presence of Castro Quintanilla MD.

## 2024-01-24 NOTE — DISCHARGE INSTRUCTIONS
Suprapubic Tube- Patient and Family Education    Your suprapubic catheter is a soft tube placed by the Interventional Radiologist to help you  empty your bladder. This tube may be permanent, or stay in place until you are able to urinate  on your own. These instructions will provide a guideline to decrease the risk of complications  while caring for your catheter.    Sedation:  ? The sedation you receive for your procedure is still in your system.  ? Take it easy the rest of the day, do not drive.  ? Avoid drinking alcohol.  ? You will need an adult escort to drive you home.    Activity:  ? Resume your regular activity in 24 hours, unless you have been restricted for another  reason.    Care of the Suprapubic Tube:  ? You may take the dressing off in two days and take a shower. Keep the water from  directly pouring over the tube.  ? Wash your hands with antibacterial soap and water.  ? Take off the old bandage being careful not to pull on the tube.  ? Pull the bandage in a plastic bag and dispose of it in the wastebasket.  ? Wash around the site of the catheter gently with antibacterial soap and water.  ? Apply a clean bandage, careful not to touch the side of the bandage that will touch the  skin.  ? Tape the bandage to your skin.  ? The drainage bag can be worn on your leg or pinned to your clothing.  ? At night you can attach a bigger bag and attach to the side of your bed.  ? Empty the bag into the toilet every 2 to 3 hours.    Care after the procedure:  ? You may experience some discomfort after the procedure. This is normal.  ? You may take over-the-counter medicines such as Tylenol or Advil, unless you are  restricted from these medications.    Sedation:  If you received medication for sedation, it will be active in your body for approximately 24  hours. So you may feel a little sleepy. Therefore, for the next 24 hours:  ? DO NOT drive a car, operate machinery or power tools. It is recommended that a    responsible adult be with you for the first 24 hours.  ? DO NOT drink any alcoholic beverages or take any non-prescriptive medications that   contain alcohol.  ? DO NOT make any important decisions or sign any legal/important documents.    Call your Doctor if you have:  ? Fever or chills.  ? Urine stops draining into the bag.  ? Bleeding, redness, or swelling at the procedure site.    Call the Abdominal Imaging & Intervention Department when:  ? If the tube falls out.  ? Your tube may need to be increased in size.     How to Reach your Doctor:    Call [phone] with problems or questions.     This info is a general resource. It is not meant to replace your health care provider’s advice.  Ask your doctor or health care team any questions. Always follow their instructions.

## 2024-01-24 NOTE — Clinical Note
Patient Clipped and Prepped: abdomen. Prepped with ChloraPrep, a minimum of 3 minute dry time, longer if needed, no pooling noted, patient draped in sterile fashion.

## 2024-01-24 NOTE — Clinical Note
dry and intact. 10 yo female with PMH of asthma presents to the ED c/o sore throat, fever (103.5 F at home), body aches, runny nose, and productive cough with green sputum x 3 days.  Patient has not taken anything for fever. Patient denies recent asthma exacerbation.  Patient is UTD with vaccines. Patient denies chest pain, SOB, abdominal pain, N/V/D,  or headache.

## 2024-01-25 ENCOUNTER — TELEPHONE (OUTPATIENT)
Dept: PRIMARY CARE | Facility: CLINIC | Age: 84
End: 2024-01-25
Payer: MEDICARE

## 2024-01-25 ENCOUNTER — PATIENT OUTREACH (OUTPATIENT)
Dept: HEMATOLOGY/ONCOLOGY | Facility: HOSPITAL | Age: 84
End: 2024-01-25
Payer: MEDICARE

## 2024-01-25 NOTE — TELEPHONE ENCOUNTER
Left message for teddy to call and get results below....    ----- Message from Triston Samson MD sent at 1/24/2024  9:31 AM EST -----  Blood work is ok. Continue on all of the meds as we discussed yesterday.

## 2024-01-26 ENCOUNTER — HOME CARE VISIT (OUTPATIENT)
Dept: HOME HEALTH SERVICES | Facility: HOME HEALTH | Age: 84
End: 2024-01-26
Payer: MEDICARE

## 2024-01-26 VITALS
DIASTOLIC BLOOD PRESSURE: 60 MMHG | SYSTOLIC BLOOD PRESSURE: 110 MMHG | OXYGEN SATURATION: 95 % | HEART RATE: 95 BPM | TEMPERATURE: 99 F | RESPIRATION RATE: 18 BRPM

## 2024-01-26 DIAGNOSIS — R33.9 URINARY RETENTION: Primary | ICD-10-CM

## 2024-01-26 PROCEDURE — G0299 HHS/HOSPICE OF RN EA 15 MIN: HCPCS

## 2024-01-26 PROCEDURE — 400014 HH F/U

## 2024-01-26 RX ORDER — OXYCODONE HYDROCHLORIDE 5 MG/1
5 TABLET ORAL EVERY 6 HOURS PRN
Qty: 10 TABLET | Refills: 0 | Status: SHIPPED | OUTPATIENT
Start: 2024-01-26 | End: 2024-01-31

## 2024-01-26 ASSESSMENT — ENCOUNTER SYMPTOMS
LOWER EXTREMITY EDEMA: 1
MUSCLE WEAKNESS: 1
PAIN SEVERITY GOAL: 0/10
LOWEST PAIN SEVERITY IN PAST 24 HOURS: 0/10
HIGHEST PAIN SEVERITY IN PAST 24 HOURS: 7/10

## 2024-01-26 ASSESSMENT — PAIN SCALES - PAIN ASSESSMENT IN ADVANCED DEMENTIA (PAINAD): BREATHING: 0

## 2024-01-26 NOTE — CASE COMMUNICATION
Hello, I am a visiting nurse, seeing this pt now. Suprapubic cath was placed on 1/24. Pt has pain 7/10 since. Pt requesting pain med.   Pt did not recieve discharge written instruction. (Maybe he lost it). Could you give instruction to me and I will communicate it to the patient? Thank you. Pt's phone number [phone]

## 2024-01-26 NOTE — HOME HEALTH
Deal was removed, suprapubic cath placed on 1/24/24. It is a PRN visit to assess the patient post procedure. Message sent to dr Quintanilla to obtain instructions on suprapubic cath care.

## 2024-01-29 NOTE — ADDENDUM NOTE
Encounter addended by: Desire Patel RN on: 1/29/2024 5:04 PM   Actions taken: Charge Capture section accepted

## 2024-01-30 ENCOUNTER — HOSPITAL ENCOUNTER (OUTPATIENT)
Dept: RADIOLOGY | Facility: HOSPITAL | Age: 84
Discharge: HOME | End: 2024-01-30
Payer: MEDICARE

## 2024-01-30 ENCOUNTER — APPOINTMENT (OUTPATIENT)
Dept: PRIMARY CARE | Facility: CLINIC | Age: 84
End: 2024-01-30
Payer: MEDICARE

## 2024-01-30 DIAGNOSIS — M48.062 SPINAL STENOSIS OF LUMBAR REGION WITH NEUROGENIC CLAUDICATION: ICD-10-CM

## 2024-01-30 DIAGNOSIS — Z85.46 H/O PROSTATE CANCER: ICD-10-CM

## 2024-01-30 PROCEDURE — 72148 MRI LUMBAR SPINE W/O DYE: CPT

## 2024-01-30 PROCEDURE — 72148 MRI LUMBAR SPINE W/O DYE: CPT | Performed by: RADIOLOGY

## 2024-02-02 ENCOUNTER — HOME CARE VISIT (OUTPATIENT)
Dept: HOME HEALTH SERVICES | Facility: HOME HEALTH | Age: 84
End: 2024-02-02
Payer: MEDICARE

## 2024-02-02 VITALS
HEART RATE: 84 BPM | TEMPERATURE: 98.1 F | RESPIRATION RATE: 18 BRPM | SYSTOLIC BLOOD PRESSURE: 138 MMHG | DIASTOLIC BLOOD PRESSURE: 80 MMHG | OXYGEN SATURATION: 95 %

## 2024-02-02 PROCEDURE — G0299 HHS/HOSPICE OF RN EA 15 MIN: HCPCS

## 2024-02-02 ASSESSMENT — PAIN SCALES - PAIN ASSESSMENT IN ADVANCED DEMENTIA (PAINAD): BREATHING: 0

## 2024-02-02 ASSESSMENT — ENCOUNTER SYMPTOMS
HIGHEST PAIN SEVERITY IN PAST 24 HOURS: 0/10
LOWEST PAIN SEVERITY IN PAST 24 HOURS: 0/10
PAIN SEVERITY GOAL: 0/10
APPETITE LEVEL: GOOD
LOWER EXTREMITY EDEMA: 1
MUSCLE WEAKNESS: 1

## 2024-02-09 ENCOUNTER — HOME CARE VISIT (OUTPATIENT)
Dept: HOME HEALTH SERVICES | Facility: HOME HEALTH | Age: 84
End: 2024-02-09
Payer: MEDICARE

## 2024-02-09 VITALS
OXYGEN SATURATION: 95 % | RESPIRATION RATE: 18 BRPM | HEART RATE: 82 BPM | SYSTOLIC BLOOD PRESSURE: 138 MMHG | TEMPERATURE: 97.7 F | DIASTOLIC BLOOD PRESSURE: 80 MMHG

## 2024-02-09 PROCEDURE — G0299 HHS/HOSPICE OF RN EA 15 MIN: HCPCS

## 2024-02-09 ASSESSMENT — ENCOUNTER SYMPTOMS
LOWEST PAIN SEVERITY IN PAST 24 HOURS: 0/10
LOWER EXTREMITY EDEMA: 1
APPETITE LEVEL: GOOD
HIGHEST PAIN SEVERITY IN PAST 24 HOURS: 0/10
MUSCLE WEAKNESS: 1
PAIN SEVERITY GOAL: 0/10

## 2024-02-09 ASSESSMENT — PAIN SCALES - PAIN ASSESSMENT IN ADVANCED DEMENTIA (PAINAD): BREATHING: 0

## 2024-02-12 ENCOUNTER — PATIENT OUTREACH (OUTPATIENT)
Dept: HEMATOLOGY/ONCOLOGY | Facility: HOSPITAL | Age: 84
End: 2024-02-12
Payer: MEDICARE

## 2024-02-12 NOTE — PROGRESS NOTES
02/12/2024 11:17AM Reached out again on 01/25/2024 to phone number [phone], Isai answered, he is the nephew of Mr. Galo, stated he was the person that brought him to the appointment with Dr. Quintanilla the day before and that he was going to reach out. He stated his Uncle is hard of hearing and will not answer his phone. He also stated he wasn't sure if patient would follow through on taking medications needed for prostate cancer. Was able to get an appointment for 02/27/2024, based on his nephews availability to bring him. He stated he would call back if he was able to get someone else to bring him sooner. The following week it was noted the appointment was moved up to 02/20/24.  Patient is referred for prostate cancer, his original diagnosis was June of 2022 when he underwent holmium laser enucleation of prostate for urinary issues. Tissue was sent which revealed [place] 8 disease. Staging scans revealed metastatic disease. Patient was referred to oncology and saw Dr. Allen in August of 2022, he referred him to see radiation oncology for a discussion and then to see him back for consideration of systemic therapy.  Radiation oncologist determined radiotherapy not appropriate for patients current disease state. He aging met with Dr. Allen, he wanted patient to get a PSMA, patient declined. He discussed hormone therapy and Enzalutamide, patient verbalized he cannot swallow big pills. He then discussed taking with applesauce. Initial 3 month Eligard was given 08/23/2022, then again November 21, 2022 as well as Xgeva initiated 09/26/2022. He last saw Dr. Allen was in December of 2022, was then lost to follow-up. He was referred back to see oncology yet, appointments never made as no calls returned. Currently PSA''s as below:  12/10/2023: 4.83  12/20/22: 0.30  11/21/22: 0.36  10/24/22: 0.63  09/23/22: 1.70  08/09/22: 31.11.     Appointment scheduled with patients nephew, Isai who brings him to his appointments. All  patients information is in the system. Bruno Ortiz RN.

## 2024-02-16 ENCOUNTER — HOME CARE VISIT (OUTPATIENT)
Dept: HOME HEALTH SERVICES | Facility: HOME HEALTH | Age: 84
End: 2024-02-16
Payer: MEDICARE

## 2024-02-16 VITALS
HEART RATE: 60 BPM | OXYGEN SATURATION: 100 % | TEMPERATURE: 97.9 F | SYSTOLIC BLOOD PRESSURE: 128 MMHG | RESPIRATION RATE: 18 BRPM | DIASTOLIC BLOOD PRESSURE: 70 MMHG

## 2024-02-16 PROCEDURE — G0299 HHS/HOSPICE OF RN EA 15 MIN: HCPCS

## 2024-02-16 ASSESSMENT — ENCOUNTER SYMPTOMS
PAIN SEVERITY GOAL: 0/10
LOWER EXTREMITY EDEMA: 1
LOWEST PAIN SEVERITY IN PAST 24 HOURS: 0/10
HIGHEST PAIN SEVERITY IN PAST 24 HOURS: 0/10
MUSCLE WEAKNESS: 1
APPETITE LEVEL: FAIR

## 2024-02-16 ASSESSMENT — ACTIVITIES OF DAILY LIVING (ADL)
OASIS_M1830: 03
ENTERING_EXITING_HOME: NEEDS ASSISTANCE

## 2024-02-16 ASSESSMENT — PAIN SCALES - PAIN ASSESSMENT IN ADVANCED DEMENTIA (PAINAD): BREATHING: 0

## 2024-02-20 ENCOUNTER — TELEPHONE (OUTPATIENT)
Dept: HEMATOLOGY/ONCOLOGY | Facility: CLINIC | Age: 84
End: 2024-02-20
Payer: MEDICARE

## 2024-02-20 PROCEDURE — 400014 HH F/U

## 2024-02-20 NOTE — TELEPHONE ENCOUNTER
Called to discuss  Unable to make appointment today that was scheduled at 2pm    Reached out to urology team , as pt family would prefer closer to home and re follow up with Dr Allen  Will try to arrange f/up visit closer to home if possible

## 2024-02-21 ENCOUNTER — OFFICE VISIT (OUTPATIENT)
Dept: UROLOGY | Facility: HOSPITAL | Age: 84
End: 2024-02-21
Payer: MEDICARE

## 2024-02-21 DIAGNOSIS — R33.9 URINARY RETENTION: ICD-10-CM

## 2024-02-21 PROCEDURE — 1036F TOBACCO NON-USER: CPT | Performed by: UROLOGY

## 2024-02-21 PROCEDURE — 1160F RVW MEDS BY RX/DR IN RCRD: CPT | Performed by: UROLOGY

## 2024-02-21 PROCEDURE — 1125F AMNT PAIN NOTED PAIN PRSNT: CPT | Performed by: UROLOGY

## 2024-02-21 PROCEDURE — 1159F MED LIST DOCD IN RCRD: CPT | Performed by: UROLOGY

## 2024-02-21 PROCEDURE — 51703 INSERT BLADDER CATH COMPLEX: CPT | Performed by: UROLOGY

## 2024-02-21 PROCEDURE — 99212 OFFICE O/P EST SF 10 MIN: CPT | Performed by: UROLOGY

## 2024-02-21 PROCEDURE — 99212 OFFICE O/P EST SF 10 MIN: CPT | Mod: 25 | Performed by: UROLOGY

## 2024-02-21 PROCEDURE — 1157F ADVNC CARE PLAN IN RCRD: CPT | Performed by: UROLOGY

## 2024-02-21 NOTE — PROGRESS NOTES
HPI  83-year-old male with recurrent episodes of urinary retention, significant obstructive and irritative symptoms at baseline. Significant preoperative urge and urge incontinence. Tried and failed InterStim x2 back in 2019. Does not seem like he ever had a prostate procedure or urodynamics however. He does have insulin-dependent diabetes and neuropathy and nephropathy.    UDS 4/20/22 with DO, normal compliance, slightly diminshed capacity, moderate pressure (71 cm H20) bladder contraction with minimal flow c/w obstruction.    Now s/p HoLEP, 100U botox 6/17/22.     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 250cc instilled, no urge to void   6/21/22 - returns for PVR check, 34cc. voiding, issues with leakage.   7/5/22 - returns for 2 week follow up. PVR 93cc, voided a couple hours ago. still with signfiicant UUI. preop was using 4-5 diapers a day, now upwards of 8-10. intermittent blood in the urine. no DANA. started on myrbetriq 50mg daily  8/8/22 - UUI improving, now about equal to preop levels.    Prostate Cancer staging:  CT A/P 7/2022 - multiple lesions in the pelvic bones, lungs  CT chest - multiple small nodules bilatearlly.  NM bone scan - abnormal uptake in the pelvis    has seen rad onc and med onc. started on ADT + enzalutamide. No plan for radiation therapy at this time.    9/20/22 -returns for follow-up. Urge and urge incontinence is about the same. PVR 18 cc.     11/14/22- Presents today for cystoscopy/ botox 200U. Urge and urge incontinence is the same. Took antibiotic this morning.     11/29/22 - PVR 81cc. Still has nocturia 3-4x, urinary symptoms are similar to pre Botox, no improvement. Will start on Myrbetriq 50mg.    12/28/22 - seen in follow up. no better on myrbetriq. continues on hormonal therapy.     Hospitalized 2/7/2023 through 2/9/2023 with COVID, urinary retention, encephalopathy. A catheter was placed and a voiding trial was  attempted but failed.    2/20/23 -successful T OV today.    3/6/23 - seen for PVR check, 450+cc. leaking continuously. comfortable    4/17/23- Patient presents for a follow up on urinary retention. Patient still has urgency and incontinence.   Patient had stroke 10 years ago. here for cysto, urethra was widely opened. Discussed his atonic bladder - discussed self catheterization, patient declined. Set up HH cath changes, patient follow up prn.    UDS 4/2023 -  atonic bladder, complete retention    US Renal 12/14/23: Personally reviewed.  IMPRESSION:  1. Bilateral renal cysts; no hydronephrosis.  2. Bladder wall thickening and trabeculation.    1/4/24 - seen today for fu to recent ed admission for UTI/LAURA. US showed bilateral renal cysts with no hydro. Had gross hematuria, now resolved, we discussed the need for a cysto at some point. Today, reports issues with liu, does not appear to be in bladder.    1/24/24 - here today for cysto due to recent gross hematuria. SPT was placed earlier today    2/21/24 - here for SPT change. Successful SPT change today    Lab Results   Component Value Date    PSA 4.83 (H) 12/10/2023    PSA 0.30 12/20/2022    PSA 0.36 11/21/2022    PSA 0.63 10/24/2022    PSA 1.70 09/23/2022         Current Medications:  Current Outpatient Medications   Medication Sig Dispense Refill    ammonium lactate (Amlactin) 12 % cream Apply 1 Application topically if needed for dry skin.      aspirin 81 mg chewable tablet Chew 1 tablet (81 mg) once daily.      atorvastatin (Lipitor) 80 mg tablet Take 1 tablet (80 mg) by mouth once daily.      blood sugar diagnostic (OneTouch Ultra Test) strip Use once daily 100 strip 1    dulaglutide (Trulicity) 0.75 mg/0.5 mL pen injector Inject 0.75 mg under the skin 1 (one) time per week. 2 mL 1    FreeStyle glucose monitoring (OneTouch Ultra2 Meter) kit As directed 1 each 0    FreeStyle Bev reader (FreeStyle Bev 2 [place]) misc Use as instructed 1 each 0    FreeStyle  "Bev sensor system (FreeStyle Bev 2 Sensor) kit Use as instructed 6 each 0    gabapentin (Neurontin) 100 mg capsule Take 1 capsule (100 mg) by mouth 3 times a day.      insulin glargine (Lantus Solostar U-100 Insulin) 100 unit/mL (3 mL) pen Inject 10 Units under the skin once daily in the morning. Take as directed per insulin instructions. 6 mL 1    isosorbide mononitrate ER (Imdur) 30 mg 24 hr tablet Take 1 tablet (30 mg) by mouth once daily.      lancets (OneTouch UltraSoft Lancets) misc Use once daily 100 each 1    leuprolide acetate (LEUPROLIDE, BULK, MISC) 5 mg. Lupron 5 MG      metoprolol tartrate (Lopressor) 25 mg tablet Take 1 tablet (25 mg) by mouth 2 times a day. 180 tablet 3    oxyCODONE (Roxicodone) 5 mg immediate release tablet Take 5 mg by mouth 4 times a day as needed for severe pain (7 - 10). Indications: pain      pen needle, diabetic 32 gauge x 5/32\" needle Use one daily 100 each 2    SITagliptin phosphate (Januvia) 100 mg tablet Take 1 tablet (100 mg) by mouth once daily. 90 tablet 1    tamsulosin (Flomax) 0.4 mg 24 hr capsule Take 1 capsule (0.4 mg) by mouth once daily. Do not start before December 17, 2023. (Patient not taking: Reported on 1/24/2024) 30 capsule 0    torsemide 40 mg tablet Take 40 mg by mouth once daily. 30 tablet 3     No current facility-administered medications for this visit.        Active Problems:  Dennis Galo is a 83 y.o. male with the following Problems and Medications.  Patient Active Problem List   Diagnosis    Atonic bladder    Balanitis    Bilateral edema of lower extremity    BPH (benign prostatic hyperplasia)    CAD (coronary artery disease)    Chronic low back pain    CKD (chronic kidney disease), stage III (CMS/HCC)    Diabetic nephropathy (CMS/HCC)    Dribbling of urine    Gait instability    Gout    Hypertension associated with diabetes (CMS/HCC)    Neurogenic bladder    Neuropathic pain    Primary osteoarthritis of right hip    Prostate cancer " (CMS/HCC)    S/P CABG (coronary artery bypass graft)    Severe obesity (BMI 35.0-39.9) with comorbidity (CMS/HCC)    Systolic heart failure (CMS/HCC)    Urge incontinence of urine    Urinary retention    Venous stasis dermatitis of both lower extremities    Wound of skin    Overflow incontinence of urine    Type 2 diabetes mellitus with chronic kidney disease, with long-term current use of insulin (CMS/HCC)    Type 2 diabetes mellitus with diabetic peripheral angiopathy without gangrene, with long-term current use of insulin (CMS/HCC)    Bronchiectasis, uncomplicated (CMS/HCC)    Lower urinary tract infection    LAURA (acute kidney injury) (CMS/HCC)     Current Outpatient Medications   Medication Sig Dispense Refill    ammonium lactate (Amlactin) 12 % cream Apply 1 Application topically if needed for dry skin.      aspirin 81 mg chewable tablet Chew 1 tablet (81 mg) once daily.      atorvastatin (Lipitor) 80 mg tablet Take 1 tablet (80 mg) by mouth once daily.      blood sugar diagnostic (OneTouch Ultra Test) strip Use once daily 100 strip 1    dulaglutide (Trulicity) 0.75 mg/0.5 mL pen injector Inject 0.75 mg under the skin 1 (one) time per week. 2 mL 1    FreeStyle glucose monitoring (OneTouch Ultra2 Meter) kit As directed 1 each 0    FreeStyle Bev reader (FreeStyle Bev 2 [place]) misc Use as instructed 1 each 0    FreeStyle Bev sensor system (FreeStyle Bev 2 Sensor) kit Use as instructed 6 each 0    gabapentin (Neurontin) 100 mg capsule Take 1 capsule (100 mg) by mouth 3 times a day.      insulin glargine (Lantus Solostar U-100 Insulin) 100 unit/mL (3 mL) pen Inject 10 Units under the skin once daily in the morning. Take as directed per insulin instructions. 6 mL 1    isosorbide mononitrate ER (Imdur) 30 mg 24 hr tablet Take 1 tablet (30 mg) by mouth once daily.      lancets (OneTouch UltraSoft Lancets) misc Use once daily 100 each 1    leuprolide acetate (LEUPROLIDE, BULK, MISC) 5 mg. Lupron 5 MG       "metoprolol tartrate (Lopressor) 25 mg tablet Take 1 tablet (25 mg) by mouth 2 times a day. 180 tablet 3    oxyCODONE (Roxicodone) 5 mg immediate release tablet Take 5 mg by mouth 4 times a day as needed for severe pain (7 - 10). Indications: pain      pen needle, diabetic 32 gauge x \" needle Use one daily 100 each 2    SITagliptin phosphate (Januvia) 100 mg tablet Take 1 tablet (100 mg) by mouth once daily. 90 tablet 1    tamsulosin (Flomax) 0.4 mg 24 hr capsule Take 1 capsule (0.4 mg) by mouth once daily. Do not start before 2023. (Patient not taking: Reported on 2024) 30 capsule 0    torsemide 40 mg tablet Take 40 mg by mouth once daily. 30 tablet 3     No current facility-administered medications for this visit.       PMH:  Past Medical History:   Diagnosis Date    CAD (coronary artery disease)     CHF (congestive heart failure) (CMS/HCC)     EF 40-45%    Chronic indwelling Deal catheter     Chronic renal disease, stage III (CMS/HCC)     Diabetes mellitus (CMS/HCC)     Eczema     Hiatal hernia     Hypertension     Infarction of spleen     Intracardiac thrombosis, not elsewhere classified     Apical mural thrombus    Old myocardial infarction     Prostate cancer (CMS/HCC)     Pulmonary nodules     Renal cyst     bilateral    TIA (transient ischemic attack)        PSH:  Past Surgical History:   Procedure Laterality Date    CARDIAC CATHETERIZATION      2 stents    CORONARY ARTERY BYPASS GRAFT      PILONIDAL CYST DRAINAGE  2018    Pilonidal Cyst Resection    PROSTATE SURGERY         FMH:  No family history on file.    SHx:  Social History     Tobacco Use    Smoking status: Former     Types: Cigarettes     Quit date:      Years since quittin.1    Smokeless tobacco: Never   Vaping Use    Vaping Use: Never used   Substance Use Topics    Alcohol use: Never    Drug use: Never       Allergies:  No Known Allergies    Patient ID: Dennis KO Iraj is a 83 y.o. " male.    Procedures  Complex sp catheter change routine     16F suprapubic catheter change today without difficulty. 10 mL sterile water balloon inflation. Urine draining clear and yellow. Patient tolerated well.      Will follow up in 1 month for routine change with HH    Assessment/Plan  SPT changed successfully today. Will arrange for monthly SPT changes via home health. Patient will follow up with hem onc as planned and follow up with me prn.    Scribe Attestation  By signing my name below, IMichelle-Victor M Puentes   attest that this documentation has been prepared under the direction and in the presence of Castro Quintanilla MD.

## 2024-02-22 PROCEDURE — G0179 MD RECERTIFICATION HHA PT: HCPCS | Performed by: UROLOGY

## 2024-02-27 ENCOUNTER — APPOINTMENT (OUTPATIENT)
Dept: HEMATOLOGY/ONCOLOGY | Facility: CLINIC | Age: 84
End: 2024-02-27
Payer: MEDICARE

## 2024-03-05 ENCOUNTER — APPOINTMENT (OUTPATIENT)
Dept: HEMATOLOGY/ONCOLOGY | Facility: CLINIC | Age: 84
End: 2024-03-05
Payer: MEDICARE

## 2024-03-06 ENCOUNTER — HOME CARE VISIT (OUTPATIENT)
Dept: HOME HEALTH SERVICES | Facility: HOME HEALTH | Age: 84
End: 2024-03-06
Payer: MEDICARE

## 2024-03-06 VITALS
RESPIRATION RATE: 18 BRPM | SYSTOLIC BLOOD PRESSURE: 124 MMHG | DIASTOLIC BLOOD PRESSURE: 68 MMHG | TEMPERATURE: 97.8 F | HEART RATE: 62 BPM | OXYGEN SATURATION: 99 %

## 2024-03-06 DIAGNOSIS — N31.9 NEUROGENIC BLADDER: ICD-10-CM

## 2024-03-06 DIAGNOSIS — R33.9 URINARY RETENTION: ICD-10-CM

## 2024-03-06 PROCEDURE — G0300 HHS/HOSPICE OF LPN EA 15 MIN: HCPCS

## 2024-03-06 PROCEDURE — 400014 HH F/U

## 2024-03-06 ASSESSMENT — PAIN SCALES - PAIN ASSESSMENT IN ADVANCED DEMENTIA (PAINAD)
BODYLANGUAGE: 0
CONSOLABILITY: 0
NEGVOCALIZATION: 0
NEGVOCALIZATION: 0 - NONE.
CONSOLABILITY: 0 - NO NEED TO CONSOLE.
BREATHING: 0
TOTALSCORE: 0
BODYLANGUAGE: 0 - RELAXED.
FACIALEXPRESSION: 0 - SMILING OR INEXPRESSIVE.
FACIALEXPRESSION: 0

## 2024-03-06 ASSESSMENT — ENCOUNTER SYMPTOMS
BOWEL PATTERN NORMAL: 1
LAST BOWEL MOVEMENT: 66905
HIGHEST PAIN SEVERITY IN PAST 24 HOURS: 0/10
LOWER EXTREMITY EDEMA: 1
MUSCLE WEAKNESS: 1
FATIGUES EASILY: 1
SUBJECTIVE PAIN PROGRESSION: UNCHANGED
LIMITED RANGE OF MOTION: 1
PERSON REPORTING PAIN: PATIENT
STOOL FREQUENCY: DAILY
CHANGE IN APPETITE: UNCHANGED
PAIN SEVERITY GOAL: 0/10
APPETITE LEVEL: GOOD
LOWEST PAIN SEVERITY IN PAST 24 HOURS: 0/10
DENIES PAIN: 1

## 2024-03-06 ASSESSMENT — ACTIVITIES OF DAILY LIVING (ADL): MONEY MANAGEMENT (EXPENSES/BILLS): INDEPENDENT

## 2024-03-06 NOTE — HOME HEALTH
"Patient states he has intermittent pain to a chronic cyst area close to rectum. They are currently waiting for a callback from NP regarding an MRI he had of the area on January 30th at [facility]. Contacted Kylah Worrell NP, to obtain results of MRI. Kylah stated results showed arthritis to lumbar regrion but no issues that would require surgery. Patient is being seen by his PCP on 3/12/24 and will inquire about the possibility of a recurrence of the cyst. Patient states he has not been checking blood glucose levels for \"a few months\",assisted patient with setting up new glucometer that was provided by PCP and demonstrated/educated patient and spouse on usage. Blood glucose reading after eating breakfast and lunch 228. Both patient and spouse verbalize understanding of all teaching. Contacted Urologist, Dr. Quintanilla, to request new order for [facility] to change patient's suprapubic catheter. VS stable and WNL."

## 2024-03-06 NOTE — HOME HEALTH
Patient states he has intermittent pain to a chronic cyst area close to rectum. They are currently waiting for a callback from NP regarding an MRI he had of the area on January 30 at [facility]. VS stable and WNL. Suprapubic catheter changed without complication. Patient tolerated procedure well.

## 2024-03-11 DIAGNOSIS — Z79.4 TYPE 2 DIABETES MELLITUS WITH DIABETIC PERIPHERAL ANGIOPATHY WITHOUT GANGRENE, WITH LONG-TERM CURRENT USE OF INSULIN (MULTI): ICD-10-CM

## 2024-03-11 DIAGNOSIS — E11.51 TYPE 2 DIABETES MELLITUS WITH DIABETIC PERIPHERAL ANGIOPATHY WITHOUT GANGRENE, WITH LONG-TERM CURRENT USE OF INSULIN (MULTI): ICD-10-CM

## 2024-03-11 RX ORDER — DULAGLUTIDE 0.75 MG/.5ML
0.75 INJECTION, SOLUTION SUBCUTANEOUS
Qty: 2 ML | Refills: 1 | Status: SHIPPED | OUTPATIENT
Start: 2024-03-11 | End: 2024-03-14

## 2024-03-12 ENCOUNTER — APPOINTMENT (OUTPATIENT)
Dept: PRIMARY CARE | Facility: CLINIC | Age: 84
End: 2024-03-12
Payer: MEDICARE

## 2024-03-12 ENCOUNTER — OFFICE VISIT (OUTPATIENT)
Dept: PRIMARY CARE | Facility: CLINIC | Age: 84
End: 2024-03-12
Payer: MEDICARE

## 2024-03-12 ENCOUNTER — APPOINTMENT (OUTPATIENT)
Dept: HEMATOLOGY/ONCOLOGY | Facility: CLINIC | Age: 84
End: 2024-03-12
Payer: MEDICARE

## 2024-03-12 ENCOUNTER — LAB (OUTPATIENT)
Dept: LAB | Facility: LAB | Age: 84
End: 2024-03-12
Payer: MEDICARE

## 2024-03-12 VITALS
HEIGHT: 71 IN | HEART RATE: 71 BPM | WEIGHT: 220 LBS | BODY MASS INDEX: 30.8 KG/M2 | TEMPERATURE: 97.5 F | SYSTOLIC BLOOD PRESSURE: 148 MMHG | DIASTOLIC BLOOD PRESSURE: 74 MMHG | OXYGEN SATURATION: 95 %

## 2024-03-12 DIAGNOSIS — Z79.4 TYPE 2 DIABETES MELLITUS WITH STAGE 3B CHRONIC KIDNEY DISEASE, WITH LONG-TERM CURRENT USE OF INSULIN (MULTI): ICD-10-CM

## 2024-03-12 DIAGNOSIS — N18.32 TYPE 2 DIABETES MELLITUS WITH STAGE 3B CHRONIC KIDNEY DISEASE, WITH LONG-TERM CURRENT USE OF INSULIN (MULTI): Primary | ICD-10-CM

## 2024-03-12 DIAGNOSIS — R60.0 BILATERAL EDEMA OF LOWER EXTREMITY: ICD-10-CM

## 2024-03-12 DIAGNOSIS — I15.2 HYPERTENSION ASSOCIATED WITH DIABETES (MULTI): ICD-10-CM

## 2024-03-12 DIAGNOSIS — T14.8XXA WOUND OF SKIN: ICD-10-CM

## 2024-03-12 DIAGNOSIS — N18.30 STAGE 3 CHRONIC KIDNEY DISEASE, UNSPECIFIED WHETHER STAGE 3A OR 3B CKD (MULTI): ICD-10-CM

## 2024-03-12 DIAGNOSIS — E11.22 TYPE 2 DIABETES MELLITUS WITH STAGE 3B CHRONIC KIDNEY DISEASE, WITH LONG-TERM CURRENT USE OF INSULIN (MULTI): Primary | ICD-10-CM

## 2024-03-12 DIAGNOSIS — E11.59 HYPERTENSION ASSOCIATED WITH DIABETES (MULTI): ICD-10-CM

## 2024-03-12 DIAGNOSIS — E11.22 TYPE 2 DIABETES MELLITUS WITH STAGE 3B CHRONIC KIDNEY DISEASE, WITH LONG-TERM CURRENT USE OF INSULIN (MULTI): ICD-10-CM

## 2024-03-12 DIAGNOSIS — N17.9 ACUTE KIDNEY FAILURE, UNSPECIFIED (CMS-HCC): Primary | ICD-10-CM

## 2024-03-12 DIAGNOSIS — N18.32 TYPE 2 DIABETES MELLITUS WITH STAGE 3B CHRONIC KIDNEY DISEASE, WITH LONG-TERM CURRENT USE OF INSULIN (MULTI): ICD-10-CM

## 2024-03-12 DIAGNOSIS — Z79.4 TYPE 2 DIABETES MELLITUS WITH STAGE 3B CHRONIC KIDNEY DISEASE, WITH LONG-TERM CURRENT USE OF INSULIN (MULTI): Primary | ICD-10-CM

## 2024-03-12 LAB
ALBUMIN SERPL BCP-MCNC: 3.5 G/DL (ref 3.4–5)
ALP SERPL-CCNC: 76 U/L (ref 33–136)
ALT SERPL W P-5'-P-CCNC: 15 U/L (ref 10–52)
ANION GAP SERPL CALC-SCNC: 14 MMOL/L (ref 10–20)
AST SERPL W P-5'-P-CCNC: 14 U/L (ref 9–39)
BASOPHILS # BLD AUTO: 0.07 X10*3/UL (ref 0–0.1)
BASOPHILS NFR BLD AUTO: 0.9 %
BILIRUB SERPL-MCNC: 1.3 MG/DL (ref 0–1.2)
BUN SERPL-MCNC: 18 MG/DL (ref 6–23)
CALCIUM SERPL-MCNC: 8.9 MG/DL (ref 8.6–10.6)
CHLORIDE SERPL-SCNC: 99 MMOL/L (ref 98–107)
CO2 SERPL-SCNC: 32 MMOL/L (ref 21–32)
CREAT SERPL-MCNC: 1.46 MG/DL (ref 0.5–1.3)
EGFRCR SERPLBLD CKD-EPI 2021: 48 ML/MIN/1.73M*2
EOSINOPHIL # BLD AUTO: 0.21 X10*3/UL (ref 0–0.4)
EOSINOPHIL NFR BLD AUTO: 2.7 %
ERYTHROCYTE [DISTWIDTH] IN BLOOD BY AUTOMATED COUNT: 14.8 % (ref 11.5–14.5)
EST. AVERAGE GLUCOSE BLD GHB EST-MCNC: 240 MG/DL
GLUCOSE SERPL-MCNC: 137 MG/DL (ref 74–99)
HBA1C MFR BLD: 10 %
HCT VFR BLD AUTO: 39.3 % (ref 41–52)
HGB BLD-MCNC: 12.5 G/DL (ref 13.5–17.5)
IMM GRANULOCYTES # BLD AUTO: 0.04 X10*3/UL (ref 0–0.5)
IMM GRANULOCYTES NFR BLD AUTO: 0.5 % (ref 0–0.9)
LYMPHOCYTES # BLD AUTO: 0.77 X10*3/UL (ref 0.8–3)
LYMPHOCYTES NFR BLD AUTO: 9.9 %
MAGNESIUM SERPL-MCNC: 1.73 MG/DL (ref 1.6–2.4)
MCH RBC QN AUTO: 27.9 PG (ref 26–34)
MCHC RBC AUTO-ENTMCNC: 31.8 G/DL (ref 32–36)
MCV RBC AUTO: 88 FL (ref 80–100)
MONOCYTES # BLD AUTO: 0.51 X10*3/UL (ref 0.05–0.8)
MONOCYTES NFR BLD AUTO: 6.6 %
NEUTROPHILS # BLD AUTO: 6.18 X10*3/UL (ref 1.6–5.5)
NEUTROPHILS NFR BLD AUTO: 79.4 %
NRBC BLD-RTO: 0 /100 WBCS (ref 0–0)
PHOSPHATE SERPL-MCNC: 3.2 MG/DL (ref 2.5–4.9)
PLATELET # BLD AUTO: 233 X10*3/UL (ref 150–450)
POTASSIUM SERPL-SCNC: 4 MMOL/L (ref 3.5–5.3)
PROT SERPL-MCNC: 6.3 G/DL (ref 6.4–8.2)
RBC # BLD AUTO: 4.48 X10*6/UL (ref 4.5–5.9)
SODIUM SERPL-SCNC: 141 MMOL/L (ref 136–145)
URATE SERPL-MCNC: 7.4 MG/DL (ref 4–7.5)
WBC # BLD AUTO: 7.8 X10*3/UL (ref 4.4–11.3)

## 2024-03-12 PROCEDURE — 1159F MED LIST DOCD IN RCRD: CPT | Performed by: INTERNAL MEDICINE

## 2024-03-12 PROCEDURE — 83036 HEMOGLOBIN GLYCOSYLATED A1C: CPT

## 2024-03-12 PROCEDURE — 99214 OFFICE O/P EST MOD 30 MIN: CPT | Performed by: INTERNAL MEDICINE

## 2024-03-12 PROCEDURE — 84100 ASSAY OF PHOSPHORUS: CPT

## 2024-03-12 PROCEDURE — G2211 COMPLEX E/M VISIT ADD ON: HCPCS | Performed by: INTERNAL MEDICINE

## 2024-03-12 PROCEDURE — 1036F TOBACCO NON-USER: CPT | Performed by: INTERNAL MEDICINE

## 2024-03-12 PROCEDURE — 3078F DIAST BP <80 MM HG: CPT | Performed by: INTERNAL MEDICINE

## 2024-03-12 PROCEDURE — 1125F AMNT PAIN NOTED PAIN PRSNT: CPT | Performed by: INTERNAL MEDICINE

## 2024-03-12 PROCEDURE — 80053 COMPREHEN METABOLIC PANEL: CPT

## 2024-03-12 PROCEDURE — 3077F SYST BP >= 140 MM HG: CPT | Performed by: INTERNAL MEDICINE

## 2024-03-12 PROCEDURE — 36415 COLL VENOUS BLD VENIPUNCTURE: CPT

## 2024-03-12 PROCEDURE — 1157F ADVNC CARE PLAN IN RCRD: CPT | Performed by: INTERNAL MEDICINE

## 2024-03-12 PROCEDURE — 85025 COMPLETE CBC W/AUTO DIFF WBC: CPT

## 2024-03-12 PROCEDURE — 83735 ASSAY OF MAGNESIUM: CPT

## 2024-03-12 PROCEDURE — 84550 ASSAY OF BLOOD/URIC ACID: CPT

## 2024-03-12 PROCEDURE — 1160F RVW MEDS BY RX/DR IN RCRD: CPT | Performed by: INTERNAL MEDICINE

## 2024-03-12 ASSESSMENT — PATIENT HEALTH QUESTIONNAIRE - PHQ9
1. LITTLE INTEREST OR PLEASURE IN DOING THINGS: NOT AT ALL
2. FEELING DOWN, DEPRESSED OR HOPELESS: NOT AT ALL
SUM OF ALL RESPONSES TO PHQ9 QUESTIONS 1 & 2: 0

## 2024-03-12 NOTE — PROGRESS NOTES
"Subjective   Patient ID: Dennis Galo is a 83 y.o. male who presents for Follow-up.    HPI   Bev was too complicated for him.  FBS this . This was the first time he checked since last visit.  On 10 units lantus  On trulicity    Has wound on R shin that is healing well.  Leg swelling is much better.  On torsemide 40mg  Weight down 20 or more pounds.  Breathing is ok.      Review of Systems    Objective   /74   Pulse 71   Temp 36.4 °C (97.5 °F)   Ht 1.791 m (5' 10.5\")   Wt 99.8 kg (220 lb)   SpO2 95%   BMI 31.12 kg/m²     Physical Exam  Cardiovascular:      Rate and Rhythm: Normal rate and regular rhythm.      Heart sounds: No murmur heard.  Pulmonary:      Effort: Pulmonary effort is normal.      Breath sounds: Normal breath sounds.   Musculoskeletal:      Right lower leg: Edema (1+) present.      Left lower leg: Edema (1+) present.   Skin:     Comments: 3mm scab wound on right shin, without surrounding erythema or drainage   Neurological:      Mental Status: He is alert.         Assessment/Plan   Problem List Items Addressed This Visit             ICD-10-CM    Bilateral edema of lower extremity R60.0    Hypertension associated with diabetes (CMS/[facility]) E11.59, I15.2    Wound of skin T14.8XXA    Type 2 diabetes mellitus with chronic kidney disease, with long-term current use of insulin (CMS/[facility]) - Primary E11.22, Z79.4    Relevant Orders    CBC    Hemoglobin A1C    Comprehensive Metabolic Panel        Urinary stable. Follow up with Urology.  DM. Check A1C. Continue lantus at 10 units daily, conitnue GLP-1, continue januvia.  CHF, CKD - Swelling much better on higher torsemide. Continue torsdemide 40mg/day. Seeing Renal, Dr Bess.   Skin wound - healing well, monitor  Check labs today     Follow up 6 weeks  "

## 2024-03-14 ENCOUNTER — TELEPHONE (OUTPATIENT)
Dept: PRIMARY CARE | Facility: CLINIC | Age: 84
End: 2024-03-14
Payer: MEDICARE

## 2024-03-14 DIAGNOSIS — Z79.4 TYPE 2 DIABETES MELLITUS WITH STAGE 3B CHRONIC KIDNEY DISEASE, WITH LONG-TERM CURRENT USE OF INSULIN (MULTI): ICD-10-CM

## 2024-03-14 DIAGNOSIS — E11.51 TYPE 2 DIABETES MELLITUS WITH DIABETIC PERIPHERAL ANGIOPATHY WITHOUT GANGRENE, WITH LONG-TERM CURRENT USE OF INSULIN (MULTI): ICD-10-CM

## 2024-03-14 DIAGNOSIS — E11.22 TYPE 2 DIABETES MELLITUS WITH STAGE 3B CHRONIC KIDNEY DISEASE, WITH LONG-TERM CURRENT USE OF INSULIN (MULTI): ICD-10-CM

## 2024-03-14 DIAGNOSIS — Z79.4 TYPE 2 DIABETES MELLITUS WITH DIABETIC PERIPHERAL ANGIOPATHY WITHOUT GANGRENE, WITH LONG-TERM CURRENT USE OF INSULIN (MULTI): ICD-10-CM

## 2024-03-14 DIAGNOSIS — N18.32 TYPE 2 DIABETES MELLITUS WITH STAGE 3B CHRONIC KIDNEY DISEASE, WITH LONG-TERM CURRENT USE OF INSULIN (MULTI): ICD-10-CM

## 2024-03-14 RX ORDER — DULAGLUTIDE 1.5 MG/.5ML
1.5 INJECTION, SOLUTION SUBCUTANEOUS
Qty: 2 ML | Refills: 2 | Status: SHIPPED | OUTPATIENT
Start: 2024-03-14 | End: 2024-03-27 | Stop reason: SDUPTHER

## 2024-03-14 RX ORDER — DULAGLUTIDE 0.75 MG/.5ML
0.75 INJECTION, SOLUTION SUBCUTANEOUS
Qty: 2 ML | Refills: 1 | Status: CANCELLED | OUTPATIENT
Start: 2024-03-14

## 2024-03-14 RX ORDER — INSULIN GLARGINE 100 [IU]/ML
16 INJECTION, SOLUTION SUBCUTANEOUS EVERY MORNING
Qty: 6 ML | Refills: 1 | Status: SHIPPED | OUTPATIENT
Start: 2024-03-14 | End: 2024-06-11 | Stop reason: DRUGHIGH

## 2024-03-14 NOTE — TELEPHONE ENCOUNTER
----- Message from Triston Samson MD sent at 3/14/2024  2:43 PM EDT -----  A1C jumped up to 10.0. He should increase his lantus to 16 units per day and I sent in a higher dose of the trulicity.

## 2024-03-16 ENCOUNTER — HOME CARE VISIT (OUTPATIENT)
Dept: HOME HEALTH SERVICES | Facility: HOME HEALTH | Age: 84
End: 2024-03-16
Payer: MEDICARE

## 2024-03-16 VITALS
HEART RATE: 64 BPM | SYSTOLIC BLOOD PRESSURE: 128 MMHG | TEMPERATURE: 97.7 F | OXYGEN SATURATION: 97 % | DIASTOLIC BLOOD PRESSURE: 72 MMHG | RESPIRATION RATE: 18 BRPM

## 2024-03-16 PROCEDURE — G0300 HHS/HOSPICE OF LPN EA 15 MIN: HCPCS

## 2024-03-16 ASSESSMENT — ENCOUNTER SYMPTOMS
APPETITE LEVEL: GOOD
BOWEL PATTERN NORMAL: 1
PAIN SEVERITY GOAL: 0/10
LOWEST PAIN SEVERITY IN PAST 24 HOURS: 0/10
SUBJECTIVE PAIN PROGRESSION: UNCHANGED
DENIES PAIN: 1
HIGHEST PAIN SEVERITY IN PAST 24 HOURS: 0/10
MUSCLE WEAKNESS: 1
CHANGE IN APPETITE: UNCHANGED
LOWER EXTREMITY EDEMA: 1
LAST BOWEL MOVEMENT: 66915
STOOL FREQUENCY: DAILY
PERSON REPORTING PAIN: PATIENT

## 2024-03-16 ASSESSMENT — PAIN SCALES - PAIN ASSESSMENT IN ADVANCED DEMENTIA (PAINAD)
TOTALSCORE: 0
NEGVOCALIZATION: 0
FACIALEXPRESSION: 0
BODYLANGUAGE: 0
NEGVOCALIZATION: 0 - NONE.
BREATHING: 0
FACIALEXPRESSION: 0 - SMILING OR INEXPRESSIVE.
CONSOLABILITY: 0 - NO NEED TO CONSOLE.
CONSOLABILITY: 0
BODYLANGUAGE: 0 - RELAXED.

## 2024-03-16 ASSESSMENT — ACTIVITIES OF DAILY LIVING (ADL): MONEY MANAGEMENT (EXPENSES/BILLS): INDEPENDENT

## 2024-03-16 NOTE — HOME HEALTH
Awaiting patient leg bags to change super pubic catheter, which is scheduled for next Wednesday, March 20. Sorted through four boxes of supplies that patient had from when he was utilizing a urethral dwelling Deal catheter, removed any catheters, which were no longer being used due to transition to supra pubic catheter. Reviewed with patient and spouse. Use of glucometer and lancets to check blood glucose level daily. Both verbalized u nderstanding.All VS WNL. patient’s next visit will be next Wednesday to change super pubic catheter.

## 2024-03-18 ENCOUNTER — TELEPHONE (OUTPATIENT)
Dept: PRIMARY CARE | Facility: CLINIC | Age: 84
End: 2024-03-18
Payer: MEDICARE

## 2024-03-20 ENCOUNTER — HOME CARE VISIT (OUTPATIENT)
Dept: HOME HEALTH SERVICES | Facility: HOME HEALTH | Age: 84
End: 2024-03-20
Payer: MEDICARE

## 2024-03-20 VITALS
HEART RATE: 68 BPM | OXYGEN SATURATION: 97 % | TEMPERATURE: 97.7 F | RESPIRATION RATE: 18 BRPM | DIASTOLIC BLOOD PRESSURE: 72 MMHG | SYSTOLIC BLOOD PRESSURE: 124 MMHG

## 2024-03-20 PROCEDURE — G0300 HHS/HOSPICE OF LPN EA 15 MIN: HCPCS

## 2024-03-20 ASSESSMENT — PAIN SCALES - PAIN ASSESSMENT IN ADVANCED DEMENTIA (PAINAD)
NEGVOCALIZATION: 0 - NONE.
FACIALEXPRESSION: 0 - SMILING OR INEXPRESSIVE.
CONSOLABILITY: 0 - NO NEED TO CONSOLE.
BREATHING: 0
BODYLANGUAGE: 0 - RELAXED.
NEGVOCALIZATION: 0
TOTALSCORE: 0
BODYLANGUAGE: 0
FACIALEXPRESSION: 0
CONSOLABILITY: 0

## 2024-03-20 ASSESSMENT — ENCOUNTER SYMPTOMS
APPETITE LEVEL: GOOD
MUSCLE WEAKNESS: 1
BOWEL PATTERN NORMAL: 1
FATIGUES EASILY: 1
PERSON REPORTING PAIN: PATIENT
DENIES PAIN: 1
LOWEST PAIN SEVERITY IN PAST 24 HOURS: 0/10
STOOL FREQUENCY: DAILY
SUBJECTIVE PAIN PROGRESSION: UNCHANGED
PAIN SEVERITY GOAL: 0/10
HIGHEST PAIN SEVERITY IN PAST 24 HOURS: 0/10
LAST BOWEL MOVEMENT: 66919
CHANGE IN APPETITE: UNCHANGED

## 2024-03-20 ASSESSMENT — ACTIVITIES OF DAILY LIVING (ADL): MONEY MANAGEMENT (EXPENSES/BILLS): INDEPENDENT

## 2024-03-20 NOTE — HOME HEALTH
Patient seen today for routine suprapubic catheter change. Catheter changed without complication, patient tolerated procedure well. 16F s**t Deal catheter inserted, balloon inflated with 10ml SNS. Clear yellow-colored urine observed to be flowing freely through catheter tubing to leg bag. All VS WNL. Supplies ordered and received at patient’s home.

## 2024-03-21 ENCOUNTER — TELEPHONE (OUTPATIENT)
Dept: PRIMARY CARE | Facility: CLINIC | Age: 84
End: 2024-03-21
Payer: MEDICARE

## 2024-03-26 ENCOUNTER — TELEPHONE (OUTPATIENT)
Dept: PRIMARY CARE | Facility: CLINIC | Age: 84
End: 2024-03-26
Payer: MEDICARE

## 2024-03-27 ENCOUNTER — TELEPHONE (OUTPATIENT)
Dept: PRIMARY CARE | Facility: CLINIC | Age: 84
End: 2024-03-27
Payer: MEDICARE

## 2024-03-27 ENCOUNTER — HOME CARE VISIT (OUTPATIENT)
Dept: HOME HEALTH SERVICES | Facility: HOME HEALTH | Age: 84
End: 2024-03-27
Payer: MEDICARE

## 2024-03-27 DIAGNOSIS — E11.51 TYPE 2 DIABETES MELLITUS WITH DIABETIC PERIPHERAL ANGIOPATHY WITHOUT GANGRENE, WITH LONG-TERM CURRENT USE OF INSULIN (MULTI): ICD-10-CM

## 2024-03-27 DIAGNOSIS — Z79.4 TYPE 2 DIABETES MELLITUS WITH DIABETIC PERIPHERAL ANGIOPATHY WITHOUT GANGRENE, WITH LONG-TERM CURRENT USE OF INSULIN (MULTI): ICD-10-CM

## 2024-03-27 RX ORDER — DULAGLUTIDE 1.5 MG/.5ML
1.5 INJECTION, SOLUTION SUBCUTANEOUS
Qty: 2 ML | Refills: 2 | Status: SHIPPED | OUTPATIENT
Start: 2024-03-27 | End: 2024-03-28 | Stop reason: SDUPTHER

## 2024-03-27 NOTE — TELEPHONE ENCOUNTER
Spoke to teddy (patient) as well as barry (wife) to give results, per dr watts A1C jumped up to 10.0. He should increase his lantus to 16 units per day and I sent in a higher dose of the trulicity.

## 2024-03-28 DIAGNOSIS — E11.51 TYPE 2 DIABETES MELLITUS WITH DIABETIC PERIPHERAL ANGIOPATHY WITHOUT GANGRENE, WITH LONG-TERM CURRENT USE OF INSULIN (MULTI): ICD-10-CM

## 2024-03-28 DIAGNOSIS — Z79.4 TYPE 2 DIABETES MELLITUS WITH DIABETIC PERIPHERAL ANGIOPATHY WITHOUT GANGRENE, WITH LONG-TERM CURRENT USE OF INSULIN (MULTI): ICD-10-CM

## 2024-03-28 RX ORDER — DULAGLUTIDE 1.5 MG/.5ML
1.5 INJECTION, SOLUTION SUBCUTANEOUS
Qty: 2 ML | Refills: 2 | Status: SHIPPED | OUTPATIENT
Start: 2024-03-28

## 2024-04-03 ENCOUNTER — HOME CARE VISIT (OUTPATIENT)
Dept: HOME HEALTH SERVICES | Facility: HOME HEALTH | Age: 84
End: 2024-04-03
Payer: MEDICARE

## 2024-04-03 VITALS
BODY MASS INDEX: 31.86 KG/M2 | SYSTOLIC BLOOD PRESSURE: 122 MMHG | HEART RATE: 68 BPM | RESPIRATION RATE: 18 BRPM | WEIGHT: 227.57 LBS | OXYGEN SATURATION: 98 % | HEIGHT: 71 IN | DIASTOLIC BLOOD PRESSURE: 74 MMHG | TEMPERATURE: 97.5 F

## 2024-04-03 PROCEDURE — 400014 HH F/U

## 2024-04-03 PROCEDURE — G0300 HHS/HOSPICE OF LPN EA 15 MIN: HCPCS

## 2024-04-03 ASSESSMENT — PAIN SCALES - PAIN ASSESSMENT IN ADVANCED DEMENTIA (PAINAD)
TOTALSCORE: 0
CONSOLABILITY: 0 - NO NEED TO CONSOLE.
CONSOLABILITY: 0
FACIALEXPRESSION: 0 - SMILING OR INEXPRESSIVE.
BODYLANGUAGE: 0
BREATHING: 0
NEGVOCALIZATION: 0
BODYLANGUAGE: 0 - RELAXED.
NEGVOCALIZATION: 0 - NONE.
FACIALEXPRESSION: 0

## 2024-04-03 ASSESSMENT — ENCOUNTER SYMPTOMS
STOOL FREQUENCY: DAILY
LAST BOWEL MOVEMENT: 66933
LOWEST PAIN SEVERITY IN PAST 24 HOURS: 0/10
DRY SKIN: 1
LIMITED RANGE OF MOTION: 1
FATIGUE: 1
MUSCLE WEAKNESS: 1
SUBJECTIVE PAIN PROGRESSION: UNCHANGED
FATIGUES EASILY: 1
PAIN SEVERITY GOAL: 0/10
DENIES PAIN: 1
FORGETFULNESS: 1
APPETITE LEVEL: GOOD
CHANGE IN APPETITE: UNCHANGED
HIGHEST PAIN SEVERITY IN PAST 24 HOURS: 0/10
DYSPNEA ON EXERTION: 1
PERSON REPORTING PAIN: PATIENT
BOWEL PATTERN NORMAL: 1

## 2024-04-03 ASSESSMENT — ACTIVITIES OF DAILY LIVING (ADL): MONEY MANAGEMENT (EXPENSES/BILLS): INDEPENDENT

## 2024-04-03 NOTE — HOME HEALTH
Patient seen today for routine visit and monitoring of suprapubic catheter. All VS WNL. Lung sounds CTA. Respirations even and unlabored. Patient denies pain or discomfort. Appetite good. Denies any issues moving bowels. Suprapubic catheter observed to be intact and patent with clear yellow-colored urine flowing freely to leg bag. Catheter insertion site is clean, dry and pink. Next catheter change is due 4/20/24.

## 2024-04-04 ENCOUNTER — APPOINTMENT (OUTPATIENT)
Dept: PRIMARY CARE | Facility: CLINIC | Age: 84
End: 2024-04-04
Payer: MEDICARE

## 2024-04-09 ENCOUNTER — HOME CARE VISIT (OUTPATIENT)
Dept: HOME HEALTH SERVICES | Facility: HOME HEALTH | Age: 84
End: 2024-04-09
Payer: MEDICARE

## 2024-04-18 ENCOUNTER — HOME CARE VISIT (OUTPATIENT)
Dept: HOME HEALTH SERVICES | Facility: HOME HEALTH | Age: 84
End: 2024-04-18
Payer: MEDICARE

## 2024-04-18 VITALS
HEART RATE: 77 BPM | TEMPERATURE: 98.6 F | OXYGEN SATURATION: 97 % | RESPIRATION RATE: 18 BRPM | SYSTOLIC BLOOD PRESSURE: 120 MMHG | DIASTOLIC BLOOD PRESSURE: 70 MMHG

## 2024-04-18 PROCEDURE — G0299 HHS/HOSPICE OF RN EA 15 MIN: HCPCS

## 2024-04-18 ASSESSMENT — ENCOUNTER SYMPTOMS
APPETITE LEVEL: FAIR
LOWEST PAIN SEVERITY IN PAST 24 HOURS: 0/10
PAIN SEVERITY GOAL: 0/10
HIGHEST PAIN SEVERITY IN PAST 24 HOURS: 0/10
LOWER EXTREMITY EDEMA: 1
MUSCLE WEAKNESS: 1

## 2024-04-18 ASSESSMENT — ACTIVITIES OF DAILY LIVING (ADL)
ENTERING_EXITING_HOME: NEEDS ASSISTANCE
OASIS_M1830: 03

## 2024-04-18 ASSESSMENT — PAIN SCALES - PAIN ASSESSMENT IN ADVANCED DEMENTIA (PAINAD): BREATHING: 0

## 2024-04-20 PROCEDURE — 400014 HH F/U

## 2024-04-24 PROCEDURE — G0179 MD RECERTIFICATION HHA PT: HCPCS | Performed by: UROLOGY

## 2024-04-27 ENCOUNTER — HOME CARE VISIT (OUTPATIENT)
Dept: HOME HEALTH SERVICES | Facility: HOME HEALTH | Age: 84
End: 2024-04-27
Payer: MEDICARE

## 2024-04-27 VITALS
SYSTOLIC BLOOD PRESSURE: 154 MMHG | RESPIRATION RATE: 18 BRPM | DIASTOLIC BLOOD PRESSURE: 86 MMHG | HEART RATE: 80 BPM | TEMPERATURE: 99.1 F | OXYGEN SATURATION: 97 %

## 2024-04-27 PROCEDURE — 400014 HH F/U

## 2024-04-27 PROCEDURE — G0299 HHS/HOSPICE OF RN EA 15 MIN: HCPCS

## 2024-04-27 SDOH — ECONOMIC STABILITY: GENERAL

## 2024-04-27 ASSESSMENT — ENCOUNTER SYMPTOMS
LOWER EXTREMITY EDEMA: 1
CHANGE IN APPETITE: UNCHANGED
APPETITE LEVEL: GOOD
CONSTIPATION: 1
DENIES PAIN: 1
MUSCLE WEAKNESS: 1
HYPERTENSION: 1
LAST BOWEL MOVEMENT: 66956
PERSON REPORTING PAIN: PATIENT

## 2024-04-27 ASSESSMENT — ACTIVITIES OF DAILY LIVING (ADL)
AMBULATION ASSISTANCE: STAND BY ASSIST
CURRENT_FUNCTION: STAND BY ASSIST

## 2024-05-18 ENCOUNTER — HOME CARE VISIT (OUTPATIENT)
Dept: HOME HEALTH SERVICES | Facility: HOME HEALTH | Age: 84
End: 2024-05-18
Payer: MEDICARE

## 2024-05-18 VITALS
SYSTOLIC BLOOD PRESSURE: 138 MMHG | RESPIRATION RATE: 18 BRPM | DIASTOLIC BLOOD PRESSURE: 82 MMHG | OXYGEN SATURATION: 98 % | TEMPERATURE: 98.1 F | HEART RATE: 82 BPM

## 2024-05-18 PROCEDURE — G0300 HHS/HOSPICE OF LPN EA 15 MIN: HCPCS

## 2024-05-18 ASSESSMENT — ENCOUNTER SYMPTOMS
HYPERTENSION: 1
BOWEL PATTERN NORMAL: 1
FATIGUES EASILY: 1
STOOL FREQUENCY: DAILY
APPETITE LEVEL: FAIR
PAIN SEVERITY GOAL: 0/10
LOWER EXTREMITY EDEMA: 1
CHANGE IN APPETITE: UNCHANGED
LAST BOWEL MOVEMENT: 66978
DRY SKIN: 1
LOWEST PAIN SEVERITY IN PAST 24 HOURS: 0/10
MUSCLE WEAKNESS: 1
HIGHEST PAIN SEVERITY IN PAST 24 HOURS: 0/10
DENIES PAIN: 1
PERSON REPORTING PAIN: PATIENT
SUBJECTIVE PAIN PROGRESSION: UNCHANGED

## 2024-05-18 ASSESSMENT — PAIN SCALES - PAIN ASSESSMENT IN ADVANCED DEMENTIA (PAINAD)
CONSOLABILITY: 0
BODYLANGUAGE: 0 - RELAXED.
NEGVOCALIZATION: 0
FACIALEXPRESSION: 0 - SMILING OR INEXPRESSIVE.
BREATHING: 0
NEGVOCALIZATION: 0 - NONE.
FACIALEXPRESSION: 0
BODYLANGUAGE: 0
TOTALSCORE: 0
CONSOLABILITY: 0 - NO NEED TO CONSOLE.

## 2024-05-18 ASSESSMENT — ACTIVITIES OF DAILY LIVING (ADL): MONEY MANAGEMENT (EXPENSES/BILLS): INDEPENDENT

## 2024-05-18 NOTE — HOME HEALTH
Indwelling suprapubic catheter change today without complication. New. #16F silicone catheter inserted, balloon inflated with 10ml SNS. Clear straw-colored urine observed to be flowing freely through catheter tubing to leg bag. Patient tolerated procedure well with no c/o pain or discomfort. All VS WNL. Supplies ordered.

## 2024-05-22 ENCOUNTER — HOME CARE VISIT (OUTPATIENT)
Dept: HOME HEALTH SERVICES | Facility: HOME HEALTH | Age: 84
End: 2024-05-22
Payer: MEDICARE

## 2024-06-11 ENCOUNTER — OFFICE VISIT (OUTPATIENT)
Dept: PRIMARY CARE | Facility: CLINIC | Age: 84
End: 2024-06-11
Payer: MEDICARE

## 2024-06-11 ENCOUNTER — LAB (OUTPATIENT)
Dept: LAB | Facility: LAB | Age: 84
End: 2024-06-11
Payer: MEDICARE

## 2024-06-11 VITALS
HEART RATE: 75 BPM | SYSTOLIC BLOOD PRESSURE: 145 MMHG | BODY MASS INDEX: 34.3 KG/M2 | OXYGEN SATURATION: 93 % | WEIGHT: 245 LBS | DIASTOLIC BLOOD PRESSURE: 90 MMHG | HEIGHT: 71 IN

## 2024-06-11 DIAGNOSIS — I50.22 CHRONIC SYSTOLIC HEART FAILURE (MULTI): ICD-10-CM

## 2024-06-11 DIAGNOSIS — E11.22 TYPE 2 DIABETES MELLITUS WITH STAGE 3B CHRONIC KIDNEY DISEASE, WITH LONG-TERM CURRENT USE OF INSULIN (MULTI): ICD-10-CM

## 2024-06-11 DIAGNOSIS — N18.32 TYPE 2 DIABETES MELLITUS WITH STAGE 3B CHRONIC KIDNEY DISEASE, WITH LONG-TERM CURRENT USE OF INSULIN (MULTI): ICD-10-CM

## 2024-06-11 DIAGNOSIS — Z79.4 TYPE 2 DIABETES MELLITUS WITH STAGE 3B CHRONIC KIDNEY DISEASE, WITH LONG-TERM CURRENT USE OF INSULIN (MULTI): ICD-10-CM

## 2024-06-11 DIAGNOSIS — C61 PROSTATE CANCER (MULTI): Primary | ICD-10-CM

## 2024-06-11 LAB
ERYTHROCYTE [DISTWIDTH] IN BLOOD BY AUTOMATED COUNT: 13.4 % (ref 11.5–14.5)
HCT VFR BLD AUTO: 42.6 % (ref 41–52)
HGB BLD-MCNC: 12.9 G/DL (ref 13.5–17.5)
MCH RBC QN AUTO: 27.7 PG (ref 26–34)
MCHC RBC AUTO-ENTMCNC: 30.3 G/DL (ref 32–36)
MCV RBC AUTO: 92 FL (ref 80–100)
NRBC BLD-RTO: 0 /100 WBCS (ref 0–0)
PLATELET # BLD AUTO: 238 X10*3/UL (ref 150–450)
RBC # BLD AUTO: 4.65 X10*6/UL (ref 4.5–5.9)
WBC # BLD AUTO: 8.2 X10*3/UL (ref 4.4–11.3)

## 2024-06-11 PROCEDURE — 1123F ACP DISCUSS/DSCN MKR DOCD: CPT | Performed by: INTERNAL MEDICINE

## 2024-06-11 PROCEDURE — 1158F ADVNC CARE PLAN TLK DOCD: CPT | Performed by: INTERNAL MEDICINE

## 2024-06-11 PROCEDURE — 80053 COMPREHEN METABOLIC PANEL: CPT

## 2024-06-11 PROCEDURE — 36415 COLL VENOUS BLD VENIPUNCTURE: CPT

## 2024-06-11 PROCEDURE — 99214 OFFICE O/P EST MOD 30 MIN: CPT | Performed by: INTERNAL MEDICINE

## 2024-06-11 PROCEDURE — 3080F DIAST BP >= 90 MM HG: CPT | Performed by: INTERNAL MEDICINE

## 2024-06-11 PROCEDURE — 3077F SYST BP >= 140 MM HG: CPT | Performed by: INTERNAL MEDICINE

## 2024-06-11 PROCEDURE — 1157F ADVNC CARE PLAN IN RCRD: CPT | Performed by: INTERNAL MEDICINE

## 2024-06-11 PROCEDURE — 1036F TOBACCO NON-USER: CPT | Performed by: INTERNAL MEDICINE

## 2024-06-11 PROCEDURE — 1159F MED LIST DOCD IN RCRD: CPT | Performed by: INTERNAL MEDICINE

## 2024-06-11 PROCEDURE — G2211 COMPLEX E/M VISIT ADD ON: HCPCS | Performed by: INTERNAL MEDICINE

## 2024-06-11 PROCEDURE — 85027 COMPLETE CBC AUTOMATED: CPT

## 2024-06-11 RX ORDER — INSULIN GLARGINE 100 [IU]/ML
20 INJECTION, SOLUTION SUBCUTANEOUS EVERY MORNING
Qty: 6 ML | Refills: 1 | Status: SHIPPED | OUTPATIENT
Start: 2024-06-11 | End: 2024-10-09

## 2024-06-11 NOTE — PROGRESS NOTES
"Subjective   Patient ID: Dennis Galo is a 84 y.o. male who presents for Follow-up.    HPI   Trulicity was on backorder and was out of it for 1 month. Has been taking the past 2 months.  Taking 16 units of the lantus.  FBS around 200.  Still eating a lot of donuts and cookies and ice cream.    Weight is up 20 pounds  Legs a little more swollen  Denies changes in dyspnea.    Review of Systems    Objective   /90 (BP Location: Left arm)   Pulse 75   Ht 1.791 m (5' 10.5\")   Wt 111 kg (245 lb)   SpO2 93%   BMI 34.66 kg/m²     Physical Exam  Cardiovascular:      Rate and Rhythm: Normal rate and regular rhythm.      Heart sounds: No murmur heard.  Pulmonary:      Effort: Pulmonary effort is normal.      Breath sounds: Normal breath sounds.   Musculoskeletal:      Right lower leg: Edema (2+) present.      Left lower leg: Edema (2+) present.   Skin:     Comments: Noninfectious blister L anterior shin  Neurological:      Mental Status: He is alert.   Assessment/Plan   Problem List Items Addressed This Visit             ICD-10-CM    Prostate cancer (Multi) - Primary C61    Relevant Orders    Referral to Urology    Systolic heart failure (Multi) I50.20    Relevant Medications    torsemide 60 mg tablet    Other Relevant Orders    Comprehensive Metabolic Panel    CBC    Type 2 diabetes mellitus with chronic kidney disease, with long-term current use of insulin (Multi) E11.22, Z79.4    Relevant Medications    insulin glargine (Lantus Solostar U-100 Insulin) 100 unit/mL (3 mL) pen          Urinary stable. Follow up with Urology.  DM. Uncontrolled. Increase lantus at 20 units daily, continue GLP-1, continue januvia.  CHF, CKD - decompensated, increase torsemide to 60mg/day.  Seeing Renal, Dr Bess.  Check labs today     Follow up 3 weeks  "

## 2024-06-12 ENCOUNTER — HOME CARE VISIT (OUTPATIENT)
Dept: HOME HEALTH SERVICES | Facility: HOME HEALTH | Age: 84
End: 2024-06-12
Payer: MEDICARE

## 2024-06-12 VITALS
SYSTOLIC BLOOD PRESSURE: 142 MMHG | TEMPERATURE: 98.1 F | RESPIRATION RATE: 18 BRPM | HEART RATE: 78 BPM | DIASTOLIC BLOOD PRESSURE: 84 MMHG | OXYGEN SATURATION: 94 %

## 2024-06-12 LAB
ALBUMIN SERPL BCP-MCNC: 3.6 G/DL (ref 3.4–5)
ALP SERPL-CCNC: 133 U/L (ref 33–136)
ALT SERPL W P-5'-P-CCNC: 10 U/L (ref 10–52)
ANION GAP SERPL CALC-SCNC: 15 MMOL/L (ref 10–20)
AST SERPL W P-5'-P-CCNC: 13 U/L (ref 9–39)
BILIRUB SERPL-MCNC: 1.3 MG/DL (ref 0–1.2)
BUN SERPL-MCNC: 22 MG/DL (ref 6–23)
CALCIUM SERPL-MCNC: 9 MG/DL (ref 8.6–10.6)
CHLORIDE SERPL-SCNC: 99 MMOL/L (ref 98–107)
CO2 SERPL-SCNC: 31 MMOL/L (ref 21–32)
CREAT SERPL-MCNC: 1.52 MG/DL (ref 0.5–1.3)
EGFRCR SERPLBLD CKD-EPI 2021: 45 ML/MIN/1.73M*2
GLUCOSE SERPL-MCNC: 205 MG/DL (ref 74–99)
POTASSIUM SERPL-SCNC: 4.1 MMOL/L (ref 3.5–5.3)
PROT SERPL-MCNC: 6.5 G/DL (ref 6.4–8.2)
SODIUM SERPL-SCNC: 141 MMOL/L (ref 136–145)

## 2024-06-12 PROCEDURE — G0300 HHS/HOSPICE OF LPN EA 15 MIN: HCPCS

## 2024-06-12 ASSESSMENT — ENCOUNTER SYMPTOMS
MUSCLE WEAKNESS: 1
APPETITE LEVEL: GOOD
DRY SKIN: 1
LOWEST PAIN SEVERITY IN PAST 24 HOURS: 0/10
LAST BOWEL MOVEMENT: 67003
HIGHEST PAIN SEVERITY IN PAST 24 HOURS: 0/10
SUBJECTIVE PAIN PROGRESSION: UNCHANGED
CHANGE IN APPETITE: UNCHANGED
PAIN SEVERITY GOAL: 0/10
DENIES PAIN: 1
PERSON REPORTING PAIN: PATIENT
BOWEL PATTERN NORMAL: 1
LOWER EXTREMITY EDEMA: 1
STOOL FREQUENCY: DAILY

## 2024-06-12 ASSESSMENT — ACTIVITIES OF DAILY LIVING (ADL): MONEY MANAGEMENT (EXPENSES/BILLS): INDEPENDENT

## 2024-06-12 ASSESSMENT — PAIN SCALES - PAIN ASSESSMENT IN ADVANCED DEMENTIA (PAINAD)
CONSOLABILITY: 0
BODYLANGUAGE: 0 - RELAXED.
FACIALEXPRESSION: 0
FACIALEXPRESSION: 0 - SMILING OR INEXPRESSIVE.
NEGVOCALIZATION: 0 - NONE.
BREATHING: 0
NEGVOCALIZATION: 0
CONSOLABILITY: 0 - NO NEED TO CONSOLE.
TOTALSCORE: 0
BODYLANGUAGE: 0

## 2024-06-12 NOTE — HOME HEALTH
Suprapubic catheter changed without complication. Using sterile procedure #16F silicone Deal catheter inserted, balloon inflated with 10cc SNS, clear yellow-colored fluid observed to be flowing freely through catheter tubing to leg bag. Patient tolerated procedure well with no complaints of pain or discomfort. All VS WNL.

## 2024-06-14 ENCOUNTER — TELEPHONE (OUTPATIENT)
Dept: PRIMARY CARE | Facility: CLINIC | Age: 84
End: 2024-06-14
Payer: MEDICARE

## 2024-06-14 NOTE — TELEPHONE ENCOUNTER
----- Message from Triston Samson MD sent at 6/12/2024  8:13 AM EDT -----  Labs all stable, kidney function in the same range. He should increase the torsemide to 60mg/day as we discussed and Ill see him in a few weeks.

## 2024-06-14 NOTE — TELEPHONE ENCOUNTER
Spoke to patient to give results : per dr watts Labs all stable, kidney function in the same range. He should increase the torsemide to 60mg/day as we discussed and Ill see him in a few weeks.

## 2024-06-15 ENCOUNTER — HOME CARE VISIT (OUTPATIENT)
Dept: HOME HEALTH SERVICES | Facility: HOME HEALTH | Age: 84
End: 2024-06-15
Payer: MEDICARE

## 2024-06-15 VITALS
RESPIRATION RATE: 18 BRPM | SYSTOLIC BLOOD PRESSURE: 140 MMHG | OXYGEN SATURATION: 95 % | TEMPERATURE: 98.6 F | DIASTOLIC BLOOD PRESSURE: 80 MMHG | HEART RATE: 71 BPM

## 2024-06-15 PROCEDURE — G0299 HHS/HOSPICE OF RN EA 15 MIN: HCPCS

## 2024-06-15 ASSESSMENT — ACTIVITIES OF DAILY LIVING (ADL)
OASIS_M1830: 02
ENTERING_EXITING_HOME: NEEDS ASSISTANCE

## 2024-06-15 ASSESSMENT — ENCOUNTER SYMPTOMS
PAIN SEVERITY GOAL: 0/10
MUSCLE WEAKNESS: 1
LOWER EXTREMITY EDEMA: 1
APPETITE LEVEL: FAIR
LOWEST PAIN SEVERITY IN PAST 24 HOURS: 0/10
HIGHEST PAIN SEVERITY IN PAST 24 HOURS: 0/10

## 2024-06-15 ASSESSMENT — PAIN SCALES - PAIN ASSESSMENT IN ADVANCED DEMENTIA (PAINAD): BREATHING: 0

## 2024-06-29 ENCOUNTER — HOME CARE VISIT (OUTPATIENT)
Dept: HOME HEALTH SERVICES | Facility: HOME HEALTH | Age: 84
End: 2024-06-29
Payer: MEDICARE

## 2024-07-08 DIAGNOSIS — I25.10 CORONARY ARTERY DISEASE INVOLVING NATIVE CORONARY ARTERY OF NATIVE HEART WITHOUT ANGINA PECTORIS: Primary | ICD-10-CM

## 2024-07-08 RX ORDER — ATORVASTATIN CALCIUM 80 MG/1
80 TABLET, FILM COATED ORAL DAILY
Qty: 90 TABLET | Refills: 3 | Status: SHIPPED | OUTPATIENT
Start: 2024-07-08 | End: 2025-07-08

## 2024-07-09 ENCOUNTER — APPOINTMENT (OUTPATIENT)
Dept: PRIMARY CARE | Facility: CLINIC | Age: 84
End: 2024-07-09
Payer: MEDICARE

## 2024-07-09 VITALS
OXYGEN SATURATION: 93 % | HEIGHT: 70 IN | SYSTOLIC BLOOD PRESSURE: 142 MMHG | DIASTOLIC BLOOD PRESSURE: 86 MMHG | HEART RATE: 75 BPM | BODY MASS INDEX: 33.64 KG/M2 | WEIGHT: 235 LBS

## 2024-07-09 DIAGNOSIS — Z79.4 TYPE 2 DIABETES MELLITUS WITH DIABETIC PERIPHERAL ANGIOPATHY WITHOUT GANGRENE, WITH LONG-TERM CURRENT USE OF INSULIN (MULTI): ICD-10-CM

## 2024-07-09 DIAGNOSIS — I50.22 CHRONIC SYSTOLIC HEART FAILURE (MULTI): ICD-10-CM

## 2024-07-09 DIAGNOSIS — T14.8XXA WOUND OF SKIN: ICD-10-CM

## 2024-07-09 DIAGNOSIS — E11.22 TYPE 2 DIABETES MELLITUS WITH STAGE 3B CHRONIC KIDNEY DISEASE, WITH LONG-TERM CURRENT USE OF INSULIN (MULTI): Primary | ICD-10-CM

## 2024-07-09 DIAGNOSIS — N18.32 TYPE 2 DIABETES MELLITUS WITH STAGE 3B CHRONIC KIDNEY DISEASE, WITH LONG-TERM CURRENT USE OF INSULIN (MULTI): Primary | ICD-10-CM

## 2024-07-09 DIAGNOSIS — Z79.4 TYPE 2 DIABETES MELLITUS WITH STAGE 3B CHRONIC KIDNEY DISEASE, WITH LONG-TERM CURRENT USE OF INSULIN (MULTI): Primary | ICD-10-CM

## 2024-07-09 DIAGNOSIS — E11.51 TYPE 2 DIABETES MELLITUS WITH DIABETIC PERIPHERAL ANGIOPATHY WITHOUT GANGRENE, WITH LONG-TERM CURRENT USE OF INSULIN (MULTI): ICD-10-CM

## 2024-07-09 DIAGNOSIS — R60.0 BILATERAL EDEMA OF LOWER EXTREMITY: ICD-10-CM

## 2024-07-09 LAB — POC HEMOGLOBIN A1C: 9.3 % (ref 4.2–6.5)

## 2024-07-09 PROCEDURE — 99214 OFFICE O/P EST MOD 30 MIN: CPT | Performed by: INTERNAL MEDICINE

## 2024-07-09 PROCEDURE — 3079F DIAST BP 80-89 MM HG: CPT | Performed by: INTERNAL MEDICINE

## 2024-07-09 PROCEDURE — 1158F ADVNC CARE PLAN TLK DOCD: CPT | Performed by: INTERNAL MEDICINE

## 2024-07-09 PROCEDURE — 3077F SYST BP >= 140 MM HG: CPT | Performed by: INTERNAL MEDICINE

## 2024-07-09 PROCEDURE — 1159F MED LIST DOCD IN RCRD: CPT | Performed by: INTERNAL MEDICINE

## 2024-07-09 PROCEDURE — 1036F TOBACCO NON-USER: CPT | Performed by: INTERNAL MEDICINE

## 2024-07-09 PROCEDURE — G2211 COMPLEX E/M VISIT ADD ON: HCPCS | Performed by: INTERNAL MEDICINE

## 2024-07-09 PROCEDURE — 1157F ADVNC CARE PLAN IN RCRD: CPT | Performed by: INTERNAL MEDICINE

## 2024-07-09 PROCEDURE — 1123F ACP DISCUSS/DSCN MKR DOCD: CPT | Performed by: INTERNAL MEDICINE

## 2024-07-09 PROCEDURE — 83036 HEMOGLOBIN GLYCOSYLATED A1C: CPT | Performed by: INTERNAL MEDICINE

## 2024-07-09 RX ORDER — TORSEMIDE 20 MG/1
60 TABLET ORAL DAILY
Qty: 270 TABLET | Refills: 1 | Status: SHIPPED | OUTPATIENT
Start: 2024-07-09 | End: 2025-01-05

## 2024-07-09 RX ORDER — INSULIN GLARGINE 100 [IU]/ML
24 INJECTION, SOLUTION SUBCUTANEOUS EVERY MORNING
Qty: 6 ML | Refills: 1 | Status: SHIPPED | OUTPATIENT
Start: 2024-07-09 | End: 2024-11-06

## 2024-07-09 ASSESSMENT — PATIENT HEALTH QUESTIONNAIRE - PHQ9
1. LITTLE INTEREST OR PLEASURE IN DOING THINGS: NOT AT ALL
SUM OF ALL RESPONSES TO PHQ9 QUESTIONS 1 & 2: 0
2. FEELING DOWN, DEPRESSED OR HOPELESS: NOT AT ALL

## 2024-07-09 NOTE — PATIENT INSTRUCTIONS
Increase lantus to 24 units daily  Also will increase trulicity. I sent a higher dose to your pharmacy. You can take 2 injections once per week of the current 1.5mg pens once per week to get rid of these. Then start the 3.0mg injection once per week.

## 2024-07-09 NOTE — PROGRESS NOTES
"Subjective   Patient ID: Dennis Galo is a 84 y.o. male who presents for Follow-up.    HPI   Weight down 10 pounds  On torsemide 60mg/day  Pt unsure if legs any less swollen. Has blisters on legs.    FBS 160s  Diet noncompliant  Sees Podiatry every 3 months    Still has urinary catheter. Hasn't seen Urology.    Review of Systems    Objective   /86 (BP Location: Right arm, Patient Position: Sitting)   Pulse 75   Ht 1.778 m (5' 10\")   Wt 107 kg (235 lb)   SpO2 93%   BMI 33.72 kg/m²     Physical Exam  Cardiovascular:      Rate and Rhythm: Normal rate and regular rhythm.      Heart sounds: No murmur heard.  Pulmonary:      Effort: Pulmonary effort is normal.      Breath sounds: Normal breath sounds.   Musculoskeletal:      Right lower leg: Edema (2+) present.      Left lower leg: Edema (2+) present.   Skin:     Comments: Noninfectious blisters bilateral anterior shin  Neurological:      Mental Status: He is alert.     Assessment/Plan   Problem List Items Addressed This Visit             ICD-10-CM    Bilateral edema of lower extremity R60.0    Systolic heart failure (Multi) I50.20    Relevant Medications    torsemide (Demadex) 20 mg tablet    Wound of skin T14.8XXA    Type 2 diabetes mellitus with chronic kidney disease, with long-term current use of insulin (Multi) - Primary E11.22, Z79.4    Relevant Medications    insulin glargine (Lantus Solostar U-100 Insulin) 100 unit/mL (3 mL) pen    dulaglutide 3 mg/0.5 mL pen injector (Start on 7/14/2024)    Other Relevant Orders    POCT glycosylated hemoglobin (Hb A1C) manually resulted (Completed)    Type 2 diabetes mellitus with diabetic peripheral angiopathy without gangrene, with long-term current use of insulin (Multi) E11.51, Z79.4        Urinary stable. Follow up with Urology.    DM. Uncontrolled but trending in right direction. Increase lantus at 24 units daily, increase GLP-1, continue januvia.     CHF, CKD - edema present but improved. Continue " torsemide 60mg/day.  Seeing Renal, Dr Bess.     Follow up 6 weeks

## 2024-07-12 ENCOUNTER — HOME CARE VISIT (OUTPATIENT)
Dept: HOME HEALTH SERVICES | Facility: HOME HEALTH | Age: 84
End: 2024-07-12
Payer: MEDICARE

## 2024-07-12 VITALS
RESPIRATION RATE: 18 BRPM | DIASTOLIC BLOOD PRESSURE: 72 MMHG | TEMPERATURE: 98.1 F | OXYGEN SATURATION: 97 % | SYSTOLIC BLOOD PRESSURE: 140 MMHG | HEART RATE: 72 BPM

## 2024-07-12 PROCEDURE — G0300 HHS/HOSPICE OF LPN EA 15 MIN: HCPCS

## 2024-07-12 ASSESSMENT — ENCOUNTER SYMPTOMS
HIGHEST PAIN SEVERITY IN PAST 24 HOURS: 0/10
APPETITE LEVEL: GOOD
LAST BOWEL MOVEMENT: 67033
LOWER EXTREMITY EDEMA: 1
DENIES PAIN: 1
MUSCLE WEAKNESS: 1
BOWEL PATTERN NORMAL: 1
STOOL FREQUENCY: DAILY
SUBJECTIVE PAIN PROGRESSION: UNCHANGED
LOWEST PAIN SEVERITY IN PAST 24 HOURS: 0/10
PERSON REPORTING PAIN: PATIENT
CHANGE IN APPETITE: UNCHANGED
PAIN SEVERITY GOAL: 0/10

## 2024-07-12 ASSESSMENT — PAIN SCALES - PAIN ASSESSMENT IN ADVANCED DEMENTIA (PAINAD)
NEGVOCALIZATION: 0
CONSOLABILITY: 0 - NO NEED TO CONSOLE.
CONSOLABILITY: 0
BODYLANGUAGE: 0
FACIALEXPRESSION: 0
TOTALSCORE: 0
FACIALEXPRESSION: 0 - SMILING OR INEXPRESSIVE.
BREATHING: 0
NEGVOCALIZATION: 0 - NONE.
BODYLANGUAGE: 0 - RELAXED.

## 2024-07-12 ASSESSMENT — ACTIVITIES OF DAILY LIVING (ADL): MONEY MANAGEMENT (EXPENSES/BILLS): INDEPENDENT

## 2024-07-12 NOTE — HOME HEALTH
Patient seen today for routine suprapubic catheter change. New #16F liu suprapubic catheter inserted, balloon inflated with 10ml SNS, without complication. Patient tolerated procedure well. Clear pale yellow-colored urine observed to be flowing freely through catheter tubing to leg bag. All VS WNL.

## 2024-07-17 DIAGNOSIS — N18.32 TYPE 2 DIABETES MELLITUS WITH STAGE 3B CHRONIC KIDNEY DISEASE, WITH LONG-TERM CURRENT USE OF INSULIN (MULTI): ICD-10-CM

## 2024-07-17 DIAGNOSIS — Z79.4 TYPE 2 DIABETES MELLITUS WITH STAGE 3B CHRONIC KIDNEY DISEASE, WITH LONG-TERM CURRENT USE OF INSULIN (MULTI): ICD-10-CM

## 2024-07-17 DIAGNOSIS — E11.22 TYPE 2 DIABETES MELLITUS WITH STAGE 3B CHRONIC KIDNEY DISEASE, WITH LONG-TERM CURRENT USE OF INSULIN (MULTI): ICD-10-CM

## 2024-07-18 RX ORDER — BLOOD SUGAR DIAGNOSTIC
STRIP MISCELLANEOUS
Qty: 100 STRIP | Refills: 1 | Status: SHIPPED | OUTPATIENT
Start: 2024-07-18

## 2024-07-18 RX ORDER — ISOSORBIDE MONONITRATE 30 MG/1
30 TABLET, EXTENDED RELEASE ORAL DAILY
Qty: 90 TABLET | Refills: 3 | Status: SHIPPED | OUTPATIENT
Start: 2024-07-18 | End: 2025-07-18

## 2024-07-26 ENCOUNTER — HOME CARE VISIT (OUTPATIENT)
Dept: HOME HEALTH SERVICES | Facility: HOME HEALTH | Age: 84
End: 2024-07-26
Payer: MEDICARE

## 2024-08-07 ENCOUNTER — HOME CARE VISIT (OUTPATIENT)
Dept: HOME HEALTH SERVICES | Facility: HOME HEALTH | Age: 84
End: 2024-08-07
Payer: MEDICARE

## 2024-08-07 VITALS
SYSTOLIC BLOOD PRESSURE: 136 MMHG | TEMPERATURE: 97.9 F | HEART RATE: 72 BPM | DIASTOLIC BLOOD PRESSURE: 78 MMHG | RESPIRATION RATE: 18 BRPM | OXYGEN SATURATION: 97 %

## 2024-08-07 PROCEDURE — G0300 HHS/HOSPICE OF LPN EA 15 MIN: HCPCS

## 2024-08-07 ASSESSMENT — ENCOUNTER SYMPTOMS
PAIN SEVERITY GOAL: 0/10
PERSON REPORTING PAIN: PATIENT
APPETITE LEVEL: GOOD
DIARRHEA: 1
CHANGE IN APPETITE: UNCHANGED
HIGHEST PAIN SEVERITY IN PAST 24 HOURS: 2/10
LAST BOWEL MOVEMENT: 67059
LOWEST PAIN SEVERITY IN PAST 24 HOURS: 0/10
DENIES PAIN: 1
FATIGUES EASILY: 1
MUSCLE WEAKNESS: 1
LOWER EXTREMITY EDEMA: 1

## 2024-08-07 ASSESSMENT — ACTIVITIES OF DAILY LIVING (ADL): MONEY MANAGEMENT (EXPENSES/BILLS): INDEPENDENT

## 2024-08-07 NOTE — HOME HEALTH
Patient seen for routine suprapubic catheter change. Old catheter removed Without complication. Inserted #16F catheter inserted, balloon inflated with 10ml SNS, without complication. Patient tolerated procedure well. All VS WNL.

## 2024-08-15 ENCOUNTER — HOME CARE VISIT (OUTPATIENT)
Dept: HOME HEALTH SERVICES | Facility: HOME HEALTH | Age: 84
End: 2024-08-15
Payer: MEDICARE

## 2024-08-15 VITALS
SYSTOLIC BLOOD PRESSURE: 130 MMHG | TEMPERATURE: 98.8 F | RESPIRATION RATE: 18 BRPM | OXYGEN SATURATION: 95 % | DIASTOLIC BLOOD PRESSURE: 70 MMHG | HEART RATE: 67 BPM

## 2024-08-15 PROCEDURE — G0299 HHS/HOSPICE OF RN EA 15 MIN: HCPCS

## 2024-08-15 ASSESSMENT — ENCOUNTER SYMPTOMS
PAIN SEVERITY GOAL: 0/10
APPETITE LEVEL: FAIR
LOWER EXTREMITY EDEMA: 1
MUSCLE WEAKNESS: 1
HIGHEST PAIN SEVERITY IN PAST 24 HOURS: 3/10
LOWEST PAIN SEVERITY IN PAST 24 HOURS: 0/10

## 2024-08-15 ASSESSMENT — ACTIVITIES OF DAILY LIVING (ADL)
ENTERING_EXITING_HOME: NEEDS ASSISTANCE
OASIS_M1830: 03

## 2024-08-15 ASSESSMENT — PAIN SCALES - PAIN ASSESSMENT IN ADVANCED DEMENTIA (PAINAD): BREATHING: 1

## 2024-08-23 ENCOUNTER — APPOINTMENT (OUTPATIENT)
Dept: PRIMARY CARE | Facility: CLINIC | Age: 84
End: 2024-08-23
Payer: MEDICARE

## 2024-08-23 ENCOUNTER — LAB (OUTPATIENT)
Dept: LAB | Facility: LAB | Age: 84
End: 2024-08-23
Payer: MEDICARE

## 2024-08-23 VITALS
BODY MASS INDEX: 31.5 KG/M2 | OXYGEN SATURATION: 94 % | DIASTOLIC BLOOD PRESSURE: 78 MMHG | WEIGHT: 220 LBS | HEART RATE: 67 BPM | HEIGHT: 70 IN | SYSTOLIC BLOOD PRESSURE: 147 MMHG

## 2024-08-23 DIAGNOSIS — I15.2 HYPERTENSION ASSOCIATED WITH DIABETES (MULTI): ICD-10-CM

## 2024-08-23 DIAGNOSIS — C61 PROSTATE CANCER (MULTI): ICD-10-CM

## 2024-08-23 DIAGNOSIS — E11.22 TYPE 2 DIABETES MELLITUS WITH STAGE 3B CHRONIC KIDNEY DISEASE, WITH LONG-TERM CURRENT USE OF INSULIN (MULTI): Primary | ICD-10-CM

## 2024-08-23 DIAGNOSIS — I50.22 CHRONIC SYSTOLIC HEART FAILURE (MULTI): ICD-10-CM

## 2024-08-23 DIAGNOSIS — E11.59 HYPERTENSION ASSOCIATED WITH DIABETES (MULTI): ICD-10-CM

## 2024-08-23 DIAGNOSIS — Z79.4 TYPE 2 DIABETES MELLITUS WITH STAGE 3B CHRONIC KIDNEY DISEASE, WITH LONG-TERM CURRENT USE OF INSULIN (MULTI): Primary | ICD-10-CM

## 2024-08-23 DIAGNOSIS — E66.01 SEVERE OBESITY (BMI 35.0-39.9) WITH COMORBIDITY (MULTI): ICD-10-CM

## 2024-08-23 DIAGNOSIS — N18.32 TYPE 2 DIABETES MELLITUS WITH STAGE 3B CHRONIC KIDNEY DISEASE, WITH LONG-TERM CURRENT USE OF INSULIN (MULTI): Primary | ICD-10-CM

## 2024-08-23 PROBLEM — J47.9 BRONCHIECTASIS, UNCOMPLICATED (MULTI): Status: RESOLVED | Noted: 2023-10-10 | Resolved: 2024-08-23

## 2024-08-23 PROBLEM — N17.9 AKI (ACUTE KIDNEY INJURY) (CMS-HCC): Status: RESOLVED | Noted: 2023-12-13 | Resolved: 2024-08-23

## 2024-08-23 PROBLEM — N39.0 LOWER URINARY TRACT INFECTION: Status: RESOLVED | Noted: 2023-12-09 | Resolved: 2024-08-23

## 2024-08-23 PROCEDURE — 1157F ADVNC CARE PLAN IN RCRD: CPT | Performed by: INTERNAL MEDICINE

## 2024-08-23 PROCEDURE — 85027 COMPLETE CBC AUTOMATED: CPT

## 2024-08-23 PROCEDURE — 3078F DIAST BP <80 MM HG: CPT | Performed by: INTERNAL MEDICINE

## 2024-08-23 PROCEDURE — 3077F SYST BP >= 140 MM HG: CPT | Performed by: INTERNAL MEDICINE

## 2024-08-23 PROCEDURE — 80053 COMPREHEN METABOLIC PANEL: CPT

## 2024-08-23 PROCEDURE — 1036F TOBACCO NON-USER: CPT | Performed by: INTERNAL MEDICINE

## 2024-08-23 PROCEDURE — 1123F ACP DISCUSS/DSCN MKR DOCD: CPT | Performed by: INTERNAL MEDICINE

## 2024-08-23 PROCEDURE — 1159F MED LIST DOCD IN RCRD: CPT | Performed by: INTERNAL MEDICINE

## 2024-08-23 PROCEDURE — 36415 COLL VENOUS BLD VENIPUNCTURE: CPT

## 2024-08-23 PROCEDURE — 1158F ADVNC CARE PLAN TLK DOCD: CPT | Performed by: INTERNAL MEDICINE

## 2024-08-23 PROCEDURE — G0439 PPPS, SUBSEQ VISIT: HCPCS | Performed by: INTERNAL MEDICINE

## 2024-08-23 PROCEDURE — 1170F FXNL STATUS ASSESSED: CPT | Performed by: INTERNAL MEDICINE

## 2024-08-23 PROCEDURE — 99214 OFFICE O/P EST MOD 30 MIN: CPT | Performed by: INTERNAL MEDICINE

## 2024-08-23 ASSESSMENT — ACTIVITIES OF DAILY LIVING (ADL)
GROCERY_SHOPPING: NEEDS ASSISTANCE
TAKING_MEDICATION: NEEDS ASSISTANCE
DRESSING: NEEDS ASSISTANCE
BATHING: NEEDS ASSISTANCE
DOING_HOUSEWORK: NEEDS ASSISTANCE
MANAGING_FINANCES: NEEDS ASSISTANCE

## 2024-08-23 NOTE — PROGRESS NOTES
"Subjective   Patient ID: Dennis Galo is a 84 y.o. male who presents for Follow-up and Medicare Annual Wellness Visit Subsequent.    HPI   Weight down 15 pounds  On torsemide 60mg/day  Leg swelling is improved.  Pt unsure if legs any less swollen. Has blisters on legs.     -150s  Diet is better, lower in sugars than previous.  Sees Podiatry every 3 months     Still has urinary catheter. Hasn't seen Urology.    Review of Systems    Objective   /78 (BP Location: Right arm, Patient Position: Sitting)   Pulse 67   Ht 1.778 m (5' 10\")   Wt 99.8 kg (220 lb)   SpO2 94%   BMI 31.57 kg/m²     Physical Exam  Cardiovascular:      Rate and Rhythm: Normal rate and regular rhythm.      Heart sounds: No murmur heard.  Pulmonary:      Effort: Pulmonary effort is normal.      Breath sounds: Normal breath sounds.   Musculoskeletal:      Right lower leg: Edema (2+) present.      Left lower leg: Edema (2+) present.   Skin:     Comments: Noninfectious blisters bilateral anterior shin and chronic venous stasis changes  Neurological:      Mental Status: He is alert.     Assessment/Plan   Problem List Items Addressed This Visit             ICD-10-CM    Hypertension associated with diabetes (Multi) E11.59, I15.2    Relevant Orders    CBC    Comprehensive Metabolic Panel    Prostate cancer (Multi) C61    Severe obesity (BMI 35.0-39.9) with comorbidity (Multi) E66.01    Systolic heart failure (Multi) I50.20    Type 2 diabetes mellitus with chronic kidney disease, with long-term current use of insulin (Multi) - Primary E11.22, Z79.4        Urinary stable. Pt says he will follow up with Urology if needed.    Prostate cancer, pt has declined treatment thus far.  Pt says he will follow up with Urology if needed.     DM. Last A1C improved from prior. Continue lantus at 24 units daily, continue GLP-1, continue januvia.  Check A1C next visit.     CHF, CKD - edema present but again improved. Continue torsemide 60mg/day.    "   Check labs  Follow up 2 months

## 2024-08-24 LAB
ALBUMIN SERPL BCP-MCNC: 3.5 G/DL (ref 3.4–5)
ALP SERPL-CCNC: 125 U/L (ref 33–136)
ALT SERPL W P-5'-P-CCNC: 22 U/L (ref 10–52)
ANION GAP SERPL CALC-SCNC: 13 MMOL/L (ref 10–20)
AST SERPL W P-5'-P-CCNC: 20 U/L (ref 9–39)
BILIRUB SERPL-MCNC: 1.3 MG/DL (ref 0–1.2)
BUN SERPL-MCNC: 23 MG/DL (ref 6–23)
CALCIUM SERPL-MCNC: 8.7 MG/DL (ref 8.6–10.6)
CHLORIDE SERPL-SCNC: 99 MMOL/L (ref 98–107)
CO2 SERPL-SCNC: 31 MMOL/L (ref 21–32)
CREAT SERPL-MCNC: 1.7 MG/DL (ref 0.5–1.3)
EGFRCR SERPLBLD CKD-EPI 2021: 40 ML/MIN/1.73M*2
ERYTHROCYTE [DISTWIDTH] IN BLOOD BY AUTOMATED COUNT: 14.5 % (ref 11.5–14.5)
GLUCOSE SERPL-MCNC: 151 MG/DL (ref 74–99)
HCT VFR BLD AUTO: 40.5 % (ref 41–52)
HGB BLD-MCNC: 12.6 G/DL (ref 13.5–17.5)
MCH RBC QN AUTO: 27.9 PG (ref 26–34)
MCHC RBC AUTO-ENTMCNC: 31.1 G/DL (ref 32–36)
MCV RBC AUTO: 90 FL (ref 80–100)
NRBC BLD-RTO: 0 /100 WBCS (ref 0–0)
PLATELET # BLD AUTO: 232 X10*3/UL (ref 150–450)
POTASSIUM SERPL-SCNC: 3.9 MMOL/L (ref 3.5–5.3)
PROT SERPL-MCNC: 6.6 G/DL (ref 6.4–8.2)
RBC # BLD AUTO: 4.51 X10*6/UL (ref 4.5–5.9)
SODIUM SERPL-SCNC: 139 MMOL/L (ref 136–145)
WBC # BLD AUTO: 5.7 X10*3/UL (ref 4.4–11.3)

## 2024-08-27 ENCOUNTER — TELEPHONE (OUTPATIENT)
Dept: PRIMARY CARE | Facility: CLINIC | Age: 84
End: 2024-08-27
Payer: MEDICARE

## 2024-08-27 NOTE — TELEPHONE ENCOUNTER
----- Message from Triston Samson sent at 8/26/2024 10:35 AM EDT -----  Labs all ok, continue same meds.

## 2024-08-28 DIAGNOSIS — N18.32 TYPE 2 DIABETES MELLITUS WITH STAGE 3B CHRONIC KIDNEY DISEASE, WITH LONG-TERM CURRENT USE OF INSULIN (MULTI): ICD-10-CM

## 2024-08-28 DIAGNOSIS — E11.22 TYPE 2 DIABETES MELLITUS WITH STAGE 3B CHRONIC KIDNEY DISEASE, WITH LONG-TERM CURRENT USE OF INSULIN (MULTI): ICD-10-CM

## 2024-08-28 DIAGNOSIS — Z79.4 TYPE 2 DIABETES MELLITUS WITH STAGE 3B CHRONIC KIDNEY DISEASE, WITH LONG-TERM CURRENT USE OF INSULIN (MULTI): ICD-10-CM

## 2024-08-28 RX ORDER — PEN NEEDLE, DIABETIC 30 GX3/16"
NEEDLE, DISPOSABLE MISCELLANEOUS
Qty: 100 EACH | Refills: 2 | Status: SHIPPED | OUTPATIENT
Start: 2024-08-28

## 2024-08-29 ENCOUNTER — HOME CARE VISIT (OUTPATIENT)
Dept: HOME HEALTH SERVICES | Facility: HOME HEALTH | Age: 84
End: 2024-08-29
Payer: MEDICARE

## 2024-09-03 ENCOUNTER — HOME CARE VISIT (OUTPATIENT)
Dept: HOME HEALTH SERVICES | Facility: HOME HEALTH | Age: 84
End: 2024-09-03
Payer: MEDICARE

## 2024-09-03 VITALS
HEART RATE: 68 BPM | SYSTOLIC BLOOD PRESSURE: 132 MMHG | OXYGEN SATURATION: 96 % | RESPIRATION RATE: 18 BRPM | DIASTOLIC BLOOD PRESSURE: 78 MMHG | TEMPERATURE: 98.1 F

## 2024-09-03 PROCEDURE — G0300 HHS/HOSPICE OF LPN EA 15 MIN: HCPCS

## 2024-09-03 ASSESSMENT — ENCOUNTER SYMPTOMS
PERSON REPORTING PAIN: PATIENT
HIGHEST PAIN SEVERITY IN PAST 24 HOURS: 0/10
MUSCLE WEAKNESS: 1
PAIN SEVERITY GOAL: 0/10
LAST BOWEL MOVEMENT: 67086
APPETITE LEVEL: GOOD
LOWEST PAIN SEVERITY IN PAST 24 HOURS: 0/10
FATIGUES EASILY: 1
LOWER EXTREMITY EDEMA: 1
BOWEL PATTERN NORMAL: 1
DENIES PAIN: 1
SUBJECTIVE PAIN PROGRESSION: UNCHANGED
STOOL FREQUENCY: LESS THAN DAILY
CHANGE IN APPETITE: UNCHANGED
FATIGUE: 1

## 2024-09-03 ASSESSMENT — PAIN SCALES - PAIN ASSESSMENT IN ADVANCED DEMENTIA (PAINAD)
NEGVOCALIZATION: 0 - NONE.
BODYLANGUAGE: 0 - RELAXED.
BODYLANGUAGE: 0
CONSOLABILITY: 0
NEGVOCALIZATION: 0
FACIALEXPRESSION: 0
FACIALEXPRESSION: 0 - SMILING OR INEXPRESSIVE.
BREATHING: 0
TOTALSCORE: 0
CONSOLABILITY: 0 - NO NEED TO CONSOLE.

## 2024-09-03 ASSESSMENT — ACTIVITIES OF DAILY LIVING (ADL): MONEY MANAGEMENT (EXPENSES/BILLS): INDEPENDENT

## 2024-09-03 NOTE — HOME HEALTH
Patient seen today for routine suprapubic catheter change. #16F silicone Deal catheter inserted using sterile technique, balloon inflated with 10cc SNS, without complication. Patient tolerated procedure well with no complaints of pain or discomfort. Clear pale, yellow colored urine observed to be flowing freely to catheter tubing to drainage bag. All VS WNL. No changes in condition or medication’s were reported.

## 2024-09-04 DIAGNOSIS — N18.32 TYPE 2 DIABETES MELLITUS WITH STAGE 3B CHRONIC KIDNEY DISEASE, WITH LONG-TERM CURRENT USE OF INSULIN (MULTI): ICD-10-CM

## 2024-09-04 DIAGNOSIS — Z79.4 TYPE 2 DIABETES MELLITUS WITH STAGE 3B CHRONIC KIDNEY DISEASE, WITH LONG-TERM CURRENT USE OF INSULIN (MULTI): ICD-10-CM

## 2024-09-04 DIAGNOSIS — E11.22 TYPE 2 DIABETES MELLITUS WITH STAGE 3B CHRONIC KIDNEY DISEASE, WITH LONG-TERM CURRENT USE OF INSULIN (MULTI): ICD-10-CM

## 2024-09-04 RX ORDER — INSULIN GLARGINE 100 [IU]/ML
24 INJECTION, SOLUTION SUBCUTANEOUS EVERY MORNING
Qty: 15 ML | Refills: 1 | Status: SHIPPED | OUTPATIENT
Start: 2024-09-04

## 2024-09-05 ENCOUNTER — TELEPHONE (OUTPATIENT)
Dept: PRIMARY CARE | Facility: CLINIC | Age: 84
End: 2024-09-05
Payer: MEDICARE

## 2024-09-26 ENCOUNTER — HOME CARE VISIT (OUTPATIENT)
Dept: HOME HEALTH SERVICES | Facility: HOME HEALTH | Age: 84
End: 2024-09-26
Payer: MEDICARE

## 2024-09-26 VITALS
RESPIRATION RATE: 18 BRPM | DIASTOLIC BLOOD PRESSURE: 78 MMHG | TEMPERATURE: 97.7 F | HEART RATE: 68 BPM | OXYGEN SATURATION: 97 % | SYSTOLIC BLOOD PRESSURE: 130 MMHG

## 2024-09-26 PROCEDURE — G0300 HHS/HOSPICE OF LPN EA 15 MIN: HCPCS

## 2024-09-26 ASSESSMENT — PAIN SCALES - PAIN ASSESSMENT IN ADVANCED DEMENTIA (PAINAD)
NEGVOCALIZATION: 0
FACIALEXPRESSION: 0 - SMILING OR INEXPRESSIVE.
CONSOLABILITY: 0
BODYLANGUAGE: 0
TOTALSCORE: 0
BODYLANGUAGE: 0 - RELAXED.
NEGVOCALIZATION: 0 - NONE.
BREATHING: 0
FACIALEXPRESSION: 0
CONSOLABILITY: 0 - NO NEED TO CONSOLE.

## 2024-09-26 ASSESSMENT — ENCOUNTER SYMPTOMS
BOWEL PATTERN NORMAL: 1
FATIGUES EASILY: 1
HIGHEST PAIN SEVERITY IN PAST 24 HOURS: 3/10
DYSPNEA ACTIVITY LEVEL: AFTER AMBULATING MORE THAN 20 FT
STOOL FREQUENCY: LESS THAN DAILY
CHANGE IN APPETITE: UNCHANGED
APPETITE LEVEL: GOOD
DENIES PAIN: 1
SUBJECTIVE PAIN PROGRESSION: UNCHANGED
SHORTNESS OF BREATH: 1
LOWER EXTREMITY EDEMA: 1
PAIN SEVERITY GOAL: 0/10
LAST BOWEL MOVEMENT: 67109
MUSCLE WEAKNESS: 1
DYSPNEA ON EXERTION: 1

## 2024-09-26 ASSESSMENT — ACTIVITIES OF DAILY LIVING (ADL): MONEY MANAGEMENT (EXPENSES/BILLS): NEEDS ASSISTANCE

## 2024-10-03 ENCOUNTER — HOME CARE VISIT (OUTPATIENT)
Dept: HOME HEALTH SERVICES | Facility: HOME HEALTH | Age: 84
End: 2024-10-03
Payer: MEDICARE

## 2024-10-03 VITALS
DIASTOLIC BLOOD PRESSURE: 78 MMHG | SYSTOLIC BLOOD PRESSURE: 132 MMHG | TEMPERATURE: 97.9 F | HEART RATE: 68 BPM | OXYGEN SATURATION: 96 % | RESPIRATION RATE: 18 BRPM

## 2024-10-03 PROCEDURE — G0300 HHS/HOSPICE OF LPN EA 15 MIN: HCPCS

## 2024-10-03 ASSESSMENT — ENCOUNTER SYMPTOMS
SUBJECTIVE PAIN PROGRESSION: UNCHANGED
LOWEST PAIN SEVERITY IN PAST 24 HOURS: 0/10
APPETITE LEVEL: GOOD
LAST BOWEL MOVEMENT: 67116
CHANGE IN APPETITE: UNCHANGED
FATIGUE: 1
HIGHEST PAIN SEVERITY IN PAST 24 HOURS: 2/10
BOWEL PATTERN NORMAL: 1
DENIES PAIN: 1
MUSCLE WEAKNESS: 1
PAIN SEVERITY GOAL: 0/10
PERSON REPORTING PAIN: PATIENT
FATIGUES EASILY: 1

## 2024-10-03 ASSESSMENT — ACTIVITIES OF DAILY LIVING (ADL): MONEY MANAGEMENT (EXPENSES/BILLS): INDEPENDENT

## 2024-10-03 NOTE — HOME HEALTH
Patient seen today for routine suprapubic catheter change. #16F silicone Deal catheter inserted using aseptic technique, balloon inflated with 10ml SNS, without complication. Patient tolerated procedure well with no complaints of pain or discomfort. Catheter insertion site is observed to be clean with no signs or symptoms of infection noted. Patient continues to follow a diabetic diet and keeps a record of blood glucose levels daily. Ambulates throughout the home with the aid of a wheeled walker. Appetite is good and he denies any issues moving bowels. All VS WNL.

## 2024-10-12 ENCOUNTER — HOME CARE VISIT (OUTPATIENT)
Dept: HOME HEALTH SERVICES | Facility: HOME HEALTH | Age: 84
End: 2024-10-12
Payer: MEDICARE

## 2024-10-13 DIAGNOSIS — I50.22 CHRONIC SYSTOLIC HEART FAILURE: ICD-10-CM

## 2024-10-14 ENCOUNTER — HOME CARE VISIT (OUTPATIENT)
Dept: HOME HEALTH SERVICES | Facility: HOME HEALTH | Age: 84
End: 2024-10-14
Payer: MEDICARE

## 2024-10-14 VITALS
TEMPERATURE: 98.8 F | OXYGEN SATURATION: 96 % | RESPIRATION RATE: 18 BRPM | DIASTOLIC BLOOD PRESSURE: 70 MMHG | HEART RATE: 61 BPM | SYSTOLIC BLOOD PRESSURE: 110 MMHG

## 2024-10-14 PROCEDURE — G0299 HHS/HOSPICE OF RN EA 15 MIN: HCPCS

## 2024-10-14 RX ORDER — TORSEMIDE 20 MG/1
60 TABLET ORAL DAILY
Qty: 270 TABLET | Refills: 1 | Status: SHIPPED | OUTPATIENT
Start: 2024-10-14 | End: 2025-04-12

## 2024-10-14 ASSESSMENT — ENCOUNTER SYMPTOMS
HIGHEST PAIN SEVERITY IN PAST 24 HOURS: 0/10
APPETITE LEVEL: GOOD
LOWEST PAIN SEVERITY IN PAST 24 HOURS: 0/10
MUSCLE WEAKNESS: 1
LOWER EXTREMITY EDEMA: 1
PAIN SEVERITY GOAL: 0/10

## 2024-10-14 ASSESSMENT — PAIN SCALES - PAIN ASSESSMENT IN ADVANCED DEMENTIA (PAINAD): BREATHING: 0

## 2024-10-14 ASSESSMENT — ACTIVITIES OF DAILY LIVING (ADL)
ENTERING_EXITING_HOME: STAND BY ASSIST
OASIS_M1830: 02

## 2024-10-25 ENCOUNTER — APPOINTMENT (OUTPATIENT)
Dept: PRIMARY CARE | Facility: CLINIC | Age: 84
End: 2024-10-25
Payer: MEDICARE

## 2024-10-25 ENCOUNTER — LAB (OUTPATIENT)
Dept: LAB | Facility: LAB | Age: 84
End: 2024-10-25
Payer: MEDICARE

## 2024-10-25 VITALS
HEART RATE: 72 BPM | DIASTOLIC BLOOD PRESSURE: 78 MMHG | HEIGHT: 70 IN | WEIGHT: 230 LBS | OXYGEN SATURATION: 92 % | SYSTOLIC BLOOD PRESSURE: 141 MMHG | BODY MASS INDEX: 32.93 KG/M2

## 2024-10-25 DIAGNOSIS — Z79.4 TYPE 2 DIABETES MELLITUS WITH STAGE 3B CHRONIC KIDNEY DISEASE, WITH LONG-TERM CURRENT USE OF INSULIN (MULTI): Primary | ICD-10-CM

## 2024-10-25 DIAGNOSIS — E11.59 HYPERTENSION ASSOCIATED WITH DIABETES (MULTI): ICD-10-CM

## 2024-10-25 DIAGNOSIS — I50.22 CHRONIC SYSTOLIC HEART FAILURE: ICD-10-CM

## 2024-10-25 DIAGNOSIS — N18.32 TYPE 2 DIABETES MELLITUS WITH STAGE 3B CHRONIC KIDNEY DISEASE, WITH LONG-TERM CURRENT USE OF INSULIN (MULTI): Primary | ICD-10-CM

## 2024-10-25 DIAGNOSIS — I15.2 HYPERTENSION ASSOCIATED WITH DIABETES (MULTI): ICD-10-CM

## 2024-10-25 DIAGNOSIS — E66.01 SEVERE OBESITY (BMI 35.0-39.9) WITH COMORBIDITY (MULTI): ICD-10-CM

## 2024-10-25 DIAGNOSIS — E11.22 TYPE 2 DIABETES MELLITUS WITH STAGE 3B CHRONIC KIDNEY DISEASE, WITH LONG-TERM CURRENT USE OF INSULIN (MULTI): Primary | ICD-10-CM

## 2024-10-25 DIAGNOSIS — N18.32 TYPE 2 DIABETES MELLITUS WITH STAGE 3B CHRONIC KIDNEY DISEASE, WITH LONG-TERM CURRENT USE OF INSULIN (MULTI): ICD-10-CM

## 2024-10-25 DIAGNOSIS — E11.22 TYPE 2 DIABETES MELLITUS WITH STAGE 3B CHRONIC KIDNEY DISEASE, WITH LONG-TERM CURRENT USE OF INSULIN (MULTI): ICD-10-CM

## 2024-10-25 DIAGNOSIS — Z79.4 TYPE 2 DIABETES MELLITUS WITH STAGE 3B CHRONIC KIDNEY DISEASE, WITH LONG-TERM CURRENT USE OF INSULIN (MULTI): ICD-10-CM

## 2024-10-25 PROCEDURE — 83036 HEMOGLOBIN GLYCOSYLATED A1C: CPT

## 2024-10-25 PROCEDURE — G2211 COMPLEX E/M VISIT ADD ON: HCPCS | Performed by: INTERNAL MEDICINE

## 2024-10-25 PROCEDURE — 1157F ADVNC CARE PLAN IN RCRD: CPT | Performed by: INTERNAL MEDICINE

## 2024-10-25 PROCEDURE — 80053 COMPREHEN METABOLIC PANEL: CPT

## 2024-10-25 PROCEDURE — 99214 OFFICE O/P EST MOD 30 MIN: CPT | Performed by: INTERNAL MEDICINE

## 2024-10-25 PROCEDURE — 1123F ACP DISCUSS/DSCN MKR DOCD: CPT | Performed by: INTERNAL MEDICINE

## 2024-10-25 PROCEDURE — 1159F MED LIST DOCD IN RCRD: CPT | Performed by: INTERNAL MEDICINE

## 2024-10-25 PROCEDURE — 85027 COMPLETE CBC AUTOMATED: CPT

## 2024-10-25 PROCEDURE — 3077F SYST BP >= 140 MM HG: CPT | Performed by: INTERNAL MEDICINE

## 2024-10-25 PROCEDURE — 36415 COLL VENOUS BLD VENIPUNCTURE: CPT

## 2024-10-25 PROCEDURE — 3078F DIAST BP <80 MM HG: CPT | Performed by: INTERNAL MEDICINE

## 2024-10-25 PROCEDURE — 1036F TOBACCO NON-USER: CPT | Performed by: INTERNAL MEDICINE

## 2024-10-25 ASSESSMENT — PATIENT HEALTH QUESTIONNAIRE - PHQ9
2. FEELING DOWN, DEPRESSED OR HOPELESS: NOT AT ALL
1. LITTLE INTEREST OR PLEASURE IN DOING THINGS: NOT AT ALL
SUM OF ALL RESPONSES TO PHQ9 QUESTIONS 1 AND 2: 0

## 2024-10-25 NOTE — PROGRESS NOTES
"Subjective   Patient ID: Dennis Galo is a 84 y.o. male who presents for Follow-up.    HPI   Here with brother    No falls, using walker.    Weight up 10 pounds    Urinary stable. Hasn't seen Urologist. Home Care changes bag once per month.    Leg swelling the same. Taking 60mg torsemide daily.      Review of Systems    Objective   /78   Pulse 72   Ht 1.778 m (5' 10\")   Wt 104 kg (230 lb)   SpO2 92%   BMI 33.00 kg/m²     Physical Exam  Cardiovascular:      Rate and Rhythm: Normal rate and regular rhythm.      Heart sounds: No murmur heard.  Pulmonary:      Effort: Pulmonary effort is normal.      Breath sounds: Normal breath sounds.   Musculoskeletal:      Right lower leg: Edema (2+) present.      Left lower leg: Edema (2+) present.   Skin:     Comments: Noninfectious blisters bilateral anterior shin and chronic venous stasis changes  Neurological:      Mental Status: He is alert.     Assessment/Plan   Problem List Items Addressed This Visit             ICD-10-CM    Hypertension associated with diabetes (Multi) E11.59, I15.2    Severe obesity (BMI 35.0-39.9) with comorbidity (Multi) E66.01    Systolic heart failure I50.20    Type 2 diabetes mellitus with chronic kidney disease, with long-term current use of insulin (Multi) - Primary E11.22, Z79.4    Relevant Orders    CBC    Comprehensive Metabolic Panel    Hemoglobin A1C          Urinary stable. Pt says he will follow up with Urology if needed.     Prostate cancer, pt has declined treatment thus far.  Pt says he will follow up with Urology if needed.     DM. Last A1C improved from prior. Continue lantus at 24 units daily, continue GLP-1, continue januvia.  Check A1C.     CHF, CKD - edema stable. Continue torsemide 60mg/day.      Check labs  Follow up 4 months  "

## 2024-10-26 LAB
ALBUMIN SERPL BCP-MCNC: 3.8 G/DL (ref 3.4–5)
ALP SERPL-CCNC: 151 U/L (ref 33–136)
ALT SERPL W P-5'-P-CCNC: 22 U/L (ref 10–52)
ANION GAP SERPL CALC-SCNC: 11 MMOL/L (ref 10–20)
AST SERPL W P-5'-P-CCNC: 20 U/L (ref 9–39)
BILIRUB SERPL-MCNC: 1.2 MG/DL (ref 0–1.2)
BUN SERPL-MCNC: 26 MG/DL (ref 6–23)
CALCIUM SERPL-MCNC: 8.8 MG/DL (ref 8.6–10.6)
CHLORIDE SERPL-SCNC: 99 MMOL/L (ref 98–107)
CO2 SERPL-SCNC: 33 MMOL/L (ref 21–32)
CREAT SERPL-MCNC: 1.64 MG/DL (ref 0.5–1.3)
EGFRCR SERPLBLD CKD-EPI 2021: 42 ML/MIN/1.73M*2
ERYTHROCYTE [DISTWIDTH] IN BLOOD BY AUTOMATED COUNT: 14.3 % (ref 11.5–14.5)
EST. AVERAGE GLUCOSE BLD GHB EST-MCNC: 217 MG/DL
GLUCOSE SERPL-MCNC: 130 MG/DL (ref 74–99)
HBA1C MFR BLD: 9.2 %
HCT VFR BLD AUTO: 42.7 % (ref 41–52)
HGB BLD-MCNC: 13.4 G/DL (ref 13.5–17.5)
MCH RBC QN AUTO: 28.3 PG (ref 26–34)
MCHC RBC AUTO-ENTMCNC: 31.4 G/DL (ref 32–36)
MCV RBC AUTO: 90 FL (ref 80–100)
NRBC BLD-RTO: 0 /100 WBCS (ref 0–0)
PLATELET # BLD AUTO: 232 X10*3/UL (ref 150–450)
POTASSIUM SERPL-SCNC: 4.1 MMOL/L (ref 3.5–5.3)
PROT SERPL-MCNC: 6.7 G/DL (ref 6.4–8.2)
RBC # BLD AUTO: 4.73 X10*6/UL (ref 4.5–5.9)
SODIUM SERPL-SCNC: 139 MMOL/L (ref 136–145)
WBC # BLD AUTO: 5.7 X10*3/UL (ref 4.4–11.3)

## 2024-10-31 ENCOUNTER — HOME CARE VISIT (OUTPATIENT)
Dept: HOME HEALTH SERVICES | Facility: HOME HEALTH | Age: 84
End: 2024-10-31
Payer: MEDICARE

## 2024-11-06 ENCOUNTER — TELEPHONE (OUTPATIENT)
Dept: PRIMARY CARE | Facility: CLINIC | Age: 84
End: 2024-11-06
Payer: MEDICARE

## 2024-11-07 ENCOUNTER — HOME CARE VISIT (OUTPATIENT)
Dept: HOME HEALTH SERVICES | Facility: HOME HEALTH | Age: 84
End: 2024-11-07
Payer: MEDICARE

## 2024-11-07 VITALS
TEMPERATURE: 98.4 F | DIASTOLIC BLOOD PRESSURE: 72 MMHG | HEART RATE: 66 BPM | OXYGEN SATURATION: 98 % | SYSTOLIC BLOOD PRESSURE: 124 MMHG | RESPIRATION RATE: 18 BRPM

## 2024-11-07 PROCEDURE — G0300 HHS/HOSPICE OF LPN EA 15 MIN: HCPCS

## 2024-11-07 ASSESSMENT — ENCOUNTER SYMPTOMS
FATIGUE: 1
APPETITE LEVEL: GOOD
HIGHEST PAIN SEVERITY IN PAST 24 HOURS: 3/10
DENIES PAIN: 1
BOWEL PATTERN NORMAL: 1
FATIGUES EASILY: 1
CHANGE IN APPETITE: UNCHANGED
LOWER EXTREMITY EDEMA: 1
PAIN SEVERITY GOAL: 0/10
DRY SKIN: 1
LAST BOWEL MOVEMENT: 67151
MUSCLE WEAKNESS: 1
SUBJECTIVE PAIN PROGRESSION: UNCHANGED
PERSON REPORTING PAIN: PATIENT
LOWEST PAIN SEVERITY IN PAST 24 HOURS: 0/10

## 2024-11-07 ASSESSMENT — ACTIVITIES OF DAILY LIVING (ADL): MONEY MANAGEMENT (EXPENSES/BILLS): INDEPENDENT

## 2024-11-07 ASSESSMENT — PAIN SCALES - PAIN ASSESSMENT IN ADVANCED DEMENTIA (PAINAD)
CONSOLABILITY: 0 - NO NEED TO CONSOLE.
TOTALSCORE: 0
FACIALEXPRESSION: 0 - SMILING OR INEXPRESSIVE.
BODYLANGUAGE: 0 - RELAXED.
NEGVOCALIZATION: 0
NEGVOCALIZATION: 0 - NONE.
BODYLANGUAGE: 0
FACIALEXPRESSION: 0
CONSOLABILITY: 0
BREATHING: 0

## 2024-11-07 NOTE — HOME HEALTH
Patient seen today for routine, suprapubic liu catheter change. Old catheter removed. Inserted suprapubic #16F silicone Liu catheter using sterile technique, balloon inflated with 10ml SNS, without complication. Patient tolerated procedure well with no complaints of pain or discomfort. Clear straw-colored urine observed to be flowing freely through catheter tubing to leg bag. All VS WNL. Long sounds CTA. patient continues compliance with diabetic and low sodium diet. Ambulates throughout home with assistance of wheeled walker. No changes and medication at this time.

## 2024-11-08 ENCOUNTER — TELEPHONE (OUTPATIENT)
Dept: PRIMARY CARE | Facility: CLINIC | Age: 84
End: 2024-11-08
Payer: MEDICARE

## 2024-11-08 NOTE — TELEPHONE ENCOUNTER
----- Message from Triston Samson sent at 11/3/2024  9:28 AM EST -----  A1C trending in right direction but still high. If he would watch the sugar intake in the diet this number should come down. Continue same meds and just cut out the sweets in the diet.

## 2024-11-11 ENCOUNTER — TELEPHONE (OUTPATIENT)
Dept: PRIMARY CARE | Facility: CLINIC | Age: 84
End: 2024-11-11
Payer: MEDICARE

## 2024-11-13 ENCOUNTER — TELEPHONE (OUTPATIENT)
Dept: PRIMARY CARE | Facility: CLINIC | Age: 84
End: 2024-11-13
Payer: MEDICARE

## 2024-11-13 ENCOUNTER — HOME CARE VISIT (OUTPATIENT)
Dept: HOME HEALTH SERVICES | Facility: HOME HEALTH | Age: 84
End: 2024-11-13
Payer: MEDICARE

## 2024-11-13 NOTE — TELEPHONE ENCOUNTER
"Spoke to patient to give results : per Dr Samson A1C trending in right direction but still high. If he would watch the sugar intake in the diet this number should come down. Continue same meds and just cut out the sweets in the diet.     Patient said \"I will try\"  "

## 2024-12-04 ENCOUNTER — HOME CARE VISIT (OUTPATIENT)
Dept: HOME HEALTH SERVICES | Facility: HOME HEALTH | Age: 84
End: 2024-12-04
Payer: MEDICARE

## 2024-12-04 VITALS
HEART RATE: 70 BPM | SYSTOLIC BLOOD PRESSURE: 128 MMHG | RESPIRATION RATE: 18 BRPM | TEMPERATURE: 97.9 F | OXYGEN SATURATION: 97 % | DIASTOLIC BLOOD PRESSURE: 78 MMHG

## 2024-12-04 PROCEDURE — G0300 HHS/HOSPICE OF LPN EA 15 MIN: HCPCS

## 2024-12-04 ASSESSMENT — ACTIVITIES OF DAILY LIVING (ADL): MONEY MANAGEMENT (EXPENSES/BILLS): INDEPENDENT

## 2024-12-04 ASSESSMENT — ENCOUNTER SYMPTOMS
SUBJECTIVE PAIN PROGRESSION: UNCHANGED
BOWEL PATTERN NORMAL: 1
PAIN SEVERITY GOAL: 0/10
DRY SKIN: 1
LAST BOWEL MOVEMENT: 67178
LOWER EXTREMITY EDEMA: 1
CHANGE IN APPETITE: UNCHANGED
APPETITE LEVEL: FAIR
HIGHEST PAIN SEVERITY IN PAST 24 HOURS: 3/10
LOWEST PAIN SEVERITY IN PAST 24 HOURS: 0/10
FATIGUE: 1
DENIES PAIN: 1
PERSON REPORTING PAIN: PATIENT
MUSCLE WEAKNESS: 1
FATIGUES EASILY: 1

## 2024-12-04 NOTE — HOME HEALTH
Patient seen today for routine suprapubic catheter change. Old catheter was removed without complication. #16F silicone Deal catheter inserted using sterile technique, balloon inflated with 10ml SNS, without complication. Patient tolerated procedure well with no complaints of pain or discomfort. B/L calf and ankle +1 edema. All VS WNL. Patient continues to conscientiously follow a diabetic diet. he ambulates throughout the home with the assistance of a wheeled walker. Appetite, good. Denies any issues moving bowels. No changes and medication to report.

## 2024-12-13 ENCOUNTER — HOME CARE VISIT (OUTPATIENT)
Dept: HOME HEALTH SERVICES | Facility: HOME HEALTH | Age: 84
End: 2024-12-13
Payer: MEDICARE

## 2024-12-13 VITALS
OXYGEN SATURATION: 98 % | HEART RATE: 60 BPM | TEMPERATURE: 98.3 F | RESPIRATION RATE: 18 BRPM | DIASTOLIC BLOOD PRESSURE: 80 MMHG | SYSTOLIC BLOOD PRESSURE: 122 MMHG

## 2024-12-13 PROCEDURE — G0299 HHS/HOSPICE OF RN EA 15 MIN: HCPCS

## 2024-12-13 ASSESSMENT — ACTIVITIES OF DAILY LIVING (ADL)
OASIS_M1830: 03
ENTERING_EXITING_HOME: STAND BY ASSIST

## 2024-12-13 ASSESSMENT — PAIN SCALES - PAIN ASSESSMENT IN ADVANCED DEMENTIA (PAINAD): BREATHING: 0

## 2024-12-13 ASSESSMENT — ENCOUNTER SYMPTOMS
LOWER EXTREMITY EDEMA: 1
APPETITE LEVEL: FAIR
PAIN SEVERITY GOAL: 0/10
HIGHEST PAIN SEVERITY IN PAST 24 HOURS: 0/10
MUSCLE WEAKNESS: 1
LOWEST PAIN SEVERITY IN PAST 24 HOURS: 0/10

## 2024-12-14 ENCOUNTER — HOME CARE VISIT (OUTPATIENT)
Dept: HOME HEALTH SERVICES | Facility: HOME HEALTH | Age: 84
End: 2024-12-14
Payer: MEDICARE

## 2024-12-18 ENCOUNTER — HOME CARE VISIT (OUTPATIENT)
Dept: HOME HEALTH SERVICES | Facility: HOME HEALTH | Age: 84
End: 2024-12-18
Payer: MEDICARE

## 2024-12-18 VITALS
RESPIRATION RATE: 18 BRPM | DIASTOLIC BLOOD PRESSURE: 80 MMHG | HEART RATE: 68 BPM | TEMPERATURE: 97.9 F | SYSTOLIC BLOOD PRESSURE: 126 MMHG | OXYGEN SATURATION: 96 %

## 2024-12-18 PROCEDURE — G0300 HHS/HOSPICE OF LPN EA 15 MIN: HCPCS

## 2024-12-18 ASSESSMENT — PAIN SCALES - PAIN ASSESSMENT IN ADVANCED DEMENTIA (PAINAD)
CONSOLABILITY: 0
NEGVOCALIZATION: 0
TOTALSCORE: 0
FACIALEXPRESSION: 0 - SMILING OR INEXPRESSIVE.
BODYLANGUAGE: 0
BREATHING: 0
BODYLANGUAGE: 0 - RELAXED.
NEGVOCALIZATION: 0 - NONE.
CONSOLABILITY: 0 - NO NEED TO CONSOLE.
FACIALEXPRESSION: 0

## 2024-12-18 ASSESSMENT — ENCOUNTER SYMPTOMS
CHANGE IN APPETITE: UNCHANGED
APPETITE LEVEL: GOOD
LAST BOWEL MOVEMENT: 67192
FATIGUES EASILY: 1
LOWER EXTREMITY EDEMA: 1
FATIGUE: 1
MUSCLE WEAKNESS: 1
HIGHEST PAIN SEVERITY IN PAST 24 HOURS: 3/10
DRY SKIN: 1
BOWEL PATTERN NORMAL: 1
PERSON REPORTING PAIN: PATIENT
PAIN SEVERITY GOAL: 0/10
DENIES PAIN: 1
SUBJECTIVE PAIN PROGRESSION: UNCHANGED
FORGETFULNESS: 1
LOWEST PAIN SEVERITY IN PAST 24 HOURS: 0/10

## 2024-12-18 ASSESSMENT — ACTIVITIES OF DAILY LIVING (ADL): MONEY MANAGEMENT (EXPENSES/BILLS): INDEPENDENT

## 2024-12-18 NOTE — HOME HEALTH
Patient seen today for routine SN visit. Patient verbalized anxiety due to spouse being hospitalized for the second time in the last month. He stated he’s awaiting a phone call regarding possible discharge. Spouse assists patient with medication administration and reminders throughout the day as needed. All VS WNL. Lung sounds CTA. B/L LE +1/+2 edema. Suprapubic catheter observed to be intact and patent with clear monique-colored urine flowing freely through cat tubing to leg bag. Reviewed prior education with patient on importance of drinking adequate fluids throughout the day. Patient verbalized understanding. No changes in medication at this time. Next routine catheter change is scheduled for first week of January.

## 2025-01-07 ENCOUNTER — HOME CARE VISIT (OUTPATIENT)
Dept: HOME HEALTH SERVICES | Facility: HOME HEALTH | Age: 85
End: 2025-01-07
Payer: MEDICARE

## 2025-01-07 VITALS
RESPIRATION RATE: 18 BRPM | OXYGEN SATURATION: 97 % | HEART RATE: 68 BPM | SYSTOLIC BLOOD PRESSURE: 128 MMHG | DIASTOLIC BLOOD PRESSURE: 78 MMHG | TEMPERATURE: 97.7 F

## 2025-01-07 PROCEDURE — G0300 HHS/HOSPICE OF LPN EA 15 MIN: HCPCS

## 2025-01-07 ASSESSMENT — ENCOUNTER SYMPTOMS
SUBJECTIVE PAIN PROGRESSION: UNCHANGED
LAST BOWEL MOVEMENT: 67212
MUSCLE WEAKNESS: 1
BOWEL PATTERN NORMAL: 1
APPETITE LEVEL: GOOD
PAIN SEVERITY GOAL: 0/10
PAIN LOCATION: RIGHT KNEE
HIGHEST PAIN SEVERITY IN PAST 24 HOURS: 5/10
PAIN LOCATION - PAIN SEVERITY: 4/10
FATIGUES EASILY: 1
CHANGE IN APPETITE: UNCHANGED
PERSON REPORTING PAIN: PATIENT
PAIN LOCATION - PAIN FREQUENCY: WITH ACTIVITY
PAIN LOCATION - PAIN FREQUENCY: WITH ACTIVITY
PAIN LOCATION - PAIN SEVERITY: 4/10
PAIN LOCATION - PAIN QUALITY: ACHE, SORENESS
PAIN LOCATION: LEFT KNEE
PAIN LOCATION - PAIN QUALITY: ACHE, SORENESS
FORGETFULNESS: 1
LOWEST PAIN SEVERITY IN PAST 24 HOURS: 0/10
PAIN: 1
FATIGUE: 1
LOWER EXTREMITY EDEMA: 1

## 2025-01-07 ASSESSMENT — ACTIVITIES OF DAILY LIVING (ADL): MONEY MANAGEMENT (EXPENSES/BILLS): INDEPENDENT

## 2025-01-07 NOTE — HOME HEALTH
Patient seen today for routine suprapubic catheter change. Old catheter removed without complication. #16F silicone Deal catheter, inserted utilizing sterile technique, balloon inflated with 10ml SNS, without complication. Patient tolerated procedure well with no complaints of pain or discomfort. Clear, pale yellow-colored urine observed to be flowing freely through catheter tubing to leg bag. Plus one edema observed to B/L LE. All VS WNL. Lung sounds CTA. Appetite remains good. Patient denies any issues moving bowels. Patient is compliant with diabetic diet. . No changes in medications at this time.

## 2025-01-11 DIAGNOSIS — N18.32 TYPE 2 DIABETES MELLITUS WITH STAGE 3B CHRONIC KIDNEY DISEASE, WITH LONG-TERM CURRENT USE OF INSULIN (MULTI): ICD-10-CM

## 2025-01-11 DIAGNOSIS — E11.22 TYPE 2 DIABETES MELLITUS WITH STAGE 3B CHRONIC KIDNEY DISEASE, WITH LONG-TERM CURRENT USE OF INSULIN (MULTI): ICD-10-CM

## 2025-01-11 DIAGNOSIS — Z79.4 TYPE 2 DIABETES MELLITUS WITH STAGE 3B CHRONIC KIDNEY DISEASE, WITH LONG-TERM CURRENT USE OF INSULIN (MULTI): ICD-10-CM

## 2025-01-13 RX ORDER — INSULIN GLARGINE 100 [IU]/ML
24 INJECTION, SOLUTION SUBCUTANEOUS EVERY MORNING
Qty: 15 ML | Refills: 1 | Status: SHIPPED | OUTPATIENT
Start: 2025-01-13

## 2025-01-15 ENCOUNTER — HOME CARE VISIT (OUTPATIENT)
Dept: HOME HEALTH SERVICES | Facility: HOME HEALTH | Age: 85
End: 2025-01-15
Payer: MEDICARE

## 2025-01-15 VITALS
HEART RATE: 72 BPM | SYSTOLIC BLOOD PRESSURE: 124 MMHG | DIASTOLIC BLOOD PRESSURE: 78 MMHG | RESPIRATION RATE: 18 BRPM | OXYGEN SATURATION: 97 % | TEMPERATURE: 97.5 F

## 2025-01-15 PROCEDURE — G0300 HHS/HOSPICE OF LPN EA 15 MIN: HCPCS

## 2025-01-15 ASSESSMENT — ENCOUNTER SYMPTOMS
PAIN LOCATION - PAIN FREQUENCY: WITH ACTIVITY
DRY SKIN: 1
PAIN LOCATION - PAIN SEVERITY: 3/10
PAIN LOCATION - PAIN QUALITY: ACHE, SORENESS
FATIGUES EASILY: 1
FORGETFULNESS: 1
LAST BOWEL MOVEMENT: 67220
MUSCLE WEAKNESS: 1
PERSON REPORTING PAIN: PATIENT
PAIN LOCATION - PAIN QUALITY: ACHE, SORENESS
PAIN LOCATION: LEFT KNEE
BOWEL PATTERN NORMAL: 1
PAIN LOCATION - PAIN FREQUENCY: WITH ACTIVITY
APPETITE LEVEL: GOOD
PAIN LOCATION - PAIN SEVERITY: 3/10
DESCRIPTION OF MEMORY LOSS: SHORT TERM
HIGHEST PAIN SEVERITY IN PAST 24 HOURS: 4/10
LOWER EXTREMITY EDEMA: 1
PAIN LOCATION: RIGHT KNEE
PAIN: 1
CHANGE IN APPETITE: UNCHANGED
PAIN SEVERITY GOAL: 0/10
SUBJECTIVE PAIN PROGRESSION: UNCHANGED
LOWEST PAIN SEVERITY IN PAST 24 HOURS: 0/10

## 2025-01-15 ASSESSMENT — ACTIVITIES OF DAILY LIVING (ADL): MONEY MANAGEMENT (EXPENSES/BILLS): INDEPENDENT

## 2025-01-15 NOTE — HOME HEALTH
Patient seen today for routine SN visit in monitoring of super pubic catheter and edema. Patient spouse has returned home from hospitalization and he verbalizes being overwhelmed at this time having to provide care for her on his own. Patient c/o chronic pain to bilateral knees, exacerbated with activity. +1 non-pitting edema to B/L LE. All VS WNL. . Suprapubic catheter is observed to be clean and intact with clear   yellow–colored urine flowing freely to leg bag. Appetite remains good and patient conscientiously follows a diabetic diet.

## 2025-02-05 ENCOUNTER — HOME CARE VISIT (OUTPATIENT)
Dept: HOME HEALTH SERVICES | Facility: HOME HEALTH | Age: 85
End: 2025-02-05
Payer: MEDICARE

## 2025-02-05 VITALS
HEART RATE: 72 BPM | OXYGEN SATURATION: 96 % | DIASTOLIC BLOOD PRESSURE: 72 MMHG | SYSTOLIC BLOOD PRESSURE: 124 MMHG | TEMPERATURE: 97.7 F | RESPIRATION RATE: 18 BRPM

## 2025-02-05 DIAGNOSIS — B37.9 CANDIDIASIS: Primary | ICD-10-CM

## 2025-02-05 PROCEDURE — G0300 HHS/HOSPICE OF LPN EA 15 MIN: HCPCS

## 2025-02-05 RX ORDER — NYSTATIN 100000 [USP'U]/G
1 POWDER TOPICAL 2 TIMES DAILY
Qty: 60 G | Refills: 0 | Status: SHIPPED | OUTPATIENT
Start: 2025-02-05 | End: 2026-02-05

## 2025-02-05 NOTE — HOME HEALTH
Patient seen today for routine suprapubic catheter change. Catheter removed. #16F silicone Deal catheter inserted, balloon inflated with 10ml of SNS, utilizing sterile technique. Patient tolerated procedure well. Clear yellow–colored urine observed to be flowing freely through catheter tubing to leg bag. Patient continues to follow diabetic diet, today’s . Patient keeps a daily log of blood glucose readings. Patient has a good amount of supplies in the home at this time. All VS WNL. Lung sounds CTA. No changes in medication.

## 2025-02-08 ASSESSMENT — ENCOUNTER SYMPTOMS
LAST BOWEL MOVEMENT: 67241
HIGHEST PAIN SEVERITY IN PAST 24 HOURS: 4/10
FATIGUES EASILY: 1
PAIN LOCATION - PAIN FREQUENCY: WITH ACTIVITY
MUSCLE WEAKNESS: 1
DRY SKIN: 1
PAIN LOCATION: RIGHT KNEE
APPETITE LEVEL: GOOD
PAIN LOCATION - PAIN QUALITY: ACHE, STIFFNESS
PERSON REPORTING PAIN: PATIENT
BOWEL PATTERN NORMAL: 1
PAIN LOCATION - PAIN SEVERITY: 3/10
LOWER EXTREMITY EDEMA: 1
SUBJECTIVE PAIN PROGRESSION: UNCHANGED
CHANGE IN APPETITE: UNCHANGED
PAIN LOCATION - PAIN QUALITY: ACHE, STIFFNESS
PAIN LOCATION - PAIN SEVERITY: 3/10
PAIN LOCATION - PAIN FREQUENCY: WITH ACTIVITY
PAIN LOCATION: LEFT KNEE
PAIN: 1
LOWEST PAIN SEVERITY IN PAST 24 HOURS: 0/10
FATIGUE: 1
PAIN SEVERITY GOAL: 0/10

## 2025-02-08 ASSESSMENT — ACTIVITIES OF DAILY LIVING (ADL): MONEY MANAGEMENT (EXPENSES/BILLS): INDEPENDENT

## 2025-02-10 ENCOUNTER — HOME CARE VISIT (OUTPATIENT)
Dept: HOME HEALTH SERVICES | Facility: HOME HEALTH | Age: 85
End: 2025-02-10
Payer: MEDICARE

## 2025-02-10 VITALS
DIASTOLIC BLOOD PRESSURE: 80 MMHG | RESPIRATION RATE: 18 BRPM | OXYGEN SATURATION: 98 % | TEMPERATURE: 98.8 F | SYSTOLIC BLOOD PRESSURE: 136 MMHG | HEART RATE: 75 BPM

## 2025-02-10 PROCEDURE — G0299 HHS/HOSPICE OF RN EA 15 MIN: HCPCS

## 2025-02-10 ASSESSMENT — ACTIVITIES OF DAILY LIVING (ADL)
ENTERING_EXITING_HOME: STAND BY ASSIST
OASIS_M1830: 03

## 2025-02-10 ASSESSMENT — ENCOUNTER SYMPTOMS
LOWER EXTREMITY EDEMA: 1
APPETITE LEVEL: FAIR
LOWEST PAIN SEVERITY IN PAST 24 HOURS: 0/10
HIGHEST PAIN SEVERITY IN PAST 24 HOURS: 4/10
MUSCLE WEAKNESS: 1
PAIN SEVERITY GOAL: 0/10

## 2025-02-10 ASSESSMENT — PAIN SCALES - PAIN ASSESSMENT IN ADVANCED DEMENTIA (PAINAD): BREATHING: 0

## 2025-02-20 ENCOUNTER — APPOINTMENT (OUTPATIENT)
Dept: PRIMARY CARE | Facility: CLINIC | Age: 85
End: 2025-02-20
Payer: MEDICARE

## 2025-03-08 ENCOUNTER — HOME CARE VISIT (OUTPATIENT)
Dept: HOME HEALTH SERVICES | Facility: HOME HEALTH | Age: 85
End: 2025-03-08
Payer: MEDICARE

## 2025-03-08 VITALS
DIASTOLIC BLOOD PRESSURE: 76 MMHG | RESPIRATION RATE: 18 BRPM | TEMPERATURE: 99 F | SYSTOLIC BLOOD PRESSURE: 128 MMHG | OXYGEN SATURATION: 97 % | HEART RATE: 74 BPM

## 2025-03-08 PROCEDURE — G0300 HHS/HOSPICE OF LPN EA 15 MIN: HCPCS

## 2025-03-08 ASSESSMENT — ENCOUNTER SYMPTOMS
FATIGUES EASILY: 1
BOWEL PATTERN NORMAL: 1
HIGHEST PAIN SEVERITY IN PAST 24 HOURS: 5/10
PAIN LOCATION - EXACERBATING FACTORS: AMBULATING
PAIN LOCATION: RIGHT KNEE
PAIN LOCATION - EXACERBATING FACTORS: AMBULATING
CHANGE IN APPETITE: UNCHANGED
APPETITE LEVEL: GOOD
PAIN: 1
PAIN LOCATION: LEFT KNEE
PAIN SEVERITY GOAL: 0/10
PAIN LOCATION - PAIN SEVERITY: 3/10
PERSON REPORTING PAIN: PATIENT
PAIN LOCATION - PAIN FREQUENCY: WITH ACTIVITY
PAIN LOCATION - PAIN SEVERITY: 3/10
PAIN LOCATION - PAIN FREQUENCY: WITH ACTIVITY
LOWER EXTREMITY EDEMA: 1
LOWEST PAIN SEVERITY IN PAST 24 HOURS: 0/10
SUBJECTIVE PAIN PROGRESSION: UNCHANGED
DRY SKIN: 1
LAST BOWEL MOVEMENT: 67272
MUSCLE WEAKNESS: 1

## 2025-03-08 ASSESSMENT — ACTIVITIES OF DAILY LIVING (ADL): MONEY MANAGEMENT (EXPENSES/BILLS): INDEPENDENT

## 2025-03-08 NOTE — HOME HEALTH
Patient seen today for routine suprapubic catheter change. All VS WNL. FBS 94. Weight remains stable with minimal fluctuation. Old catheter removed. #16F silicone Deal catheter, inserted utilizing sterile technique, balloon inflated with 10ML sterile saline, without complication. Patient tolerated procedure well with no complaints of pain or discomfort. Clear yellow color urine observed to be flowing freely through catheter tubing to leg bag. All VS WNL. FBS 94. Patient continues to follow a diabetic diet, appetite remains good. No changes in medication at this time.

## 2025-04-06 DIAGNOSIS — I50.22 CHRONIC SYSTOLIC HEART FAILURE: ICD-10-CM

## 2025-04-07 RX ORDER — METOPROLOL TARTRATE 25 MG/1
25 TABLET, FILM COATED ORAL 2 TIMES DAILY
Qty: 180 TABLET | Refills: 1 | Status: SHIPPED | OUTPATIENT
Start: 2025-04-07

## 2025-04-08 ENCOUNTER — HOME CARE VISIT (OUTPATIENT)
Dept: HOME HEALTH SERVICES | Facility: HOME HEALTH | Age: 85
End: 2025-04-08
Payer: MEDICARE

## 2025-04-08 VITALS
TEMPERATURE: 98.4 F | SYSTOLIC BLOOD PRESSURE: 124 MMHG | HEART RATE: 75 BPM | OXYGEN SATURATION: 96 % | DIASTOLIC BLOOD PRESSURE: 86 MMHG | RESPIRATION RATE: 18 BRPM

## 2025-04-08 PROCEDURE — G0300 HHS/HOSPICE OF LPN EA 15 MIN: HCPCS

## 2025-04-08 ASSESSMENT — ENCOUNTER SYMPTOMS
PAIN LOCATION - PAIN SEVERITY: 3/10
LOWER EXTREMITY EDEMA: 1
MUSCLE WEAKNESS: 1
SUBJECTIVE PAIN PROGRESSION: UNCHANGED
PERSON REPORTING PAIN: PATIENT
PAIN LOCATION - PAIN SEVERITY: 3/10
BOWEL PATTERN NORMAL: 1
PAIN LOCATION - PAIN FREQUENCY: FREQUENT
PAIN: 1
LOWEST PAIN SEVERITY IN PAST 24 HOURS: 0/10
PAIN LOCATION: RIGHT KNEE
HIGHEST PAIN SEVERITY IN PAST 24 HOURS: 5/10
CHANGE IN APPETITE: UNCHANGED
PAIN SEVERITY GOAL: 0/10
PAIN LOCATION - PAIN FREQUENCY: FREQUENT
DRY SKIN: 1
LAST BOWEL MOVEMENT: 67303
APPETITE LEVEL: GOOD
PAIN LOCATION: LEFT KNEE

## 2025-04-08 ASSESSMENT — ACTIVITIES OF DAILY LIVING (ADL): MONEY MANAGEMENT (EXPENSES/BILLS): INDEPENDENT

## 2025-04-08 NOTE — HOME HEALTH
Patient seen today for routine suprapubic catheter change. Old catheter removed without complication. #16F silicone Deal catheter inserted utilizing sterile technique, balloon inflated with 10ml SNS, without complication. Patient tolerated procedure well with no complaints of pain or discomfort. Clear, yellow-colored urine observed to be flowing freely through catheter tubing to leg bag. All VS WNL. Lung sounds CTA. No changes in medication at this time.

## 2025-04-11 ENCOUNTER — HOME CARE VISIT (OUTPATIENT)
Dept: HOME HEALTH SERVICES | Facility: HOME HEALTH | Age: 85
End: 2025-04-11
Payer: MEDICARE

## 2025-04-11 VITALS
RESPIRATION RATE: 18 BRPM | SYSTOLIC BLOOD PRESSURE: 138 MMHG | DIASTOLIC BLOOD PRESSURE: 80 MMHG | TEMPERATURE: 98.8 F | HEART RATE: 86 BPM | OXYGEN SATURATION: 95 %

## 2025-04-11 PROCEDURE — G0299 HHS/HOSPICE OF RN EA 15 MIN: HCPCS

## 2025-04-11 ASSESSMENT — ENCOUNTER SYMPTOMS
LOWER EXTREMITY EDEMA: 1
PAIN SEVERITY GOAL: 0/10
HIGHEST PAIN SEVERITY IN PAST 24 HOURS: 0/10
APPETITE LEVEL: GOOD
LOWEST PAIN SEVERITY IN PAST 24 HOURS: 0/10
MUSCLE WEAKNESS: 1

## 2025-04-11 ASSESSMENT — ACTIVITIES OF DAILY LIVING (ADL)
OASIS_M1830: 03
ENTERING_EXITING_HOME: STAND BY ASSIST

## 2025-04-11 ASSESSMENT — PAIN SCALES - PAIN ASSESSMENT IN ADVANCED DEMENTIA (PAINAD): BREATHING: 0

## 2025-04-24 ENCOUNTER — HOME CARE VISIT (OUTPATIENT)
Dept: HOME HEALTH SERVICES | Facility: HOME HEALTH | Age: 85
End: 2025-04-24
Payer: MEDICARE

## 2025-04-24 VITALS
HEART RATE: 83 BPM | OXYGEN SATURATION: 98 % | TEMPERATURE: 97.9 F | DIASTOLIC BLOOD PRESSURE: 70 MMHG | RESPIRATION RATE: 18 BRPM | SYSTOLIC BLOOD PRESSURE: 142 MMHG

## 2025-04-24 PROCEDURE — G0300 HHS/HOSPICE OF LPN EA 15 MIN: HCPCS

## 2025-04-24 ASSESSMENT — ACTIVITIES OF DAILY LIVING (ADL): MONEY MANAGEMENT (EXPENSES/BILLS): INDEPENDENT

## 2025-04-24 ASSESSMENT — ENCOUNTER SYMPTOMS
LOWEST PAIN SEVERITY IN PAST 24 HOURS: 0/10
CHANGE IN APPETITE: UNCHANGED
SUBJECTIVE PAIN PROGRESSION: UNCHANGED
PAIN SEVERITY GOAL: 0/10
LAST BOWEL MOVEMENT: 67319
HIGHEST PAIN SEVERITY IN PAST 24 HOURS: 3/10
LOWER EXTREMITY EDEMA: 1
MUSCLE WEAKNESS: 1
DENIES PAIN: 1
BOWEL PATTERN NORMAL: 1
PERSON REPORTING PAIN: PATIENT
APPETITE LEVEL: GOOD
DRY SKIN: 1

## 2025-04-24 NOTE — HOME HEALTH
Patient seen today for suprapubic catheter irrigation. Suprapubic catheter irrigated with 50ml SNS without complication, patient tolerated procedure well. Clear yellow-colored urine observed to be flowing freely through catheter tubing to leg bag. All VS WNL. Lung sounds CTA. +1 non-pitting edema observed to B/L LE. No changes in medication at this time.

## 2025-05-07 ENCOUNTER — HOME CARE VISIT (OUTPATIENT)
Dept: HOME HEALTH SERVICES | Facility: HOME HEALTH | Age: 85
End: 2025-05-07
Payer: MEDICARE

## 2025-05-07 VITALS
DIASTOLIC BLOOD PRESSURE: 74 MMHG | HEART RATE: 80 BPM | RESPIRATION RATE: 18 BRPM | OXYGEN SATURATION: 97 % | SYSTOLIC BLOOD PRESSURE: 132 MMHG | TEMPERATURE: 97.9 F

## 2025-05-07 PROCEDURE — G0300 HHS/HOSPICE OF LPN EA 15 MIN: HCPCS

## 2025-05-07 ASSESSMENT — ENCOUNTER SYMPTOMS
PAIN SEVERITY GOAL: 0/10
CHANGE IN APPETITE: UNCHANGED
SUBJECTIVE PAIN PROGRESSION: UNCHANGED
LOWER EXTREMITY EDEMA: 1
LAST BOWEL MOVEMENT: 67332
BOWEL PATTERN NORMAL: 1
APPETITE LEVEL: GOOD
PAIN LOCATION: GENERALIZED
PAIN LOCATION - PAIN SEVERITY: 3/10
PAIN: 1
DRY SKIN: 1
HIGHEST PAIN SEVERITY IN PAST 24 HOURS: 4/10
LOWEST PAIN SEVERITY IN PAST 24 HOURS: 0/10
PERSON REPORTING PAIN: PATIENT
FATIGUE: 1
MUSCLE WEAKNESS: 1
FATIGUES EASILY: 1

## 2025-05-07 ASSESSMENT — ACTIVITIES OF DAILY LIVING (ADL): MONEY MANAGEMENT (EXPENSES/BILLS): INDEPENDENT

## 2025-05-07 NOTE — HOME HEALTH
Patient seen today for routine suprapubic catheter change. Removed old catheter. #16F silicone catheter, inserted utilizing aseptic technique, balloon inflated with 10ml of SNS, without complication. Patient tolerated procedure well with no complaints of pain or discomfort. Clear yellow-colored urine observed to be flowing freely through catheter tubing to leg bag. All VS WNL. No changes in medications at this time.

## 2025-05-15 ENCOUNTER — APPOINTMENT (OUTPATIENT)
Dept: VASCULAR MEDICINE | Facility: HOSPITAL | Age: 85
DRG: 683 | End: 2025-05-15
Payer: MEDICARE

## 2025-05-15 ENCOUNTER — APPOINTMENT (OUTPATIENT)
Dept: RADIOLOGY | Facility: HOSPITAL | Age: 85
DRG: 683 | End: 2025-05-15
Payer: MEDICARE

## 2025-05-15 ENCOUNTER — APPOINTMENT (OUTPATIENT)
Dept: CARDIOLOGY | Facility: HOSPITAL | Age: 85
DRG: 683 | End: 2025-05-15
Payer: MEDICARE

## 2025-05-15 ENCOUNTER — HOSPITAL ENCOUNTER (INPATIENT)
Facility: HOSPITAL | Age: 85
DRG: 683 | End: 2025-05-15
Attending: EMERGENCY MEDICINE | Admitting: INTERNAL MEDICINE
Payer: MEDICARE

## 2025-05-15 DIAGNOSIS — I73.9 PERIPHERAL ARTERIAL DISEASE: ICD-10-CM

## 2025-05-15 DIAGNOSIS — I50.9 CONGESTIVE HEART FAILURE, UNSPECIFIED HF CHRONICITY, UNSPECIFIED HEART FAILURE TYPE: ICD-10-CM

## 2025-05-15 DIAGNOSIS — C61 PROSTATE CANCER (MULTI): ICD-10-CM

## 2025-05-15 DIAGNOSIS — E11.22 TYPE 2 DIABETES MELLITUS WITH STAGE 3B CHRONIC KIDNEY DISEASE, WITH LONG-TERM CURRENT USE OF INSULIN (MULTI): ICD-10-CM

## 2025-05-15 DIAGNOSIS — N18.32 TYPE 2 DIABETES MELLITUS WITH STAGE 3B CHRONIC KIDNEY DISEASE, WITH LONG-TERM CURRENT USE OF INSULIN (MULTI): ICD-10-CM

## 2025-05-15 DIAGNOSIS — I48.91 ATRIAL FIBRILLATION, UNSPECIFIED TYPE (MULTI): ICD-10-CM

## 2025-05-15 DIAGNOSIS — R22.43 LOCALIZED SWELLING, MASS AND LUMP, LOWER LIMB, BILATERAL: ICD-10-CM

## 2025-05-15 DIAGNOSIS — Z79.4 TYPE 2 DIABETES MELLITUS WITH STAGE 3B CHRONIC KIDNEY DISEASE, WITH LONG-TERM CURRENT USE OF INSULIN (MULTI): ICD-10-CM

## 2025-05-15 DIAGNOSIS — N17.9 AKI (ACUTE KIDNEY INJURY): ICD-10-CM
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
## 2025-05-15 DIAGNOSIS — N31.9 NEUROGENIC BLADDER: ICD-10-CM

## 2025-05-15 DIAGNOSIS — M88.9 PAGET'S DISEASE OF BONE: ICD-10-CM

## 2025-05-15 DIAGNOSIS — I10 PRIMARY HYPERTENSION: ICD-10-CM

## 2025-05-15 DIAGNOSIS — I50.22 CHRONIC SYSTOLIC HEART FAILURE: ICD-10-CM

## 2025-05-15 DIAGNOSIS — I50.30 UNSPECIFIED DIASTOLIC (CONGESTIVE) HEART FAILURE: ICD-10-CM

## 2025-05-15 DIAGNOSIS — Z95.1 S/P CABG (CORONARY ARTERY BYPASS GRAFT): ICD-10-CM

## 2025-05-15 DIAGNOSIS — I48.0 PAROXYSMAL ATRIAL FIBRILLATION (MULTI): ICD-10-CM

## 2025-05-15 DIAGNOSIS — R06.00 DYSPNEA, UNSPECIFIED: ICD-10-CM

## 2025-05-15 DIAGNOSIS — I50.20 UNSPECIFIED SYSTOLIC (CONGESTIVE) HEART FAILURE: ICD-10-CM

## 2025-05-15 DIAGNOSIS — R79.89 ELEVATED D-DIMER: ICD-10-CM

## 2025-05-15 DIAGNOSIS — R06.09 DOE (DYSPNEA ON EXERTION): ICD-10-CM

## 2025-05-15 DIAGNOSIS — M25.551 RIGHT HIP PAIN: ICD-10-CM

## 2025-05-15 LAB
ALBUMIN SERPL BCP-MCNC: 3.4 G/DL (ref 3.4–5)
ALP SERPL-CCNC: 207 U/L (ref 33–136)
ALT SERPL W P-5'-P-CCNC: 8 U/L (ref 10–52)
ANION GAP SERPL CALC-SCNC: 14 MMOL/L (ref 10–20)
AST SERPL W P-5'-P-CCNC: 13 U/L (ref 9–39)
BASOPHILS # BLD AUTO: 0.07 X10*3/UL (ref 0–0.1)
BASOPHILS NFR BLD AUTO: 1.1 %
BILIRUB SERPL-MCNC: 1.1 MG/DL (ref 0–1.2)
BNP SERPL-MCNC: 380 PG/ML (ref 0–99)
BUN SERPL-MCNC: 28 MG/DL (ref 6–23)
CALCIUM SERPL-MCNC: 8.6 MG/DL (ref 8.6–10.3)
CARDIAC TROPONIN I PNL SERPL HS: 15 NG/L (ref 0–20)
CHLORIDE SERPL-SCNC: 103 MMOL/L (ref 98–107)
CO2 SERPL-SCNC: 28 MMOL/L (ref 21–32)
CREAT SERPL-MCNC: 2.43 MG/DL (ref 0.5–1.3)
D DIMER PPP FEU-MCNC: 1934 NG/ML FEU
EGFRCR SERPLBLD CKD-EPI 2021: 26 ML/MIN/1.73M*2
EOSINOPHIL # BLD AUTO: 0.22 X10*3/UL (ref 0–0.4)
EOSINOPHIL NFR BLD AUTO: 3.3 %
ERYTHROCYTE [DISTWIDTH] IN BLOOD BY AUTOMATED COUNT: 13.4 % (ref 11.5–14.5)
GLUCOSE BLD MANUAL STRIP-MCNC: 177 MG/DL (ref 74–99)
GLUCOSE BLD MANUAL STRIP-MCNC: 222 MG/DL (ref 74–99)
GLUCOSE SERPL-MCNC: 100 MG/DL (ref 74–99)
HCT VFR BLD AUTO: 40.7 % (ref 41–52)
HGB BLD-MCNC: 12.8 G/DL (ref 13.5–17.5)
IMM GRANULOCYTES # BLD AUTO: 0.03 X10*3/UL (ref 0–0.5)
IMM GRANULOCYTES NFR BLD AUTO: 0.5 % (ref 0–0.9)
LYMPHOCYTES # BLD AUTO: 0.82 X10*3/UL (ref 0.8–3)
LYMPHOCYTES NFR BLD AUTO: 12.3 %
MAGNESIUM SERPL-MCNC: 1.77 MG/DL (ref 1.6–2.4)
MCH RBC QN AUTO: 29.2 PG (ref 26–34)
MCHC RBC AUTO-ENTMCNC: 31.4 G/DL (ref 32–36)
MCV RBC AUTO: 93 FL (ref 80–100)
MONOCYTES # BLD AUTO: 0.53 X10*3/UL (ref 0.05–0.8)
MONOCYTES NFR BLD AUTO: 8 %
NEUTROPHILS # BLD AUTO: 4.98 X10*3/UL (ref 1.6–5.5)
NEUTROPHILS NFR BLD AUTO: 74.8 %
NRBC BLD-RTO: 0 /100 WBCS (ref 0–0)
PLATELET # BLD AUTO: 223 X10*3/UL (ref 150–450)
POTASSIUM SERPL-SCNC: 3.6 MMOL/L (ref 3.5–5.3)
PROT SERPL-MCNC: 6.6 G/DL (ref 6.4–8.2)
RBC # BLD AUTO: 4.38 X10*6/UL (ref 4.5–5.9)
SODIUM SERPL-SCNC: 141 MMOL/L (ref 136–145)
WBC # BLD AUTO: 6.7 X10*3/UL (ref 4.4–11.3)

## 2025-05-15 PROCEDURE — 84484 ASSAY OF TROPONIN QUANT: CPT | Performed by: EMERGENCY MEDICINE

## 2025-05-15 PROCEDURE — 2500000002 HC RX 250 W HCPCS SELF ADMINISTERED DRUGS (ALT 637 FOR MEDICARE OP, ALT 636 FOR OP/ED): Performed by: INTERNAL MEDICINE

## 2025-05-15 PROCEDURE — 80053 COMPREHEN METABOLIC PANEL: CPT | Performed by: EMERGENCY MEDICINE

## 2025-05-15 PROCEDURE — 83735 ASSAY OF MAGNESIUM: CPT | Performed by: EMERGENCY MEDICINE

## 2025-05-15 PROCEDURE — 73502 X-RAY EXAM HIP UNI 2-3 VIEWS: CPT | Mod: RIGHT SIDE | Performed by: RADIOLOGY

## 2025-05-15 PROCEDURE — 73700 CT LOWER EXTREMITY W/O DYE: CPT | Mod: RT

## 2025-05-15 PROCEDURE — 99223 1ST HOSP IP/OBS HIGH 75: CPT | Performed by: INTERNAL MEDICINE

## 2025-05-15 PROCEDURE — 93005 ELECTROCARDIOGRAM TRACING: CPT

## 2025-05-15 PROCEDURE — 85025 COMPLETE CBC W/AUTO DIFF WBC: CPT | Performed by: EMERGENCY MEDICINE

## 2025-05-15 PROCEDURE — 73502 X-RAY EXAM HIP UNI 2-3 VIEWS: CPT | Mod: RT

## 2025-05-15 PROCEDURE — 36415 COLL VENOUS BLD VENIPUNCTURE: CPT | Performed by: EMERGENCY MEDICINE

## 2025-05-15 PROCEDURE — 93970 EXTREMITY STUDY: CPT | Performed by: SURGERY

## 2025-05-15 PROCEDURE — 73700 CT LOWER EXTREMITY W/O DYE: CPT | Mod: RIGHT SIDE | Performed by: RADIOLOGY

## 2025-05-15 PROCEDURE — 1200000002 HC GENERAL ROOM WITH TELEMETRY DAILY

## 2025-05-15 PROCEDURE — 96360 HYDRATION IV INFUSION INIT: CPT

## 2025-05-15 PROCEDURE — 82947 ASSAY GLUCOSE BLOOD QUANT: CPT

## 2025-05-15 PROCEDURE — 85379 FIBRIN DEGRADATION QUANT: CPT | Performed by: EMERGENCY MEDICINE

## 2025-05-15 PROCEDURE — 2500000004 HC RX 250 GENERAL PHARMACY W/ HCPCS (ALT 636 FOR OP/ED): Mod: JZ | Performed by: INTERNAL MEDICINE

## 2025-05-15 PROCEDURE — 2500000004 HC RX 250 GENERAL PHARMACY W/ HCPCS (ALT 636 FOR OP/ED): Mod: JZ | Performed by: EMERGENCY MEDICINE

## 2025-05-15 PROCEDURE — 2500000001 HC RX 250 WO HCPCS SELF ADMINISTERED DRUGS (ALT 637 FOR MEDICARE OP): Performed by: INTERNAL MEDICINE

## 2025-05-15 PROCEDURE — 99285 EMERGENCY DEPT VISIT HI MDM: CPT | Mod: 25 | Performed by: EMERGENCY MEDICINE

## 2025-05-15 PROCEDURE — 93970 EXTREMITY STUDY: CPT

## 2025-05-15 PROCEDURE — 83880 ASSAY OF NATRIURETIC PEPTIDE: CPT | Performed by: EMERGENCY MEDICINE

## 2025-05-15 PROCEDURE — 96361 HYDRATE IV INFUSION ADD-ON: CPT

## 2025-05-15 RX ORDER — SODIUM CHLORIDE 9 MG/ML
100 INJECTION, SOLUTION INTRAVENOUS CONTINUOUS
Status: ACTIVE | OUTPATIENT
Start: 2025-05-15 | End: 2025-05-16

## 2025-05-15 RX ORDER — MORPHINE SULFATE 2 MG/ML
2 INJECTION, SOLUTION INTRAMUSCULAR; INTRAVENOUS EVERY 4 HOURS PRN
Status: DISCONTINUED | OUTPATIENT
Start: 2025-05-15 | End: 2025-05-20 | Stop reason: HOSPADM

## 2025-05-15 RX ORDER — HEPARIN SODIUM 5000 [USP'U]/ML
5000 INJECTION, SOLUTION INTRAVENOUS; SUBCUTANEOUS EVERY 8 HOURS
Status: DISCONTINUED | OUTPATIENT
Start: 2025-05-15 | End: 2025-05-17

## 2025-05-15 RX ORDER — DEXTROSE 50 % IN WATER (D50W) INTRAVENOUS SYRINGE
25
Status: DISCONTINUED | OUTPATIENT
Start: 2025-05-15 | End: 2025-05-20 | Stop reason: HOSPADM

## 2025-05-15 RX ORDER — INSULIN GLARGINE 100 [IU]/ML
24 INJECTION, SOLUTION SUBCUTANEOUS EVERY MORNING
Qty: 15 ML | Refills: 1 | Status: ON HOLD | OUTPATIENT
Start: 2025-05-15

## 2025-05-15 RX ORDER — OXYCODONE AND ACETAMINOPHEN 5; 325 MG/1; MG/1
1 TABLET ORAL EVERY 6 HOURS PRN
Status: DISCONTINUED | OUTPATIENT
Start: 2025-05-15 | End: 2025-05-20 | Stop reason: HOSPADM

## 2025-05-15 RX ORDER — INSULIN LISPRO 100 [IU]/ML
0-5 INJECTION, SOLUTION INTRAVENOUS; SUBCUTANEOUS
Status: DISCONTINUED | OUTPATIENT
Start: 2025-05-15 | End: 2025-05-16

## 2025-05-15 RX ORDER — METOPROLOL TARTRATE 25 MG/1
25 TABLET, FILM COATED ORAL 2 TIMES DAILY
Status: DISCONTINUED | OUTPATIENT
Start: 2025-05-15 | End: 2025-05-20 | Stop reason: HOSPADM

## 2025-05-15 RX ORDER — ATORVASTATIN CALCIUM 80 MG/1
80 TABLET, FILM COATED ORAL DAILY
Status: DISCONTINUED | OUTPATIENT
Start: 2025-05-15 | End: 2025-05-20 | Stop reason: HOSPADM

## 2025-05-15 RX ORDER — INSULIN GLARGINE 100 [IU]/ML
20 INJECTION, SOLUTION SUBCUTANEOUS NIGHTLY
Status: DISCONTINUED | OUTPATIENT
Start: 2025-05-15 | End: 2025-05-17

## 2025-05-15 RX ORDER — DEXTROSE 50 % IN WATER (D50W) INTRAVENOUS SYRINGE
12.5
Status: DISCONTINUED | OUTPATIENT
Start: 2025-05-15 | End: 2025-05-20 | Stop reason: HOSPADM

## 2025-05-15 RX ORDER — DULAGLUTIDE 3 MG/.5ML
3 INJECTION, SOLUTION SUBCUTANEOUS
Qty: 6 ML | Refills: 1 | Status: ON HOLD | OUTPATIENT
Start: 2025-05-18

## 2025-05-15 RX ORDER — NAPROXEN SODIUM 220 MG/1
81 TABLET, FILM COATED ORAL DAILY
Status: DISCONTINUED | OUTPATIENT
Start: 2025-05-15 | End: 2025-05-20 | Stop reason: HOSPADM

## 2025-05-15 RX ORDER — ISOSORBIDE MONONITRATE 30 MG/1
30 TABLET, EXTENDED RELEASE ORAL DAILY
Status: DISCONTINUED | OUTPATIENT
Start: 2025-05-15 | End: 2025-05-20 | Stop reason: HOSPADM

## 2025-05-15 RX ADMIN — ATORVASTATIN CALCIUM 80 MG: 80 TABLET, FILM COATED ORAL at 14:51

## 2025-05-15 RX ADMIN — INSULIN GLARGINE 20 UNITS: 100 INJECTION, SOLUTION SUBCUTANEOUS at 21:52

## 2025-05-15 RX ADMIN — SODIUM CHLORIDE 500 ML: 0.9 INJECTION, SOLUTION INTRAVENOUS at 09:19

## 2025-05-15 RX ADMIN — ASPIRIN 81 MG CHEWABLE TABLET 81 MG: 81 TABLET CHEWABLE at 14:51

## 2025-05-15 RX ADMIN — HEPARIN SODIUM 5000 UNITS: 5000 INJECTION, SOLUTION INTRAVENOUS; SUBCUTANEOUS at 21:53

## 2025-05-15 RX ADMIN — METOPROLOL TARTRATE 25 MG: 25 TABLET, FILM COATED ORAL at 18:46

## 2025-05-15 RX ADMIN — INSULIN LISPRO 2 UNITS: 100 INJECTION, SOLUTION INTRAVENOUS; SUBCUTANEOUS at 18:51

## 2025-05-15 RX ADMIN — ISOSORBIDE MONONITRATE 30 MG: 30 TABLET, EXTENDED RELEASE ORAL at 14:51

## 2025-05-15 RX ADMIN — SODIUM CHLORIDE 100 ML/HR: 0.9 INJECTION, SOLUTION INTRAVENOUS at 14:50

## 2025-05-15 SDOH — ECONOMIC STABILITY: FOOD INSECURITY: WITHIN THE PAST 12 MONTHS, YOU WORRIED THAT YOUR FOOD WOULD RUN OUT BEFORE YOU GOT THE MONEY TO BUY MORE.: NEVER TRUE

## 2025-05-15 SDOH — ECONOMIC STABILITY: FOOD INSECURITY: WITHIN THE PAST 12 MONTHS, THE FOOD YOU BOUGHT JUST DIDN'T LAST AND YOU DIDN'T HAVE MONEY TO GET MORE.: NEVER TRUE

## 2025-05-15 SDOH — SOCIAL STABILITY: SOCIAL INSECURITY: DO YOU FEEL UNSAFE GOING BACK TO THE PLACE WHERE YOU ARE LIVING?: NO

## 2025-05-15 SDOH — SOCIAL STABILITY: SOCIAL INSECURITY
WITHIN THE LAST YEAR, HAVE YOU BEEN RAPED OR FORCED TO HAVE ANY KIND OF SEXUAL ACTIVITY BY YOUR PARTNER OR EX-PARTNER?: NO

## 2025-05-15 SDOH — ECONOMIC STABILITY: INCOME INSECURITY: IN THE PAST 12 MONTHS HAS THE ELECTRIC, GAS, OIL, OR WATER COMPANY THREATENED TO SHUT OFF SERVICES IN YOUR HOME?: NO

## 2025-05-15 SDOH — SOCIAL STABILITY: SOCIAL INSECURITY: WITHIN THE LAST YEAR, HAVE YOU BEEN AFRAID OF YOUR PARTNER OR EX-PARTNER?: NO

## 2025-05-15 SDOH — SOCIAL STABILITY: SOCIAL INSECURITY: WITHIN THE LAST YEAR, HAVE YOU BEEN HUMILIATED OR EMOTIONALLY ABUSED IN OTHER WAYS BY YOUR PARTNER OR EX-PARTNER?: NO

## 2025-05-15 SDOH — SOCIAL STABILITY: SOCIAL INSECURITY
WITHIN THE LAST YEAR, HAVE YOU BEEN KICKED, HIT, SLAPPED, OR OTHERWISE PHYSICALLY HURT BY YOUR PARTNER OR EX-PARTNER?: NO

## 2025-05-15 SDOH — SOCIAL STABILITY: SOCIAL INSECURITY: DOES ANYONE TRY TO KEEP YOU FROM HAVING/CONTACTING OTHER FRIENDS OR DOING THINGS OUTSIDE YOUR HOME?: NO

## 2025-05-15 SDOH — SOCIAL STABILITY: SOCIAL INSECURITY: ARE THERE ANY APPARENT SIGNS OF INJURIES/BEHAVIORS THAT COULD BE RELATED TO ABUSE/NEGLECT?: NO

## 2025-05-15 SDOH — SOCIAL STABILITY: SOCIAL INSECURITY: ABUSE: ADULT

## 2025-05-15 SDOH — SOCIAL STABILITY: SOCIAL INSECURITY: WERE YOU ABLE TO COMPLETE ALL THE BEHAVIORAL HEALTH SCREENINGS?: YES

## 2025-05-15 SDOH — SOCIAL STABILITY: SOCIAL INSECURITY: ARE YOU OR HAVE YOU BEEN THREATENED OR ABUSED PHYSICALLY, EMOTIONALLY, OR SEXUALLY BY ANYONE?: NO

## 2025-05-15 SDOH — SOCIAL STABILITY: SOCIAL INSECURITY: DO YOU FEEL ANYONE HAS EXPLOITED OR TAKEN ADVANTAGE OF YOU FINANCIALLY OR OF YOUR PERSONAL PROPERTY?: NO

## 2025-05-15 SDOH — SOCIAL STABILITY: SOCIAL INSECURITY: HAVE YOU HAD THOUGHTS OF HARMING ANYONE ELSE?: NO

## 2025-05-15 SDOH — SOCIAL STABILITY: SOCIAL INSECURITY: HAVE YOU HAD ANY THOUGHTS OF HARMING ANYONE ELSE?: NO

## 2025-05-15 SDOH — SOCIAL STABILITY: SOCIAL INSECURITY: HAS ANYONE EVER THREATENED TO HURT YOUR FAMILY OR YOUR PETS?: NO

## 2025-05-15 ASSESSMENT — COGNITIVE AND FUNCTIONAL STATUS - GENERAL
STANDING UP FROM CHAIR USING ARMS: A LOT
CLIMB 3 TO 5 STEPS WITH RAILING: A LOT
WALKING IN HOSPITAL ROOM: A LOT
TOILETING: A LITTLE
DRESSING REGULAR UPPER BODY CLOTHING: A LITTLE
HELP NEEDED FOR BATHING: A LITTLE
DRESSING REGULAR LOWER BODY CLOTHING: A LOT
TURNING FROM BACK TO SIDE WHILE IN FLAT BAD: A LITTLE
MOVING FROM LYING ON BACK TO SITTING ON SIDE OF FLAT BED WITH BEDRAILS: A LITTLE
MOBILITY SCORE: 14
PATIENT BASELINE BEDBOUND: NO
MOVING TO AND FROM BED TO CHAIR: A LOT
DAILY ACTIVITIY SCORE: 19

## 2025-05-15 ASSESSMENT — LIFESTYLE VARIABLES
HOW OFTEN DO YOU HAVE 6 OR MORE DRINKS ON ONE OCCASION: NEVER
AUDIT-C TOTAL SCORE: 0
HOW OFTEN DO YOU HAVE A DRINK CONTAINING ALCOHOL: NEVER
SKIP TO QUESTIONS 9-10: 1
PRESCIPTION_ABUSE_PAST_12_MONTHS: NO
HOW MANY STANDARD DRINKS CONTAINING ALCOHOL DO YOU HAVE ON A TYPICAL DAY: PATIENT DOES NOT DRINK
AUDIT-C TOTAL SCORE: 0
SUBSTANCE_ABUSE_PAST_12_MONTHS: NO

## 2025-05-15 ASSESSMENT — ACTIVITIES OF DAILY LIVING (ADL)
FEEDING YOURSELF: INDEPENDENT
WALKS IN HOME: NEEDS ASSISTANCE
HEARING - RIGHT EAR: FUNCTIONAL
GROOMING: INDEPENDENT
JUDGMENT_ADEQUATE_SAFELY_COMPLETE_DAILY_ACTIVITIES: YES
ADEQUATE_TO_COMPLETE_ADL: YES
TOILETING: NEEDS ASSISTANCE
DRESSING YOURSELF: NEEDS ASSISTANCE
HEARING - LEFT EAR: FUNCTIONAL
LACK_OF_TRANSPORTATION: NO
PATIENT'S MEMORY ADEQUATE TO SAFELY COMPLETE DAILY ACTIVITIES?: YES
ASSISTIVE_DEVICE: WALKER
BATHING: NEEDS ASSISTANCE

## 2025-05-15 ASSESSMENT — ENCOUNTER SYMPTOMS
MYALGIAS: 0
EYE DISCHARGE: 0
ABDOMINAL PAIN: 0
SHORTNESS OF BREATH: 0
ACTIVITY CHANGE: 0
CHEST TIGHTNESS: 0
CHILLS: 0
AGITATION: 0
COUGH: 0
CONFUSION: 0
NECK PAIN: 0
CONSTIPATION: 0
HEADACHES: 0
NUMBNESS: 0
DYSURIA: 0
FEVER: 0
COLOR CHANGE: 0
PALPITATIONS: 0
SEIZURES: 0
APPETITE CHANGE: 0
BACK PAIN: 0
SPEECH DIFFICULTY: 0
DIZZINESS: 0
EYE PAIN: 0
NAUSEA: 0
DIFFICULTY URINATING: 0
DIARRHEA: 0
VOMITING: 0

## 2025-05-15 ASSESSMENT — COLUMBIA-SUICIDE SEVERITY RATING SCALE - C-SSRS
1. IN THE PAST MONTH, HAVE YOU WISHED YOU WERE DEAD OR WISHED YOU COULD GO TO SLEEP AND NOT WAKE UP?: NO
2. HAVE YOU ACTUALLY HAD ANY THOUGHTS OF KILLING YOURSELF?: NO
6. HAVE YOU EVER DONE ANYTHING, STARTED TO DO ANYTHING, OR PREPARED TO DO ANYTHING TO END YOUR LIFE?: NO
6. HAVE YOU EVER DONE ANYTHING, STARTED TO DO ANYTHING, OR PREPARED TO DO ANYTHING TO END YOUR LIFE?: NO
2. HAVE YOU ACTUALLY HAD ANY THOUGHTS OF KILLING YOURSELF?: NO
1. IN THE PAST MONTH, HAVE YOU WISHED YOU WERE DEAD OR WISHED YOU COULD GO TO SLEEP AND NOT WAKE UP?: NO

## 2025-05-15 ASSESSMENT — PATIENT HEALTH QUESTIONNAIRE - PHQ9
SUM OF ALL RESPONSES TO PHQ9 QUESTIONS 1 & 2: 0
1. LITTLE INTEREST OR PLEASURE IN DOING THINGS: NOT AT ALL
2. FEELING DOWN, DEPRESSED OR HOPELESS: NOT AT ALL

## 2025-05-15 ASSESSMENT — PAIN - FUNCTIONAL ASSESSMENT: PAIN_FUNCTIONAL_ASSESSMENT: 0-10

## 2025-05-15 ASSESSMENT — PAIN SCALES - GENERAL: PAINLEVEL_OUTOF10: 0 - NO PAIN

## 2025-05-15 NOTE — ED TRIAGE NOTES
Pt arrives to ED via EMS from home. Pykaley reportedly has had leg pain for 5 days pt w3as sitting on walker and attempted to stand when leg gave out. Pt is A&Ox4 and usually ambulatory. Pt is poor historian

## 2025-05-15 NOTE — PROGRESS NOTES
Pharmacy Medication History Review    Dennis Galo is a 85 y.o. male admitted for Localized swelling, mass and lump, lower limb, bilateral. Pharmacy reviewed the patient's jlieq-op-mabwmrsws medications and allergies for accuracy.    The list below reflectives the updated PTA list. Please review each medication in order reconciliation for additional clarification and justification.  Prior to Admission medications    Medication Sig Start Date End Date Taking? Authorizing Provider   ammonium lactate (Amlactin) 12 % cream Apply 1 Application topically if needed for dry skin.   Yes Historical Provider, MD   aspirin 81 mg chewable tablet Chew 1 tablet (81 mg) once daily. 7/26/18  Yes Historical Provider, MD   atorvastatin (Lipitor) 80 mg tablet Take 1 tablet (80 mg) by mouth once daily.  Patient taking differently: Take 1 tablet (80 mg) by mouth once daily at bedtime. 7/8/24 7/8/25 Yes Triston Samson MD   blood sugar diagnostic (OneTouch Ultra Test) strip USE ONCE DAILY AS DIRECTED 7/18/24  Yes Triston Samson MD   dulaglutide 3 mg/0.5 mL pen injector Inject 3 mg under the skin 1 (one) time per week.  Patient taking differently: Inject 3 mg under the skin 1 (one) time per week. Every Sunday 7/14/24  Yes Triston Samson MD   FreeStyle glucose monitoring (OneTouch Ultra2 Meter) kit As directed 1/23/24  Yes Triston Samson MD   insulin glargine (Lantus Solostar U-100 Insulin) 100 unit/mL (3 mL) pen INJECT 24 UNITS UNDER THE SKIN ONCE DAILY IN THE MORNING. 1/13/25  Yes Triston Samson MD   isosorbide mononitrate ER (Imdur) 30 mg 24 hr tablet Take 1 tablet (30 mg) by mouth once daily. 7/18/24 7/18/25 Yes Triston Samson MD   lancets (OneTouch UltraSoft Lancets) misc Use once daily 1/23/24  Yes Triston Samson MD   metoprolol tartrate (Lopressor) 25 mg tablet TAKE 1 TABLET BY MOUTH TWICE A DAY  Patient taking differently: Take 1 tablet (25 mg) by mouth 2 times a day. 4/7/25  Yes Triston Samson MD  "  nystatin (Mycostatin) 100,000 unit/gram powder Apply 1 Application topically 2 times a day. 2/5/25 2/5/26 Yes Triston Samson MD   pen needle, diabetic (BD Arelis 2nd Gen Pen Needle) 32 gauge x 5/32\" needle USE ONCE DAILY 8/28/24  Yes Triston Samson MD   torsemide (Demadex) 20 mg tablet TAKE 3 TABLETS (60 MG) BY MOUTH ONCE DAILY. 10/14/24 5/15/25 Yes Triston Samson MD   gabapentin (Neurontin) 100 mg capsule Take 1 capsule (100 mg) by mouth 3 times a day.  Patient not taking: Reported on 5/15/2025   no Historical Provider, MD   leuprolide acetate (LEUPROLIDE, BULK, MISC) Inject 5 mg as directed every 6 months. Lupron 5 MG- by injection into muscle every 6 months   Patient not taking: Reported on 5/15/2025   no Historical Provider, MD   SITagliptin phosphate (Januvia) 100 mg tablet Take 1 tablet (100 mg) by mouth once daily.  Patient not taking: Reported on 5/15/2025   no Historical Provider, MD       The list below reflectives the updated allergy list. Please review each documented allergy for additional clarification and justification.  Allergies  Reviewed by Michelle Yarbrough on 5/15/2025   No Known Allergies         Below are additional concerns with the patient's PTA list.      Michelle Yarbrough    "

## 2025-05-15 NOTE — H&P
History Of Present Illness  Dennis Galo is a 85 y.o. male With past medical history of diabetes, CHF, CKD, prostate cancer presented to the hospital with chief complaint of leg pain.   Patient reported his neck pain started couple days ago and he was sitting on walker and was attempting to stand up and his leg gave out.  He denies any other symptoms like fever, chills.  He reported he is ambulatory at baseline.     Past Medical History  He has a past medical history of CAD (coronary artery disease), CHF (congestive heart failure), Chronic indwelling Deal catheter, Chronic renal disease, stage III (Multi), Diabetes mellitus (Multi), Eczema, Hiatal hernia, Hypertension, Infarction of spleen, Intracardiac thrombosis, not elsewhere classified, Old myocardial infarction, Prostate cancer (Multi), Pulmonary nodules, Renal cyst, and TIA (transient ischemic attack).    Surgical History  He has a past surgical history that includes Pilonidal cyst drainage (07/26/2018); Cardiac catheterization (2013); Prostate surgery; and Coronary artery bypass graft.     Social History  He reports that he quit smoking about 17 years ago. His smoking use included cigarettes. He has been exposed to tobacco smoke. He has never used smokeless tobacco. He reports that he does not drink alcohol and does not use drugs.    Family History  Family History[1]     Allergies  Patient has no known allergies.    Review of Systems   Constitutional:  Negative for activity change, appetite change, chills and fever.   HENT:  Negative for congestion.    Eyes:  Negative for pain and discharge.   Respiratory:  Negative for cough, chest tightness and shortness of breath.    Cardiovascular:  Negative for chest pain, palpitations and leg swelling.   Gastrointestinal:  Negative for abdominal pain, constipation, diarrhea, nausea and vomiting.   Endocrine: Negative for cold intolerance and heat intolerance.   Genitourinary:  Negative for difficulty urinating and  dysuria.   Musculoskeletal:  Negative for back pain, myalgias and neck pain.   Skin:  Negative for color change and rash.   Neurological:  Negative for dizziness, seizures, speech difficulty, numbness and headaches.   Psychiatric/Behavioral:  Negative for agitation, behavioral problems and confusion.         Physical Exam     Constitutional: No acute distress, awake, alert  Head/Neck: Neck supple  Respiratory/Thorax: Lungs are Clear to auscultation  Cardiovascular: Regular, rate and rhythm,  2+ equal pulses of the extremities, normal S 1and S 2  Gastrointestinal: Nondistended, soft, non-tender, no rebound tenderness or guarding  Extremities: No edema. No calf tenderness.  Neurological: Awake and alert. No focal neurological deficits  Psychological: Appropriate mood and behavior    Last Recorded Vitals  BP (!) 196/94   Pulse 76   Temp 37 °C (98.6 °F)   Resp 19   Wt 104 kg (230 lb)   SpO2 94%     Relevant Results      Vascular US lower extremity venous duplex bilateral  Result Date: 5/15/2025           [place] [address] Tel [phone] and Fax [phone]  Vascular Lab Report [facility] US LOWER EXTREMITY VENOUS DUPLEX BILATERAL  Patient Name:      BEVERLY KO JENNY   Reading Physician:  [id number] Danette Leahy MD Study Date:        5/15/2025           Ordering Physician: [id number] HEDRICK MRN/PID:           [id number]            Technologist:       Lucía Ledezma RVT Accession#:        [id number]        Technologist 2: Date of Birth/Age: 1940 / 85 years Encounter#:         [id number] Gender:            M Admission Status:  Emergency           Location Performed: [facility]  Diagnosis/ICD: Localized swelling, bilateral lower extremities-R22.43 CPT Codes:     39281 Peripheral venous duplex scan for DVT complete  CONCLUSIONS: Right Lower Venous: No evidence of acute deep vein thrombus visualized in the right lower extremity. Left Lower Venous: No evidence of acute deep vein  thrombus visualized in the left lower extremity. Cannot rule out thrombus in non-visualized posterior tibial and peroneal veins due to edema.  Additional Findings: Pulsatile flow throughout bilateral venous system suggesting volume overload or elevated cardiac filling pressures.  Imaging & Doppler Findings:  Right                 Compressible Thrombus   Flow Distal External Iliac     Yes        None   Pulsatile CFV                       Yes        None   Pulsatile PFV                       Yes        None FV Proximal               Yes        None   Pulsatile FV Mid                    Yes        None FV Distal                 Yes        None Popliteal                 Yes        None   Pulsatile Peroneal                  Yes        None PTV                       Yes        None  Left                  Compress Thrombus   Flow Distal External Iliac   Yes      None   Pulsatile CFV                     Yes      None   Pulsatile PFV                     Yes      None FV Proximal             Yes      None   Pulsatile FV Mid                  Yes      None FV Distal               Yes      None Popliteal               Yes      None   Pulsatile  [id number] Danette Leahy MD Electronically signed by [id number] Danette Leahy MD on 5/15/2025 at 11:18:36 AM  ** Final **     XR hip right with pelvis when performed 2 or 3 views  Result Date: 5/15/2025  STUDY: Pelvis and Right Hip Radiographs; 5/15/2025, 0812 INDICATION: Fall. COMPARISON: None Available. ACCESSION NUMBER(S): [id number] ORDERING CLINICIAN: JOSELITO HEDRICK TECHNIQUE:  AP view of the pelvis and 2 view(s) of the right hip. FINDINGS:  PELVIS: The pelvic ring is intact.  There is no acute fracture. RIGHT HIP: The osseous structures are intact with no evidence for acute fracture or focal destruction.  Mottled appearance of the right ischium suggestive of Paget's disease.  The alignment is anatomic.  Vascular atherosclerotic changes identified at the medial soft tissues.    1.  No evidence for  acute fracture or dislocation. 2.  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 Signed by Lorenzo May MD    Results for orders placed or performed during the hospital encounter of 05/15/25 (from the past 24 hours)   CBC and Auto Differential   Result Value Ref Range    WBC 6.7 4.4 - 11.3 x10*3/uL    nRBC 0.0 0.0 - 0.0 /100 WBCs    RBC 4.38 (L) 4.50 - 5.90 x10*6/uL    Hemoglobin 12.8 (L) 13.5 - 17.5 g/dL    Hematocrit 40.7 (L) 41.0 - 52.0 %    MCV 93 80 - 100 fL    MCH 29.2 26.0 - 34.0 pg    MCHC 31.4 (L) 32.0 - 36.0 g/dL    RDW 13.4 11.5 - 14.5 %    Platelets 223 150 - 450 x10*3/uL    Neutrophils % 74.8 40.0 - 80.0 %    Immature Granulocytes %, Automated 0.5 0.0 - 0.9 %    Lymphocytes % 12.3 13.0 - 44.0 %    Monocytes % 8.0 2.0 - 10.0 %    Eosinophils % 3.3 0.0 - 6.0 %    Basophils % 1.1 0.0 - 2.0 %    Neutrophils Absolute 4.98 1.60 - 5.50 x10*3/uL    Immature Granulocytes Absolute, Automated 0.03 0.00 - 0.50 x10*3/uL    Lymphocytes Absolute 0.82 0.80 - 3.00 x10*3/uL    Monocytes Absolute 0.53 0.05 - 0.80 x10*3/uL    Eosinophils Absolute 0.22 0.00 - 0.40 x10*3/uL    Basophils Absolute 0.07 0.00 - 0.10 x10*3/uL   Comprehensive Metabolic Panel   Result Value Ref Range    Glucose 100 (H) 74 - 99 mg/dL    Sodium 141 136 - 145 mmol/L    Potassium 3.6 3.5 - 5.3 mmol/L    Chloride 103 98 - 107 mmol/L    Bicarbonate 28 21 - 32 mmol/L    Anion Gap 14 10 - 20 mmol/L    Urea Nitrogen 28 (H) 6 - 23 mg/dL    Creatinine 2.43 (H) 0.50 - 1.30 mg/dL    eGFR 26 (L) >60 mL/min/1.73m*2    Calcium 8.6 8.6 - 10.3 mg/dL    Albumin 3.4 3.4 - 5.0 g/dL    Alkaline Phosphatase 207 (H) 33 - 136 U/L    Total Protein 6.6 6.4 - 8.2 g/dL    AST 13 9 - 39 U/L    Bilirubin, Total 1.1 0.0 - 1.2 mg/dL    ALT 8 (L) 10 - 52 U/L   Magnesium   Result Value Ref Range    Magnesium 1.77 1.60 - 2.40 mg/dL   Troponin I, High Sensitivity   Result Value Ref Range    Troponin I, High Sensitivity 15 0 - 20 ng/L   B-type natriuretic peptide   Result Value Ref Range      (H) 0 - 99 pg/mL   D-dimer, quantitative   Result Value Ref Range    D-Dimer Non VTE, Quant (ng/mL FEU) 1,934 (H) <=500 ng/mL FEU     *Note: Due to a large number of results and/or encounters for the requested time period, some results have not been displayed. A complete set of results can be found in Results Review.          Assessment/Plan   Assessment & Plan  Localized swelling, mass and lump, lower limb, bilateral      # BLE pain  # LAURA  # Prostate cancer, declined treatment  # DM  # CHF  # CKD    -Duplex negative for DVT  -concerns for bony metastasis  -will order CT RLE  -IV fluids  -hold nephrotoxic agents  -pain control  -PT/OT  -monitor     DVT ppx  Full code.          Maddy Galindo MD         [1] No family history on file.

## 2025-05-15 NOTE — ED PROVIDER NOTES
HPI   Chief Complaint   Patient presents with    Fall       HPI  Patient is an 85-year-old male brought to the ED today via EMS for right hip pain in the setting of a recent fall.  Patient explains that he has been having worsening right hip pain for about the past week or so.  Today, he was attempting to go from a sitting to standing position when he fell like his right leg gave out, causing him to fall onto his buttocks.  He was unable to get up with the help of his wife, so EMS was contacted.  Patient is a poor historian and is unable to tell me any of his medical history or daily medications.  He does not know whether or not he has a history of atrial fibrillation or if he is on any blood thinners.  He reports having generalized weakness and feeling overall unwell.  Denies any specific chest pain, shortness of breath, abdominal pain, recent fever or cough.      Patient History   Medical History[1]  Surgical History[2]  Family History[3]  Social History[4]    Physical Exam   ED Triage Vitals   Temperature Heart Rate Respirations BP   05/15/25 0759 05/15/25 0800 05/15/25 0800 05/15/25 0759   37 °C (98.6 °F) 79 (!) 28 (!) 173/92      Pulse Ox Temp src Heart Rate Source Patient Position   05/15/25 0759 -- -- --   100 %         BP Location FiO2 (%)     -- --             Physical Exam  Vitals and nursing note reviewed.   Constitutional:       General: He is not in acute distress.     Appearance: He is not toxic-appearing.   HENT:      Head: Normocephalic.      Mouth/Throat:      Mouth: Mucous membranes are moist.   Eyes:      Extraocular Movements: Extraocular movements intact.      Conjunctiva/sclera: Conjunctivae normal.   Cardiovascular:      Rate and Rhythm: Normal rate and regular rhythm.      Pulses: Normal pulses.   Pulmonary:      Effort: Pulmonary effort is normal. No respiratory distress.      Breath sounds: Normal breath sounds. No wheezing.   Abdominal:      General: There is no distension.       Palpations: Abdomen is soft.      Tenderness: There is no abdominal tenderness.   Musculoskeletal:      Cervical back: Neck supple.      Comments: 3+ pitting edema bilateral lower extremities extending up to the knees.  Flexion of the right hip is limited secondary to pain.  Normal capillary refill and 2+ distal pulses present   Skin:     General: Skin is warm.      Capillary Refill: Capillary refill takes less than 2 seconds.   Neurological:      General: No focal deficit present.      Mental Status: He is alert. Mental status is at baseline.           ED Course & MDM   ED Course as of 05/15/25 1301   u May 15, 2025   0828 EKG obtained at 745, interpreted by myself.  Atrial fibrillation with ventricular rate of 81, left axis deviation, otherwise normal intervals with no acute ischemic changes.      Patient is unsure whether or not he has any prior history of atrial fibrillation, however previous EKG from 2-7-2023 also shows an irregularly irregular rhythm [VT]      ED Course User Index  [VT] Domi AGARWAL MD         Diagnoses as of 05/15/25 1301   Fall, initial encounter   Right hip pain   Paget's disease of bone   Elevated d-dimer   LAURA (acute kidney injury)   Atrial fibrillation, unspecified type (Multi)             No data recorded     [place] Coma Scale Score: 15 (05/15/25 0801 : Teresa Pinto RN)                         Medical Decision Making  Patient was seen and evaluated for right hip pain and generalized weakness in the setting of a recent fall.  On arrival, vital signs are unremarkable.  Patient denies any pain at rest.  Additional lab work and imaging ordered for further evaluation of his symptoms.  EKG shows atrial fibrillation.  Patient does not have any known history of atrial fibrillation, and does not appear to be on any anticoagulation.  Therefore, additional workup is initiated    CBC is unremarkable.  CMP shows elevated creatinine of 2.43 with a GFR of 26.  This appears to be worsening from  patient's baseline CKD.  BNP is elevated at 380, though not significantly worse than his baseline.  High-sensitivity troponin is normal at 15.  Magnesium is normal at 1.77.  D-dimer is elevated.  However, given patient's poor kidney function, will likely opt for VQ scan during admission rather than CT angio of the chest.    Vascular US lower extremity venous duplex bilateral   Final Result      XR hip right with pelvis when performed 2 or 3 views   Final Result   1.  No evidence for acute fracture or dislocation.   2.  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   Signed by Lorenzo May MD      CT angio chest for pulmonary embolism    (Results Pending)   Bilateral lower extremity duplex shows no evidence of acute DVT.    Patient was informed of their lab and imaging results, and all questions and concerns were answered.  Given patient's worsening kidney function, new onset A-fib, as well as abnormal hip x-ray, admission planning for further management was discussed at this time, to which the patient was agreeable.  Patient's brother is also now present at the bedside.  He explains that patient does have a history of metastatic prostate cancer, not currently on any treatment.  I discussed the case with Dr. Verduzco, hospitalist, who accepts patient for admission.    Procedure  Procedures       [1]   Past Medical History:  Diagnosis Date    CAD (coronary artery disease)     CHF (congestive heart failure)     EF 40-45%    Chronic indwelling Deal catheter     Chronic renal disease, stage III (Multi)     Diabetes mellitus (Multi)     Eczema     Hiatal hernia     Hypertension     Infarction of spleen     Intracardiac thrombosis, not elsewhere classified     Apical mural thrombus    Old myocardial infarction     Prostate cancer (Multi)     Pulmonary nodules     Renal cyst     bilateral    TIA (transient ischemic attack)    [2]   Past Surgical History:  Procedure Laterality Date    CARDIAC CATHETERIZATION  2013    2 stents     CORONARY ARTERY BYPASS GRAFT      PILONIDAL CYST DRAINAGE  2018    Pilonidal Cyst Resection    PROSTATE SURGERY     [3] No family history on file.  [4]   Social History  Tobacco Use    Smoking status: Former     Current packs/day: 0.00     Types: Cigarettes     Quit date:      Years since quittin.3     Passive exposure: Past    Smokeless tobacco: Never   Vaping Use    Vaping status: Never Used   Substance Use Topics    Alcohol use: Never    Drug use: Never        Domi AGARWAL MD  05/15/25 0704

## 2025-05-16 ENCOUNTER — APPOINTMENT (OUTPATIENT)
Dept: CARDIOLOGY | Facility: HOSPITAL | Age: 85
DRG: 683 | End: 2025-05-16
Payer: MEDICARE

## 2025-05-16 LAB
ANION GAP SERPL CALC-SCNC: 9 MMOL/L (ref 10–20)
AORTIC VALVE MEAN GRADIENT: 3 MMHG
AORTIC VALVE PEAK VELOCITY: 1.05 M/S
ATRIAL RATE: 0 BPM
AV PEAK GRADIENT: 4 MMHG
AVA (PEAK VEL): 2.6 CM2
AVA (VTI): 2.73 CM2
BUN SERPL-MCNC: 27 MG/DL (ref 6–23)
CALCIUM SERPL-MCNC: 8.2 MG/DL (ref 8.6–10.3)
CHLORIDE SERPL-SCNC: 105 MMOL/L (ref 98–107)
CO2 SERPL-SCNC: 29 MMOL/L (ref 21–32)
CREAT SERPL-MCNC: 2.17 MG/DL (ref 0.5–1.3)
EGFRCR SERPLBLD CKD-EPI 2021: 29 ML/MIN/1.73M*2
EJECTION FRACTION APICAL 4 CHAMBER: 50
EJECTION FRACTION: 48 %
ERYTHROCYTE [DISTWIDTH] IN BLOOD BY AUTOMATED COUNT: 13.5 % (ref 11.5–14.5)
GLUCOSE BLD MANUAL STRIP-MCNC: 131 MG/DL (ref 74–99)
GLUCOSE BLD MANUAL STRIP-MCNC: 139 MG/DL (ref 74–99)
GLUCOSE BLD MANUAL STRIP-MCNC: 150 MG/DL (ref 74–99)
GLUCOSE BLD MANUAL STRIP-MCNC: 81 MG/DL (ref 74–99)
GLUCOSE SERPL-MCNC: 76 MG/DL (ref 74–99)
HCT VFR BLD AUTO: 37.7 % (ref 41–52)
HGB BLD-MCNC: 11.8 G/DL (ref 13.5–17.5)
LEFT ATRIUM VOLUME AREA LENGTH INDEX BSA: 33 ML/M2
LEFT VENTRICLE INTERNAL DIMENSION DIASTOLE: 3.8 CM (ref 3.5–6)
LEFT VENTRICULAR OUTFLOW TRACT DIAMETER: 2 CM
MCH RBC QN AUTO: 29.1 PG (ref 26–34)
MCHC RBC AUTO-ENTMCNC: 31.3 G/DL (ref 32–36)
MCV RBC AUTO: 93 FL (ref 80–100)
NRBC BLD-RTO: 0 /100 WBCS (ref 0–0)
PLATELET # BLD AUTO: 231 X10*3/UL (ref 150–450)
POTASSIUM SERPL-SCNC: 3.4 MMOL/L (ref 3.5–5.3)
PR INTERVAL: 210 MS
Q ONSET: 253 MS
QRS COUNT: 14 BEATS
QRS DURATION: 108 MS
QT INTERVAL: 402 MS
QTC CALCULATION(BAZETT): 467 MS
QTC FREDERICIA: 444 MS
R AXIS: -88 DEGREES
RBC # BLD AUTO: 4.05 X10*6/UL (ref 4.5–5.9)
RIGHT VENTRICLE FREE WALL PEAK S': 6.31 CM/S
SODIUM SERPL-SCNC: 140 MMOL/L (ref 136–145)
T AXIS: 60 DEGREES
T OFFSET: 454 MS
TRICUSPID ANNULAR PLANE SYSTOLIC EXCURSION: 1.5 CM
VENTRICULAR RATE: 81 BPM
WBC # BLD AUTO: 6.8 X10*3/UL (ref 4.4–11.3)

## 2025-05-16 PROCEDURE — 2500000001 HC RX 250 WO HCPCS SELF ADMINISTERED DRUGS (ALT 637 FOR MEDICARE OP): Performed by: INTERNAL MEDICINE

## 2025-05-16 PROCEDURE — 99222 1ST HOSP IP/OBS MODERATE 55: CPT | Performed by: PODIATRIST

## 2025-05-16 PROCEDURE — 93306 TTE W/DOPPLER COMPLETE: CPT

## 2025-05-16 PROCEDURE — 93010 ELECTROCARDIOGRAM REPORT: CPT | Performed by: INTERNAL MEDICINE

## 2025-05-16 PROCEDURE — 93306 TTE W/DOPPLER COMPLETE: CPT | Performed by: INTERNAL MEDICINE

## 2025-05-16 PROCEDURE — 80048 BASIC METABOLIC PNL TOTAL CA: CPT | Performed by: INTERNAL MEDICINE

## 2025-05-16 PROCEDURE — 97161 PT EVAL LOW COMPLEX 20 MIN: CPT | Mod: GP

## 2025-05-16 PROCEDURE — 85027 COMPLETE CBC AUTOMATED: CPT | Performed by: INTERNAL MEDICINE

## 2025-05-16 PROCEDURE — 2500000002 HC RX 250 W HCPCS SELF ADMINISTERED DRUGS (ALT 637 FOR MEDICARE OP, ALT 636 FOR OP/ED): Performed by: INTERNAL MEDICINE

## 2025-05-16 PROCEDURE — 2500000004 HC RX 250 GENERAL PHARMACY W/ HCPCS (ALT 636 FOR OP/ED): Performed by: INTERNAL MEDICINE

## 2025-05-16 PROCEDURE — 93005 ELECTROCARDIOGRAM TRACING: CPT

## 2025-05-16 PROCEDURE — 82947 ASSAY GLUCOSE BLOOD QUANT: CPT

## 2025-05-16 PROCEDURE — 1200000002 HC GENERAL ROOM WITH TELEMETRY DAILY

## 2025-05-16 PROCEDURE — 97165 OT EVAL LOW COMPLEX 30 MIN: CPT | Mod: GO

## 2025-05-16 PROCEDURE — 36415 COLL VENOUS BLD VENIPUNCTURE: CPT | Performed by: INTERNAL MEDICINE

## 2025-05-16 RX ORDER — INSULIN LISPRO 100 [IU]/ML
0-5 INJECTION, SOLUTION INTRAVENOUS; SUBCUTANEOUS
Status: DISCONTINUED | OUTPATIENT
Start: 2025-05-16 | End: 2025-05-20 | Stop reason: HOSPADM

## 2025-05-16 RX ORDER — HYDRALAZINE HYDROCHLORIDE 20 MG/ML
5 INJECTION INTRAMUSCULAR; INTRAVENOUS EVERY 6 HOURS PRN
Status: DISCONTINUED | OUTPATIENT
Start: 2025-05-16 | End: 2025-05-20 | Stop reason: HOSPADM

## 2025-05-16 RX ADMIN — INSULIN GLARGINE 20 UNITS: 100 INJECTION, SOLUTION SUBCUTANEOUS at 20:42

## 2025-05-16 RX ADMIN — ASPIRIN 81 MG CHEWABLE TABLET 81 MG: 81 TABLET CHEWABLE at 08:29

## 2025-05-16 RX ADMIN — METOPROLOL TARTRATE 25 MG: 25 TABLET, FILM COATED ORAL at 20:48

## 2025-05-16 RX ADMIN — ATORVASTATIN CALCIUM 80 MG: 80 TABLET, FILM COATED ORAL at 08:28

## 2025-05-16 RX ADMIN — HEPARIN SODIUM 5000 UNITS: 5000 INJECTION, SOLUTION INTRAVENOUS; SUBCUTANEOUS at 20:48

## 2025-05-16 RX ADMIN — ISOSORBIDE MONONITRATE 30 MG: 30 TABLET, EXTENDED RELEASE ORAL at 08:28

## 2025-05-16 RX ADMIN — HEPARIN SODIUM 5000 UNITS: 5000 INJECTION, SOLUTION INTRAVENOUS; SUBCUTANEOUS at 06:26

## 2025-05-16 RX ADMIN — HEPARIN SODIUM 5000 UNITS: 5000 INJECTION, SOLUTION INTRAVENOUS; SUBCUTANEOUS at 12:38

## 2025-05-16 RX ADMIN — METOPROLOL TARTRATE 25 MG: 25 TABLET, FILM COATED ORAL at 08:29

## 2025-05-16 ASSESSMENT — COGNITIVE AND FUNCTIONAL STATUS - GENERAL
DRESSING REGULAR UPPER BODY CLOTHING: A LITTLE
STANDING UP FROM CHAIR USING ARMS: A LITTLE
HELP NEEDED FOR BATHING: A LOT
DRESSING REGULAR LOWER BODY CLOTHING: A LOT
STANDING UP FROM CHAIR USING ARMS: A LITTLE
CLIMB 3 TO 5 STEPS WITH RAILING: TOTAL
MOVING TO AND FROM BED TO CHAIR: A LITTLE
MOVING FROM LYING ON BACK TO SITTING ON SIDE OF FLAT BED WITH BEDRAILS: A LITTLE
MOVING FROM LYING ON BACK TO SITTING ON SIDE OF FLAT BED WITH BEDRAILS: A LITTLE
CLIMB 3 TO 5 STEPS WITH RAILING: A LITTLE
MOVING TO AND FROM BED TO CHAIR: A LITTLE
WALKING IN HOSPITAL ROOM: A LITTLE
MOBILITY SCORE: 18
TURNING FROM BACK TO SIDE WHILE IN FLAT BAD: A LITTLE
DRESSING REGULAR LOWER BODY CLOTHING: A LOT
HELP NEEDED FOR BATHING: A LITTLE
DRESSING REGULAR UPPER BODY CLOTHING: A LITTLE
TOILETING: A LITTLE
WALKING IN HOSPITAL ROOM: A LITTLE
MOBILITY SCORE: 16
TOILETING: A LOT
TURNING FROM BACK TO SIDE WHILE IN FLAT BAD: A LITTLE
DAILY ACTIVITIY SCORE: 19
DAILY ACTIVITIY SCORE: 17

## 2025-05-16 ASSESSMENT — PAIN SCALES - GENERAL
PAINLEVEL_OUTOF10: 0 - NO PAIN
PAINLEVEL_OUTOF10: 0 - NO PAIN

## 2025-05-16 ASSESSMENT — PAIN - FUNCTIONAL ASSESSMENT: PAIN_FUNCTIONAL_ASSESSMENT: 0-10

## 2025-05-16 ASSESSMENT — ACTIVITIES OF DAILY LIVING (ADL): BATHING_ASSISTANCE: MODERATE

## 2025-05-16 NOTE — PROGRESS NOTES
Physical Therapy    Physical Therapy Evaluation    Patient Name: Dennis Galo  MRN: [id number]  Today's Date: 5/16/2025   Time Calculation  Start Time: 0952  Stop Time: 1007  Time Calculation (min): 15 min  605/605-A    Assessment/Plan   PT Assessment  PT Assessment Results: Decreased strength, Decreased endurance, Impaired balance, Decreased mobility  Rehab Prognosis: Good  Barriers to Discharge Home: Physical needs  Physical Needs: Ambulating household distances limited by function/safety  End of Session Communication: Bedside nurse  Assessment Comment: Pt demonstrates impaired mobility throughout the session requiring increased level of assistance. Pt not at baseline and will benefit from further acute PT services and therapy s/p discharge to regain function and independence.  End of Session Patient Position: Alarm on, Up in chair (all needs in reach and no complaints noted)  IP OR SWING BED PT PLAN  Inpatient or Swing Bed: Inpatient  PT Plan  Treatment/Interventions: Bed mobility, Transfer training, Gait training  PT Plan: Ongoing PT  PT Frequency: 4 times per week  PT Discharge Recommendations: Moderate intensity level of continued care, Low intensity level of continued care (depending on progress)  PT Recommended Transfer Status: Assist x1  PT - OK to Discharge: Yes    Subjective     Current Problem:  1. Fall, initial encounter        2. Localized swelling, mass and lump, lower limb, bilateral  Vascular US lower extremity venous duplex bilateral      3. Right hip pain        4. Paget's disease of bone        5. Elevated d-dimer        6. LAURA (acute kidney injury)        7. Atrial fibrillation, unspecified type (Multi)          Problem List[1]    General Visit Information:  General  Reason for Referral: PT Eval and Treat  Referred By: Maddy Galindo MD  Past Medical History Relevant to Rehab: 85 y.o. male w/ PMH of diabetes, CHF, CKD, prostate cancer presented to the hospital with chief complaint of leg  "pain.   Patient reported his pain started couple days ago and he was sitting on walker and was attempting to stand up and his leg gave out.  He denies any other symptoms like fever, chills.  He reported he is ambulatory at baseline.  Family/Caregiver Present: No  Co-Treatment: OT  Co-Treatment Reason: maximize safety and functional mobility  Prior to Session Communication: Bedside nurse  Patient Position Received: Bed, 2 rail up, Alarm on  General Comment: Pt agreeable to PT.    Home Living:  Home Living  Type of Home: House  Lives With: Spouse  Home Adaptive Equipment: Walker rolling or standard, Wheelchair-manual, Cane  Home Layout: One level, Laundry in basement  Home Access:  (1+1 JÚNIOR)  Bathroom Shower/Tub: Tub/shower unit  Bathroom Toilet: Handicapped height  Bathroom Equipment: Grab bars in shower, Shower chair with back, Hand-held shower hose    Prior Level of Function:  Prior Function Per Pt/Caregiver Report  Level of Becker: Independent with homemaking with ambulation, Independent with ADLs and functional transfers (Pt amb mainly with RW, however uses WC for longer distances, wife assists with ADLs, wife does IADLs, family assists with driving and grocery shopping)    Precautions:  Precautions  Medical Precautions: Fall precautions  Precautions Comment: leg bag catheter    Vital Signs:  Vital Signs  Vital Signs Comment: VSS    Objective     Pain:  Pain Assessment  Pain Assessment:  (R hip 2/10 at rest, 8/10 with mobility, repositioned for comfort s/p session)    Cognition:  Cognition  Overall Cognitive Status:  (A&Ox3, demos some decreased processing/decreased insight into impairments/medical history (pt reports to MD at bedside he is \"not sure\" if he is receiving treatment for his CA))    General Assessments:  Sensation  Light Touch: No apparent deficits  Strength  Strength Comments: B LE ROM WFL, B LE strength 4/5  Static Sitting Balance  Static Sitting-Level of Assistance: Close supervision  Static " Standing Balance  Static Standing-Level of Assistance: Close supervision  Dynamic Standing Balance  Dynamic Standing-Level of Assistance: Close supervision    Functional Assessments:  Bed Mobility  Bed Mobility: Yes  Bed Mobility 1  Bed Mobility Comments 1: supine to sitting: min A x 1 for R LE advancement  Transfers  Transfer: Yes  Transfer 1  Trials/Comments 1: STS from EOB: SBA  Ambulation/Gait Training  Ambulation/Gait Training Performed: Yes  Ambulation/Gait Training 1  Comments/Distance (ft) 1: Pt was able to amb 5' from EOB to bedside chair using RW with SBA. Amb distance limited 2/2 pt's MD coming in and needing to see pt.     Outcome Measures:  [facility] Basic Mobility  Turning from your back to your side while in a flat bed without using bedrails: A little  Moving from lying on your back to sitting on the side of a flat bed without using bedrails: A little  Moving to and from bed to chair (including a wheelchair): A little  Standing up from a chair using your arms (e.g. wheelchair or bedside chair): A little  To walk in hospital room: A little  Climbing 3-5 steps with railing: Total  Basic Mobility - Total Score: 16    Goals:  Encounter Problems       Encounter Problems (Active)       PT Problem       STG - Pt will transition supine <> sitting with SUP  (Progressing)       Start:  05/16/25    Expected End:  05/30/25            STG - Pt will transfer STS with SUP  (Progressing)       Start:  05/16/25    Expected End:  05/30/25            STG - Pt will amb 40' using RW with SBA  (Progressing)       Start:  05/16/25    Expected End:  05/30/25                 Education Documentation  Precautions, taught by Xin Alberto PT at 5/16/2025 12:37 PM.  Learner: Patient  Readiness: Acceptance  Method: Explanation  Response: Verbalizes Understanding  Comment: PT POC    Mobility Training, taught by Xin Alberto PT at 5/16/2025 12:37 PM.  Learner: Patient  Readiness: Acceptance  Method: Explanation  Response: Verbalizes  Understanding  Comment: PT POC    Education Comments  No comments found.               [1]   Patient Active Problem List  Diagnosis    Atonic bladder    Balanitis    Bilateral edema of lower extremity    BPH (benign prostatic hyperplasia)    CAD (coronary artery disease)    Chronic low back pain    CKD (chronic kidney disease), stage III (Multi)    Diabetic nephropathy (Multi)    Dribbling of urine    Gait instability    Gout    Hypertension associated with diabetes    Neurogenic bladder    Neuropathic pain    Primary osteoarthritis of right hip    Prostate cancer (Multi)    S/P CABG (coronary artery bypass graft)    Severe obesity (BMI 35.0-39.9) with comorbidity (Multi)    Systolic heart failure    Urge incontinence of urine    Urinary retention    Venous stasis dermatitis of both lower extremities    Wound of skin    Overflow incontinence of urine    Type 2 diabetes mellitus with chronic kidney disease, with long-term current use of insulin (Multi)    Type 2 diabetes mellitus with diabetic peripheral angiopathy without gangrene, with long-term current use of insulin (Multi)    Localized swelling, mass and lump, lower limb, bilateral

## 2025-05-16 NOTE — PROGRESS NOTES
Dennis Galo is a 85 y.o. male on day 1 of admission presenting with Localized swelling, mass and lump, lower limb, bilateral.      Subjective   Pt's hip pain improved, was able to walk with therapy       Objective     Last Recorded Vitals  /90   Pulse 86   Temp 36.9 °C (98.4 °F)   Resp 15   Wt 104 kg (230 lb)   SpO2 93%     Physical Exam  G: aox3, NAD, cooperative  HENT: neck supple, no JVD  Eyes: clear sclera  CV: irregularly irregular, rate controlled s1 s2  L: clear  Abd: soft, NT, non distended  Ext: no c/c/e  N: no appreciable acute focal deficits  Psych: appropriate mood and behavior     Assessment & Plan      R hip pain secondary to mechanical fall and metastatic prostate CA with bony mets  Chronic venous insufficiency/lymphedema with L LE bulla  LAURA on CKD III  PAF    CAD s/p PCI  HTN, HLD  CHFmrEF   DM II  Prostate CA with SBT  DVT ppx  Full code    Plan:  - Discussed CT findings with patient, he is quite a poor historian, discussed with his wife who also is not the greatest historian, he no longer follows with heme/onc, findings concerning for metastatic prostate CA, pain control, will refer back to heme/onc Dr. Allen at discharge who he used to follow with, PT/OT evals  - Follows with nephrology as outpatient, his nephrologist consulted, diuretics currently on hold, follow up AM Cr  - Appears in new Afib, will ask cardiology to see him, used to follow with Dr Kaur in the past, repeat ECHO ordered given new afib and LE edema (though this is chronic), both patient and wife unable to clarify if there was a history of Afib, from chart review this appears new, will hold on anticoagulation for now   - Podiatry consulted with regards to LE care, LE wound care, compression as tolerated        Rico Verduzco DO

## 2025-05-16 NOTE — PROGRESS NOTES
05/16/25 1722   Discharge Planning   Living Arrangements Spouse/significant other   Support Systems Spouse/significant other;Family members   Type of Residence Private residence   Number of Stairs to Enter Residence 2   Number of Stairs Within Residence 10   Expected Discharge Disposition SNF   Does the patient need discharge transport arranged? Yes   RoundTrip coordination needed? Yes     I met with patient at the bedside, verified insurance and demographics.  Patient lives with his wife.  He ambulates with a cane or walker, drives and is independent with ADLs.  Patient slipped off of his couch, fell to the floor 5/14 due to sharp pain in his groin.  Patient was active with [facility] prior to arrival for suprapubic catheter changes.  Patient is unsure if he will require SNF or resume C when he's discharged.  AMPAC score is 16/17.  Care Coordination team following for assistance with discharge planning.  Kumar RAPHAEL TCC

## 2025-05-16 NOTE — CONSULTS
Inpatient consult to Podiatry  Consult performed by: Therese Mcleod DPM  Consult ordered by: Rico Verduzco DO  Reason for consult: Leg blister          Reason For Consult  Wound care    History Of Present Illness  Dennis Galo is a 85 y.o. male presenting with past medical history of diabetes, CHF, CKD, prostate cancer presented to the hospital with chief complaint of leg pain.   Patient reported his neck pain started couple days ago and he was sitting on walker and was attempting to stand up and his leg gave out.  He denies any other symptoms like fever, chills.  He reported he is ambulatory at baseline.    Podiatry is consulted for care of the blister.  Patient stated that he developed a blister on the anterior aspect of the left lower extremity.  Him and his wife drained it with a needle.  Denies using any type of compression stockings in the past.  Does have a history of swelling of the lower legs.   .     Past Medical History  He has a past medical history of CAD (coronary artery disease), CHF (congestive heart failure), Chronic indwelling Deal catheter, Chronic renal disease, stage III (Multi), Diabetes mellitus (Multi), Eczema, Hiatal hernia, Hypertension, Infarction of spleen, Intracardiac thrombosis, not elsewhere classified, Old myocardial infarction, Prostate cancer (Multi), Pulmonary nodules, Renal cyst, and TIA (transient ischemic attack).    Surgical History  He has a past surgical history that includes Pilonidal cyst drainage (07/26/2018); Cardiac catheterization (2013); Prostate surgery; and Coronary artery bypass graft.     Social History  He reports that he quit smoking about 17 years ago. His smoking use included cigarettes. He has been exposed to tobacco smoke. He has never used smokeless tobacco. He reports that he does not drink alcohol and does not use drugs.    Family History  Family History[1]     Allergies  Patient has no known allergies.    Review of Systems  Negative except for  "what is stated in the HPI  Physical Exam  Patient alert, oriented, no acute distress    VASC: Nonpalpable pedal pulses B/L.  CFT brisk all digits.  Skin temperature is warm to warm proximal to distal B/L.  (-)hair growth B/L.   +1 pitting LE edema B/L and multiple varicosities.  No calor noted.    NEURO: Light touch intact B/L.   No pain on palpation of the leg blister.    DERM: Erythema noted bilateral lower extremity consistent with venous stasis dermatitis.  Serous filled bulla noted on the anterior aspect of the left lower leg which is draining slowly.  There is no calor no crepitus no purulence no necrosis noted.  There is no pain on palpation.  No generalized weeping noted.  Small dry scab noted on the anterior aspect of the right lower extremity.     MUSCULOSKEL: Gross motor is intact bilateral lower extremity.                Last Recorded Vitals  Blood pressure 164/90, pulse 86, temperature 36.9 °C (98.4 °F), resp. rate 15, height 1.778 m (5' 10\"), weight 104 kg (230 lb), SpO2 93%.    Relevant Results   WBC=6.8     Vascular Lab Report  VASC US LOWER EXTREMITY VENOUS DUPLEX BILATERAL        Patient Name:      BEVERLY KO JENNY   Reading Physician:  [id number] Danette Leahy MD  Study Date:        5/15/2025           Ordering Physician: [id number] HEDRICK  MRN/PID:           [id number]            Technologist:       Lucía Ledezma ASIYA  Accession#:        [id number]        Technologist 2:  Date of Birth/Age: 1940 / 85 years Encounter#:         [id number]  Gender:            M  Admission Status:  Emergency           Location Performed: [facility]        Diagnosis/ICD: Localized swelling, bilateral lower extremities-R22.43  CPT Codes:     62044 Peripheral venous duplex scan for DVT complete        CONCLUSIONS:  Right Lower Venous: No evidence of acute deep vein thrombus visualized in the right lower extremity.  Left Lower Venous: No evidence of acute deep vein thrombus visualized in the left lower extremity. " Cannot rule out thrombus in non-visualized posterior tibial and peroneal veins due to edema.     Additional Findings:  Pulsatile flow throughout bilateral venous system suggesting volume overload or  elevated cardiac filling pressures.        Imaging & Doppler Findings:     Right                 Compressible Thrombus   Flow  Distal External Iliac     Yes        None   Pulsatile  CFV                       Yes        None   Pulsatile  PFV                       Yes        None  FV Proximal               Yes        None   Pulsatile  FV Mid                    Yes        None  FV Distal                 Yes        None  Popliteal                 Yes        None   Pulsatile  Peroneal                  Yes        None  PTV                       Yes        None        Left                  Compress Thrombus   Flow  Distal External Iliac   Yes      None   Pulsatile  CFV                     Yes      None   Pulsatile  PFV                     Yes      None  FV Proximal             Yes      None   Pulsatile  FV Mid                  Yes      None  FV Distal               Yes      None  Popliteal               Yes      None   Pulsatile        [id number] Danette Leahy MD  Electronically signed by [id number] Danette Leahy MD on 5/15/2025 at 11:18:36 AM           ** Final **     Assessment/Plan     # Chronic venous insufficiency  # Lower extremity edema  # Bulla left lower extremity    -Patient fully examined, charts ,labs and imaging are reviewed  - No signs of cellulitis of the lower extremity  - Patient would benefit from compression therapy  - Local wound care of the left lower extremity is ABD, Kerlix and Ace compression  - Ace compression was applied to right lower extremity  - Podiatry will follow    Therese Mcleod DPM           [1] No family history on file.

## 2025-05-16 NOTE — CONSULTS
NEPHROLOGY CONSULTATION NOTE    DATE OF CONSULTATION:  05/16/25     Referring Physician: Rico Verduzco DO    REASON FOR CONSULT: LAURA on CKD stage III    HISTORY OF PRESENT ILLNESS:   Dennis Galo is a 85 y.o. male presenting with right hip/leg pain.  He has chronic lower extremity swelling for which he takes torsemide 20 mg 3 tablets daily.  He has maintained his weight around 220 pounds.  He has a chronic indwelling Deal catheter and does urinate quite a bit.  He does not weigh his weight every day.  He has been using the lymphedema pump off-and-on for the lower extremity swelling.  He came in because his right leg gave out and he had a hard time walking.  X-ray of the hip did not reveal any fracture he had lower extremity ultrasound to rule out DVT which was negative.  He has been getting Ace wrap to the foot for ulcerations.  His creatinine on admission was noted to be around 2.43 and thereby nephrology consultation was requested.    Back in October 2024 his creatinine was 1.64.  He had an episode of LAURA back in December 2023.  Following that he did see Dr. Garland in January 2024 but he never followed up.  For the most part his baseline creatinine is between 1.5-1.8.  His echocardiogram shows mildly decreased EF of 45 to 50% there is normal right ventricular global systolic function but severely increased septal and severely increased posterior left ventricular wall thickness.    His renal ultrasound showed bilateral renal cyst.  He has chronic indwelling Deal and has large amount of blood    PAST MEDICAL HISTORY:   Medical History[1]       SURGICAL HISTORY:   Surgical History[2]        SOCIAL HISTORY:   He reports that he quit smoking about 17 years ago. His smoking use included cigarettes. He has been exposed to tobacco smoke. He has never used smokeless tobacco. He reports that he does not drink alcohol and does not use drugs.  He is .  He lives with his wife.  He quit smoking 25 years ago.  He  does not drink alcohol anymore.  He has no children.  He worked as a  for [employer].    FAMILY HISTORY:  Denies any family members with kidney disease    ALLERGIES:  Patient has no known allergies.    MEDICATIONS:     Medications Ordered Prior to Encounter[3]     Scheduled medications  Scheduled Medications[4]  Continuous medications  Continuous Medications[5]  PRN medications  PRN Medications[6]     REVIEW OF SYSTEMS:    No headache, no blurry vision, no fever or chills,  no chest pain, no shortness of breath, no nausea, no vomiting, no diarrhea, no constipation , no focal weakness, has indwelling Deal, no burning urination, has chronic leg swelling. Rest of the 10 point ROS is negative     PHYSICAL EXAM:    Visit Vitals  /90   Pulse 86   Temp 36.9 °C (98.4 °F)   Resp 15        GENERAL: Awake and Alert, in no distress, Cooperative  EYES: EOMI,  no Pallor noted  HEENT: oral mucosa moist  NECK: supple  CHEST: no tachypnea, no crackles, no wheeze  CVS: S1, S2 heard, Regular Rate and Rhythm, no murmurs, no rubs  ABDOMEN: soft, non tender, bowel sounds present, no distention  MSK: No joint effusion, no tenderness  NEURO: No focal deficit, moving all extremities, no tremor  EXT: Chronic nonpitting leg edema  PSYCH: normal affect         I&O 24HR    Intake/Output Summary (Last 24 hours) at 5/16/2025 1613  Last data filed at 5/16/2025 1431  Gross per 24 hour   Intake 400 ml   Output 750 ml   Net -350 ml       RESULTS:         Lab Results   Component Value Date    WBC 6.8 05/16/2025    HGB 11.8 (L) 05/16/2025    HCT 37.7 (L) 05/16/2025    MCV 93 05/16/2025     05/16/2025      Lab Results   Component Value Date    GLUCOSE 76 05/16/2025    CALCIUM 8.2 (L) 05/16/2025     05/16/2025    K 3.4 (L) 05/16/2025    CO2 29 05/16/2025     05/16/2025    BUN 27 (H) 05/16/2025    CREATININE 2.17 (H) 05/16/2025         === 05/15/25 ===    XR HIP RIGHT WITH PELVIS WHEN PERFORMED 2 OR 3  VIEWS    - Impression -  1.  No evidence for acute fracture or dislocation.  2.  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  Signed by Lorenzo May MD     ASSESSMENT/ PLAN:     This is a 85 y.o. year old male who presents with right hip pain    1.  LAURA: Patient's initial creatinine was 2.43.  He was taking diuretic.  His torsemide has been held.  Creatinine today is at 2.17.  Will continue to hold off on the torsemide for the time being.  Continue local measures.  He does have heart failure with low EF of 45% but currently not in decompensation.  I did tell him to weigh himself daily and to adjust the diuretic dose based on the leg swelling and weight.    2.  CKD stage III: Bilateral renal cyst on previous renal imaging.  Has chronic indwelling Deal.  Has a creatinine between 1.5-1.8 at baseline.  Has been on chronic diuretic treatment.    3.  Hematuria: Patient has metastatic prostate cancer and has chronic indwelling Deal for urinary retention.  He is on heparin but not on any other blood thinner.    4.  Peripheral edema: Patient has chronic lymphedema.  He is on diuretic currently 60 mg a day but due to the LAURA we are holding it.  Local measures.  He does have an EF of 45%.      Thank you very much for allowing me to participate in the care of this patient.     This note was created using dragon dictation and may contain unintended errors    VITALIY WOODARD MD    05/16/25         [1]   Past Medical History:  Diagnosis Date    CAD (coronary artery disease)     CHF (congestive heart failure)     EF 40-45%    Chronic indwelling Deal catheter     Chronic renal disease, stage III (Multi)     Diabetes mellitus (Multi)     Eczema     Hiatal hernia     Hypertension     Infarction of spleen     Intracardiac thrombosis, not elsewhere classified     Apical mural thrombus    Old myocardial infarction     Prostate cancer (Multi)     Pulmonary nodules     Renal cyst     bilateral    TIA (transient ischemic attack)    [2]  "  Past Surgical History:  Procedure Laterality Date    CARDIAC CATHETERIZATION  2013    2 stents    CORONARY ARTERY BYPASS GRAFT      PILONIDAL CYST DRAINAGE  07/26/2018    Pilonidal Cyst Resection    PROSTATE SURGERY     [3]   No current facility-administered medications on file prior to encounter.     Current Outpatient Medications on File Prior to Encounter   Medication Sig Dispense Refill    ammonium lactate (Amlactin) 12 % cream Apply 1 Application topically if needed for dry skin.      aspirin 81 mg chewable tablet Chew 1 tablet (81 mg) once daily.      atorvastatin (Lipitor) 80 mg tablet Take 1 tablet (80 mg) by mouth once daily. (Patient taking differently: Take 1 tablet (80 mg) by mouth once daily at bedtime.) 90 tablet 3    blood sugar diagnostic (OneTouch Ultra Test) strip USE ONCE DAILY AS DIRECTED 100 strip 1    FreeStyle glucose monitoring (OneTouch Ultra2 Meter) kit As directed 1 each 0    isosorbide mononitrate ER (Imdur) 30 mg 24 hr tablet Take 1 tablet (30 mg) by mouth once daily. 90 tablet 3    lancets (OneTouch UltraSoft Lancets) misc Use once daily 100 each 1    metoprolol tartrate (Lopressor) 25 mg tablet TAKE 1 TABLET BY MOUTH TWICE A DAY (Patient taking differently: Take 1 tablet (25 mg) by mouth 2 times a day.) 180 tablet 1    nystatin (Mycostatin) 100,000 unit/gram powder Apply 1 Application topically 2 times a day. 60 g 0    pen needle, diabetic (BD Arelis 2nd Gen Pen Needle) 32 gauge x 5/32\" needle USE ONCE DAILY 100 each 2    torsemide (Demadex) 20 mg tablet TAKE 3 TABLETS (60 MG) BY MOUTH ONCE DAILY. 270 tablet 1    [DISCONTINUED] dulaglutide 3 mg/0.5 mL pen injector Inject 3 mg under the skin 1 (one) time per week. (Patient taking differently: Inject 3 mg under the skin 1 (one) time per week. Every Sunday) 6 mL 1    [DISCONTINUED] insulin glargine (Lantus Solostar U-100 Insulin) 100 unit/mL (3 mL) pen INJECT 24 UNITS UNDER THE SKIN ONCE DAILY IN THE MORNING. 15 mL 1    gabapentin " (Neurontin) 100 mg capsule Take 1 capsule (100 mg) by mouth 3 times a day. (Patient not taking: Reported on 5/15/2025)      insulin glargine (Lantus Solostar U-100 Insulin) 100 unit/mL (3 mL) pen INJECT 24 UNITS UNDER THE SKIN ONCE DAILY IN THE MORNING. 15 mL 1    leuprolide acetate (LEUPROLIDE, BULK, MISC) Inject 5 mg as directed every 6 months. Lupron 5 MG- by injection into muscle every 6 months  (Patient not taking: Reported on 5/15/2025)      SITagliptin phosphate (Januvia) 100 mg tablet Take 1 tablet (100 mg) by mouth once daily. (Patient not taking: Reported on 5/15/2025)     [4] aspirin, 81 mg, oral, Daily  atorvastatin, 80 mg, oral, Daily  heparin (porcine), 5,000 Units, subcutaneous, q8h  insulin glargine, 20 Units, subcutaneous, Nightly  insulin lispro, 0-5 Units, subcutaneous, TID AC  isosorbide mononitrate ER, 30 mg, oral, Daily  metoprolol tartrate, 25 mg, oral, BID  [5]    [6] PRN medications: dextrose, dextrose, glucagon, glucagon, morphine, oxyCODONE-acetaminophen

## 2025-05-16 NOTE — CONSULTS
Dietitian consult ordered per nursing screen for stage 3 or 4 pressure injuries or multiple non healing wounds. Per review of chart, reddened buttocks, no other wounds noted.   Full nutrition assessment not indicated for this type of wound.    Pt to be re-screened for LOS day 10 if still admitted. Please re-consult RD for changes in nutrition status or need for RD sooner than anticipated re-screening.

## 2025-05-16 NOTE — PROGRESS NOTES
Occupational Therapy    Evaluation    Patient Name: Dennis Galo  MRN: [id number]  Today's Date: 5/16/2025  Time Calculation  Start Time: 0951  Stop Time: 1007  Time Calculation (min): 16 min    Assessment  IP OT Assessment  OT Assessment: Pt is an 86 y/o male presenting s/p fall at home w/ leg pain. Pt is presenting with increased pain, decreased balance, and decreased endurance impacting independence w/ ADLs and mobility. Pt would benefit from continued OT while in hospital to address.  Barriers to Discharge Home: Caregiver assistance, Physical needs  Physical Needs: Intermittent ADL assistance needed, Intermittent mobility assistance needed  End of Session Communication: Bedside nurse  End of Session Patient Position: Up in chair, Alarm on (MD remained at bedside upon PT/OT exit)    Plan:  Treatment Interventions: ADL retraining, Functional transfer training, Endurance training, Patient/family training  OT Frequency: 4 times per week  OT Discharge Recommendations: Low intensity level of continued care, Moderate intensity level of continued care  OT - OK to Discharge: Yes (from OT perspective)    Subjective     Current Problem:  Problem List[1]    General:  General  Reason for Referral: OT eval and tx  Referred By: Maddy Galindo MD  Past Medical History Relevant to Rehab: 85 y.o. male w/ PMH of diabetes, CHF, CKD, prostate cancer presented to the hospital with chief complaint of leg pain.   Patient reported his pain started couple days ago and he was sitting on walker and was attempting to stand up and his leg gave out.  He denies any other symptoms like fever, chills.  He reported he is ambulatory at baseline.  Family/Caregiver Present: No  Co-Treatment:  (co-eval with PT for skilled assist with progression of functional mobility and safety)  Prior to Session Communication: Bedside nurse (reports pt stable to participate)  Patient Position Received: Bed, 2 rail up, Alarm on  General Comment: Pt is  "pleasant and cooperative, agreeable to session    Precautions:  Precautions Comment: falls, full code, activity orders: ambulate; heart tele, IV hep locked, leg bag catheter    Vital Signs:  Vital Signs Comment: VSS    Pain:  Pain Assessment  Pain Assessment:  (2/10 at rest, 8/10 with mobility in RLE; provided positioning and rest to pt satisfaction, notified RN)    Objective     Cognition:  Overall Cognitive Status:  (A&Ox3, demos some decreased processing/decreased insight into impairments/medical history (pt reports to MD at bedside he is \"not sure\" if he is receiving treatment for his CA))             Home Living:  Type of Home:  (Lives with wife in a 1 story home 1+1 J[address]NI[address], no rails. Bed/bath on 1, laundry in basement. Regular bed. Reports his wife was recently sick w/ PNA and lost excessive weight, weighs 75 pounds,  unable to physically assist. 2 brothers and nephew assist PRN)  Bathroom Accessibility: tub/shower with shower chair, HHSH, grab bars. Elevated toilets, pulls up on sink to stand.     Prior Function:  Level of [place]:  (Mostly Independent with ADLs (occ A from wife for pants), splits IADLs with wife (wife performing laundry in basement), ambulates with RW in home & uses w/c for longer distances, +drives but has not recently d/t vision changes. No other falls)  Prior Function Comments: also has cane at home    IADL History:       ADL:  Eating Assistance: Independent  Grooming Assistance: Independent  Bathing Assistance: Moderate (simulated)  UE Dressing Assistance: Stand by (managing gown to front)  LE Dressing Assistance: Moderate (donned pull-on brief, assist to thread over BLE)  Toileting Assistance with Device: Moderate (+leg bag catheter, anticipate mod A for clothing mgmt and hygiene s/p BM)      Bed Mobility/Transfers:   Bed Mobility  Bed Mobility:  (Min A at RLE for supine > sit EOB)  Transfers  Transfer:  (SBA transfer from bed to chair with RW; further mobility deferred as MD entered " room upon transfer and remained to speak with pt for extended period)        Vision: Vision - Basic Assessment  Current Vision:  (wears glasses, reports blurred vision however states he saw eye dr and got updated Rx a couple weeks ago)   and      Sensation:  Light Touch: No apparent deficits    Strength:  Strength Comments: not formally assessed however BUE appear WFL for ADL        Outcome Measures: [facility] Daily Activity  Putting on and taking off regular lower body clothing: A lot  Bathing (including washing, rinsing, drying): A lot  Putting on and taking off regular upper body clothing: A little  Toileting, which includes using toilet, bedpan or urinal: A lot  Taking care of personal grooming such as brushing teeth: None  Eating Meals: None  Daily Activity - Total Score: 17                    EDUCATION:  Education  Risk and Benefits Discussed with Patient/Caregiver/Other: yes  Patient/Caregiver Demonstrated Understanding: yes  Education Documentation  ADL Training, taught by Loulou Sanders OT at 5/16/2025 12:18 PM.  Learner: Patient  Readiness: Acceptance  Method: Explanation  Response: Verbalizes Understanding    Education Comments  No comments found.        Goals:   Encounter Problems       Encounter Problems (Active)       OT Goals       Increase LE bathing to Mod I with adaptive equipment prn  (Progressing)       Start:  05/16/25    Expected End:  05/30/25            Increase LE dressing to Mod I with adaptive equipment as needed.  (Progressing)       Start:  05/16/25    Expected End:  05/30/25            Increase toileting to Mod I with adaptive equipment as needed  (Progressing)       Start:  05/16/25    Expected End:  05/30/25            Increase functional transfers to/from bed, chair & commode to Mod I with DME for safety  (Progressing)       Start:  05/16/25    Expected End:  05/30/25                           [1]   Patient Active Problem List  Diagnosis    Atonic bladder    Balanitis    Bilateral  edema of lower extremity    BPH (benign prostatic hyperplasia)    CAD (coronary artery disease)    Chronic low back pain    CKD (chronic kidney disease), stage III (Multi)    Diabetic nephropathy (Multi)    Dribbling of urine    Gait instability    Gout    Hypertension associated with diabetes    Neurogenic bladder    Neuropathic pain    Primary osteoarthritis of right hip    Prostate cancer (Multi)    S/P CABG (coronary artery bypass graft)    Severe obesity (BMI 35.0-39.9) with comorbidity (Multi)    Systolic heart failure    Urge incontinence of urine    Urinary retention    Venous stasis dermatitis of both lower extremities    Wound of skin    Overflow incontinence of urine    Type 2 diabetes mellitus with chronic kidney disease, with long-term current use of insulin (Multi)    Type 2 diabetes mellitus with diabetic peripheral angiopathy without gangrene, with long-term current use of insulin (Multi)    Localized swelling, mass and lump, lower limb, bilateral

## 2025-05-17 LAB
ABO GROUP (TYPE) IN BLOOD: NORMAL
ALBUMIN SERPL BCP-MCNC: 3.1 G/DL (ref 3.4–5)
ANION GAP SERPL CALC-SCNC: 14 MMOL/L (ref 10–20)
ANTIBODY SCREEN: NORMAL
APTT PPP: 34 SECONDS (ref 26–36)
BUN SERPL-MCNC: 24 MG/DL (ref 6–23)
CALCIUM SERPL-MCNC: 8.3 MG/DL (ref 8.6–10.3)
CHLORIDE SERPL-SCNC: 104 MMOL/L (ref 98–107)
CO2 SERPL-SCNC: 27 MMOL/L (ref 21–32)
CREAT SERPL-MCNC: 1.95 MG/DL (ref 0.5–1.3)
EGFRCR SERPLBLD CKD-EPI 2021: 34 ML/MIN/1.73M*2
ERYTHROCYTE [DISTWIDTH] IN BLOOD BY AUTOMATED COUNT: 13.4 % (ref 11.5–14.5)
GLUCOSE BLD MANUAL STRIP-MCNC: 120 MG/DL (ref 74–99)
GLUCOSE BLD MANUAL STRIP-MCNC: 170 MG/DL (ref 74–99)
GLUCOSE BLD MANUAL STRIP-MCNC: 183 MG/DL (ref 74–99)
GLUCOSE BLD MANUAL STRIP-MCNC: 71 MG/DL (ref 74–99)
GLUCOSE SERPL-MCNC: 69 MG/DL (ref 74–99)
HCT VFR BLD AUTO: 39.7 % (ref 41–52)
HGB BLD-MCNC: 12.5 G/DL (ref 13.5–17.5)
MAGNESIUM SERPL-MCNC: 1.81 MG/DL (ref 1.6–2.4)
MCH RBC QN AUTO: 29.3 PG (ref 26–34)
MCHC RBC AUTO-ENTMCNC: 31.5 G/DL (ref 32–36)
MCV RBC AUTO: 93 FL (ref 80–100)
NRBC BLD-RTO: 0 /100 WBCS (ref 0–0)
PHOSPHATE SERPL-MCNC: 2.6 MG/DL (ref 2.5–4.9)
PLATELET # BLD AUTO: 231 X10*3/UL (ref 150–450)
POTASSIUM SERPL-SCNC: 3.5 MMOL/L (ref 3.5–5.3)
RBC # BLD AUTO: 4.27 X10*6/UL (ref 4.5–5.9)
RH FACTOR (ANTIGEN D): NORMAL
SODIUM SERPL-SCNC: 141 MMOL/L (ref 136–145)
UFH PPP CHRO-ACNC: 1.1 IU/ML (ref ?–1.1)
UFH PPP CHRO-ACNC: 1.6 IU/ML (ref ?–1.1)
WBC # BLD AUTO: 6.6 X10*3/UL (ref 4.4–11.3)

## 2025-05-17 PROCEDURE — 86901 BLOOD TYPING SEROLOGIC RH(D): CPT | Performed by: STUDENT IN AN ORGANIZED HEALTH CARE EDUCATION/TRAINING PROGRAM

## 2025-05-17 PROCEDURE — 1200000002 HC GENERAL ROOM WITH TELEMETRY DAILY

## 2025-05-17 PROCEDURE — 2500000001 HC RX 250 WO HCPCS SELF ADMINISTERED DRUGS (ALT 637 FOR MEDICARE OP): Performed by: INTERNAL MEDICINE

## 2025-05-17 PROCEDURE — 83735 ASSAY OF MAGNESIUM: CPT | Performed by: STUDENT IN AN ORGANIZED HEALTH CARE EDUCATION/TRAINING PROGRAM

## 2025-05-17 PROCEDURE — 97110 THERAPEUTIC EXERCISES: CPT | Mod: CQ,GP

## 2025-05-17 PROCEDURE — 36415 COLL VENOUS BLD VENIPUNCTURE: CPT | Performed by: STUDENT IN AN ORGANIZED HEALTH CARE EDUCATION/TRAINING PROGRAM

## 2025-05-17 PROCEDURE — 85520 HEPARIN ASSAY: CPT | Performed by: STUDENT IN AN ORGANIZED HEALTH CARE EDUCATION/TRAINING PROGRAM

## 2025-05-17 PROCEDURE — 99223 1ST HOSP IP/OBS HIGH 75: CPT | Performed by: INTERNAL MEDICINE

## 2025-05-17 PROCEDURE — 85027 COMPLETE CBC AUTOMATED: CPT | Performed by: STUDENT IN AN ORGANIZED HEALTH CARE EDUCATION/TRAINING PROGRAM

## 2025-05-17 PROCEDURE — 2500000005 HC RX 250 GENERAL PHARMACY W/O HCPCS: Performed by: STUDENT IN AN ORGANIZED HEALTH CARE EDUCATION/TRAINING PROGRAM

## 2025-05-17 PROCEDURE — 2500000004 HC RX 250 GENERAL PHARMACY W/ HCPCS (ALT 636 FOR OP/ED): Performed by: INTERNAL MEDICINE

## 2025-05-17 PROCEDURE — 2500000002 HC RX 250 W HCPCS SELF ADMINISTERED DRUGS (ALT 637 FOR MEDICARE OP, ALT 636 FOR OP/ED): Performed by: STUDENT IN AN ORGANIZED HEALTH CARE EDUCATION/TRAINING PROGRAM

## 2025-05-17 PROCEDURE — 85730 THROMBOPLASTIN TIME PARTIAL: CPT | Performed by: STUDENT IN AN ORGANIZED HEALTH CARE EDUCATION/TRAINING PROGRAM

## 2025-05-17 PROCEDURE — 2500000004 HC RX 250 GENERAL PHARMACY W/ HCPCS (ALT 636 FOR OP/ED): Mod: JZ | Performed by: STUDENT IN AN ORGANIZED HEALTH CARE EDUCATION/TRAINING PROGRAM

## 2025-05-17 PROCEDURE — 82947 ASSAY GLUCOSE BLOOD QUANT: CPT

## 2025-05-17 PROCEDURE — 99232 SBSQ HOSP IP/OBS MODERATE 35: CPT

## 2025-05-17 PROCEDURE — 84100 ASSAY OF PHOSPHORUS: CPT | Performed by: INTERNAL MEDICINE

## 2025-05-17 PROCEDURE — 2500000001 HC RX 250 WO HCPCS SELF ADMINISTERED DRUGS (ALT 637 FOR MEDICARE OP): Performed by: STUDENT IN AN ORGANIZED HEALTH CARE EDUCATION/TRAINING PROGRAM

## 2025-05-17 RX ORDER — LIDOCAINE 560 MG/1
1 PATCH PERCUTANEOUS; TOPICAL; TRANSDERMAL DAILY
Status: DISCONTINUED | OUTPATIENT
Start: 2025-05-17 | End: 2025-05-20 | Stop reason: HOSPADM

## 2025-05-17 RX ORDER — POTASSIUM CHLORIDE 20 MEQ/1
20 TABLET, EXTENDED RELEASE ORAL ONCE
Status: COMPLETED | OUTPATIENT
Start: 2025-05-17 | End: 2025-05-17

## 2025-05-17 RX ORDER — HEPARIN SODIUM 10000 [USP'U]/100ML
0-4500 INJECTION, SOLUTION INTRAVENOUS CONTINUOUS
Status: CANCELLED | OUTPATIENT
Start: 2025-05-17

## 2025-05-17 RX ORDER — INSULIN GLARGINE 100 [IU]/ML
10 INJECTION, SOLUTION SUBCUTANEOUS NIGHTLY
Status: DISCONTINUED | OUTPATIENT
Start: 2025-05-17 | End: 2025-05-20 | Stop reason: HOSPADM

## 2025-05-17 RX ORDER — HEPARIN SODIUM 10000 [USP'U]/100ML
0-4500 INJECTION, SOLUTION INTRAVENOUS CONTINUOUS
Status: DISCONTINUED | OUTPATIENT
Start: 2025-05-17 | End: 2025-05-18

## 2025-05-17 RX ORDER — ACETAMINOPHEN 325 MG/1
650 TABLET ORAL
Status: DISCONTINUED | OUTPATIENT
Start: 2025-05-17 | End: 2025-05-20 | Stop reason: HOSPADM

## 2025-05-17 RX ADMIN — METOPROLOL TARTRATE 25 MG: 25 TABLET, FILM COATED ORAL at 09:01

## 2025-05-17 RX ADMIN — INSULIN GLARGINE 10 UNITS: 100 INJECTION, SOLUTION SUBCUTANEOUS at 20:47

## 2025-05-17 RX ADMIN — METOPROLOL TARTRATE 25 MG: 25 TABLET, FILM COATED ORAL at 20:46

## 2025-05-17 RX ADMIN — ISOSORBIDE MONONITRATE 30 MG: 30 TABLET, EXTENDED RELEASE ORAL at 09:01

## 2025-05-17 RX ADMIN — LIDOCAINE 4% 1 PATCH: 40 PATCH TOPICAL at 20:45

## 2025-05-17 RX ADMIN — HEPARIN SODIUM AND DEXTROSE 1872 UNITS/HR: 10000; 5 INJECTION INTRAVENOUS at 16:04

## 2025-05-17 RX ADMIN — ASPIRIN 81 MG CHEWABLE TABLET 81 MG: 81 TABLET CHEWABLE at 09:01

## 2025-05-17 RX ADMIN — POTASSIUM CHLORIDE 20 MEQ: 1500 TABLET, EXTENDED RELEASE ORAL at 09:05

## 2025-05-17 RX ADMIN — ACETAMINOPHEN 650 MG: 325 TABLET ORAL at 20:46

## 2025-05-17 RX ADMIN — INSULIN LISPRO 1 UNITS: 100 INJECTION, SOLUTION INTRAVENOUS; SUBCUTANEOUS at 17:31

## 2025-05-17 RX ADMIN — HEPARIN SODIUM 5000 UNITS: 5000 INJECTION, SOLUTION INTRAVENOUS; SUBCUTANEOUS at 13:04

## 2025-05-17 RX ADMIN — HEPARIN SODIUM 5000 UNITS: 5000 INJECTION, SOLUTION INTRAVENOUS; SUBCUTANEOUS at 05:08

## 2025-05-17 RX ADMIN — ATORVASTATIN CALCIUM 80 MG: 80 TABLET, FILM COATED ORAL at 09:01

## 2025-05-17 ASSESSMENT — COGNITIVE AND FUNCTIONAL STATUS - GENERAL
TOILETING: A LITTLE
TURNING FROM BACK TO SIDE WHILE IN FLAT BAD: A LITTLE
STANDING UP FROM CHAIR USING ARMS: A LITTLE
MOVING TO AND FROM BED TO CHAIR: A LITTLE
CLIMB 3 TO 5 STEPS WITH RAILING: TOTAL
DRESSING REGULAR UPPER BODY CLOTHING: A LITTLE
STANDING UP FROM CHAIR USING ARMS: A LOT
MOVING FROM LYING ON BACK TO SITTING ON SIDE OF FLAT BED WITH BEDRAILS: A LITTLE
TURNING FROM BACK TO SIDE WHILE IN FLAT BAD: A LITTLE
MOVING FROM LYING ON BACK TO SITTING ON SIDE OF FLAT BED WITH BEDRAILS: A LITTLE
WALKING IN HOSPITAL ROOM: A LOT
DRESSING REGULAR LOWER BODY CLOTHING: A LOT
MOBILITY SCORE: 15
HELP NEEDED FOR BATHING: A LITTLE
DAILY ACTIVITIY SCORE: 19
CLIMB 3 TO 5 STEPS WITH RAILING: A LOT
MOBILITY SCORE: 16
MOVING TO AND FROM BED TO CHAIR: A LITTLE
WALKING IN HOSPITAL ROOM: A LITTLE

## 2025-05-17 ASSESSMENT — PAIN SCALES - GENERAL: PAINLEVEL_OUTOF10: 0 - NO PAIN

## 2025-05-17 NOTE — CONSULTS
Consults  History Of Present Illness:    Dennis Galo is a 85 y.o. male presenting with mechanical fall.  Has H/O atrial fibrillation.  Longstanding musculoskeletal chest pain     Last Recorded Vitals:  Vitals:    05/16/25 2041 05/16/25 2355 05/17/25 0416 05/17/25 0434   BP: 175/90 155/79 (!) 184/77 125/61   BP Location: Right arm Right arm  Right arm   Patient Position: Lying Lying  Lying   Pulse: 85 79 92 67   Resp: 18 14  16   Temp: 36.5 °C (97.7 °F) 36.9 °C (98.4 °F) 36.7 °C (98.1 °F) 36.4 °C (97.5 °F)   TempSrc: Temporal Temporal  Temporal   SpO2: 92% 93% 94% 98%   Weight:       Height:           Last Labs:  CBC - 5/17/2025:  7:12 AM  6.6 12.5 231    39.7      CMP - 5/17/2025:  7:12 AM  8.3 6.6 13 --- 1.1   2.6 3.1 8 207      PTT - No results in last year.  _   _ _     Troponin I, High Sensitivity   Date/Time Value Ref Range Status   05/15/2025 08:21 AM 15 0 - 20 ng/L Final     BNP   Date/Time Value Ref Range Status   05/15/2025 08:21  (H) 0 - 99 pg/mL Final   01/16/2019 04:15  (H) 0 - 99 pg/mL Final     Comment:     .  <100 pg/mL - Heart failure unlikely  100-299 pg/mL - Intermediate probability of acute heart  .               failure exacerbation. Correlate with clinical  .               context and patient history.    >=300 pg/mL - Heart Failure likely. Correlate with clinical  .               context and patient history.  BNP testing is performed using different testing   methodology at [facility] than at other   [facility] hospitals. Direct result comparisons should   only be made within the same method.     01/02/2019 01:51  (H) 0 - 99 pg/mL Final     Comment:     .  <100 pg/mL - Heart failure unlikely  100-299 pg/mL - Intermediate probability of acute heart  .               failure exacerbation. Correlate with clinical  .               context and patient history.    >=300 pg/mL - Heart Failure likely. Correlate with clinical  .               context and patient  history.  BNP testing is performed using different testing   methodology at [facility] than at other   [facility] hospitals. Direct result comparisons should   only be made within the same method.       POC HEMOGLOBIN A1c   Date/Time Value Ref Range Status   07/09/2024 04:05 PM 9.3 (A) 4.2 - 6.5 % Final     Hemoglobin A1C   Date/Time Value Ref Range Status   10/25/2024 03:19 PM 9.2 (H) See comment % Final   03/12/2024 02:52 PM 10.0 (H) see below % Final     VLDL   Date/Time Value Ref Range Status   08/03/2022 01:48 PM 21 0 - 40 mg/dL Final   08/24/2021 01:23 PM 32 0 - 40 mg/dL Final   06/12/2020 01:23 PM 38 0 - 40 mg/dL Final      Last I/O:  I/O last 3 completed shifts:  In: 1400 (13.4 mL/kg) [P.O.:400; I.V.:1000 (9.6 mL/kg)]  Out: 1600 (15.3 mL/kg) [Urine:1600 (0.4 mL/kg/hr)]  Weight: 104.3 kg     Past Cardiology Tests (Last 3 Years):  EKG:  ECG 12 lead 05/15/2025 (Preliminary)    Echo:  Transthoracic Echo Complete 05/16/2025      Transthoracic Echo (TTE) Complete 12/11/2023    Ejection Fractions:  EF   Date/Time Value Ref Range Status   05/16/2025 02:10 PM 48 %    12/11/2023 11:16 AM 40       Cath:  No results found for this or any previous visit from the past 1095 days.    Stress Test:  No results found for this or any previous visit from the past 1095 days.    Cardiac Imaging:  No results found for this or any previous visit from the past 1095 days.      Past Medical History:  He has a past medical history of CAD (coronary artery disease), CHF (congestive heart failure), Chronic indwelling Deal catheter, Chronic renal disease, stage III (Multi), Diabetes mellitus (Multi), Eczema, Hiatal hernia, Hypertension, Infarction of spleen, Intracardiac thrombosis, not elsewhere classified, Old myocardial infarction, Prostate cancer (Multi), Pulmonary nodules, Renal cyst, and TIA (transient ischemic attack).    Past Surgical History:  He has a past surgical history that includes Pilonidal cyst drainage  "(07/26/2018); Cardiac catheterization (2013); Prostate surgery; and Coronary artery bypass graft.      Social History:  He reports that he quit smoking about 17 years ago. His smoking use included cigarettes. He has been exposed to tobacco smoke. He has never used smokeless tobacco. He reports that he does not drink alcohol and does not use drugs.    Family History:  Family History[1]     Allergies:  Patient has no known allergies.    Inpatient Medications:  Scheduled Medications[2]  PRN Medications[3]  Continuous Medications[4]  Outpatient Medications:  Current Outpatient Medications   Medication Instructions    ammonium lactate (Amlactin) 12 % cream 1 Application, As needed    aspirin 81 mg, Daily    atorvastatin (LIPITOR) 80 mg, oral, Daily    blood sugar diagnostic (OneTouch Ultra Test) strip USE ONCE DAILY AS DIRECTED    FreeStyle glucose monitoring (OneTouch Ultra2 Meter) kit As directed    gabapentin (NEURONTIN) 100 mg, 3 times daily    isosorbide mononitrate ER (IMDUR) 30 mg, oral, Daily    lancets (OneTouch UltraSoft Lancets) misc Use once daily    Lantus Solostar U-100 Insulin 24 Units, subcutaneous, Every morning    leuprolide acetate (LEUPROLIDE, BULK, MISC) 5 mg, Every 6 months    metoprolol tartrate (LOPRESSOR) 25 mg, oral, 2 times daily    nystatin (Mycostatin) 100,000 unit/gram powder 1 Application, Topical, 2 times daily    pen needle, diabetic (BD Arelis 2nd Gen Pen Needle) 32 gauge x 5/32\" needle USE ONCE DAILY    SITagliptin phosphate (JANUVIA) 100 mg, Daily    torsemide (DEMADEX) 60 mg, oral, Daily    [START ON 5/18/2025] Trulicity 3 mg, subcutaneous, Once Weekly       Physical Exam:  GEN: 84 yo wm sitting comfortably in the bedside chair  HEENT: Atraumatic, normocephalic  COR: Irreg. Irreg  LUNGS: Clear  EXT: Warm     Assessment/Plan   1.) Afib rate well controlled on tele review  2.) Musculoskeletal chest pain  3.) [facility]  [facility] cardiac status                              " JLS  Peripheral IV 05/15/25 20 G Left Antecubital (Active)   Site Assessment Clean;Dry 05/17/25 0900   Dressing Status Clean;Dry 05/17/25 0900   Number of days: 2       Code Status:  Full Code    I spent 30 minutes in the professional and overall care of this patient.        Ayush Kaur MD       [1] No family history on file.  [2]   Scheduled medications   Medication Dose Route Frequency    aspirin  81 mg oral Daily    atorvastatin  80 mg oral Daily    heparin (porcine)  5,000 Units subcutaneous q8h    insulin glargine  10 Units subcutaneous Nightly    insulin lispro  0-5 Units subcutaneous TID AC    isosorbide mononitrate ER  30 mg oral Daily    metoprolol tartrate  25 mg oral BID    perflutren lipid microspheres  0.5-10 mL of dilution intravenous Once in imaging   [3]   PRN medications   Medication    dextrose    dextrose    glucagon    glucagon    hydrALAZINE    morphine    oxyCODONE-acetaminophen   [4]   Continuous Medications   Medication Dose Last Rate

## 2025-05-17 NOTE — CARE PLAN
The patient's goals for the shift include      The clinical goals for the shift include comfort and safety    Over the shift, the patient did not make progress toward the following goals. Barriers to progression include assist with turning.

## 2025-05-17 NOTE — CARE PLAN
The patient's goals for the shift include      The clinical goals for the shift include comfort and safety    Over the shift, the patient did not make progress toward the following goals. Barriers to progression include assist with care.

## 2025-05-17 NOTE — PROGRESS NOTES
PODIATRY SERVICE CONSULT PROGRESS NOTE    SERVICE DATE: 5/17/2025   SERVICE TIME:  10:40    Subjective   INTERVAL HPI:   Patient was seen at bedside.   Pain well controlled. Edema has improved with compression.  Patient denies any constitutional symptoms.   No other pedal complaints.     Medications:  Scheduled Meds: Scheduled Medications[1]  Continuous Infusions: Continuous Medications[2]  PRN Meds: PRN Medications[3]         Objective   PHYSICAL EXAM:  Physical Exam Performed:  Vitals:    05/17/25 0434   BP: 125/61   Pulse: 67   Resp: 16   Temp: 36.4 °C (97.5 °F)   SpO2: 98%     Body mass index is 33 kg/m².    Patient is AOx3 and in no acute distress. Patient is alert and cooperative. Sitting comfortably in bed with dressings clean, dry and intact. Unaccompanied.     Vascular: Non-palpable DP/PT pulses B/L. Moderate 1+ pitting edema noted B/L, improved since yesterday. Perimalleolar varicosities appreciable. Hair growth absent B/L. CFT<5 to B/L hallux. Temperature is warm to warm from tibial tuberosity to distal digits B/L.     Musculoskeletal: No tenderness to palpation B/L. Gross active and passive ROM intact to age and activity level. Moves all extremities spontaneously.     Neurological: Intact light touch sensation B/L. Pain stimuli diminished B/L.     Dermatologic: Left pretibial bulla as noted below. Small dry scab to right pretibial region. Nails 1-5 are intact B/L. Skin appears diffusely xerotic with some erythema consistent with venous stasis dermatitis B/L. Web spaces 1-4 B/L are clean, dry and intact.     Left pretibial bulla, right pretibial scab  Images:    Measurements: 7.0 x 4.5 cm with epithelial roof grossly intact, able to drain medially and distally.  Minimal serosanguinous drainage  No ashley-wound maceration.   No ashley-wound erythema.   No evidence of ascending cellulitis or lymphangitis.  Minimal palpable fluctuance, resolves with directed drainage. No malodor. No increased warmth. No  purulence. No necrosis. No generalized weeping.  No probe to bone or deep structures within the wound base.     Cultures:  No results found for the last 90 days.      LABS:     Lab Results   Component Value Date    HGBA1C 9.2 (H) 10/25/2024        Results from last 7 days   Lab Units 05/17/25  0712   WBC AUTO x10*3/uL 6.6   RBC AUTO x10*6/uL 4.27*   HEMOGLOBIN g/dL 12.5*   HEMATOCRIT % 39.7*     Results from last 7 days   Lab Units 05/17/25  0712 05/16/25  0609 05/15/25  0821   SODIUM mmol/L 141   < > 141   POTASSIUM mmol/L 3.5   < > 3.6   CHLORIDE mmol/L 104   < > 103   CO2 mmol/L 27   < > 28   BUN mg/dL 24*   < > 28*   CREATININE mg/dL 1.95*   < > 2.43*   CALCIUM mg/dL 8.3*   < > 8.6   PHOSPHORUS mg/dL 2.6  --   --    MAGNESIUM mg/dL 1.81  --  1.77   BILIRUBIN TOTAL mg/dL  --   --  1.1   ALT U/L  --   --  8*   AST U/L  --   --  13    < > = values in this interval not displayed.                Assessment/Plan   ASSESSMENT & PLAN:    # Chronic venous insufficiency  # Lower extremity edema  # Bulla left lower extremity  # Type 2 DM    - Patient was seen and evaluated; all findings were discussed and all questions were answered to patient's satisfaction.  - Charts, labs, vitals and imaging all reviewed.   - Labs: WBC 6.6, HGB 12.5, SCr 1.95  - PVRs: ordered and pending    Plan:  - Evaluated bedside on nursing floor. Dressing changed as noted below.   - No plan for podiatric surgical intervention during this hospitalization.  - ABIs of BLE ordered to assess arterial flow and have a baseline, given non-palpable pedal pulses.  - Dressings: Cleanse both legs with vashe and pat dry. Dress left leg with Adaptic, silver alginate, ABD pad, kerlix and ace wrap. Right leg just ace wrap mild compression. To be changed daily.   - Nursing staff is able to change dressing as ordered. Thank you.  - Pain regimen per primary  - Bowel regimen per primary  - Podiatry will continue to follow peripherally while in house. Thank you for the  consult.  - Evaluated with attending Dr. Mcleod.    DVT ppx: subcutaneous heparin, per primary  Weightbearing: WBAT BLE  Discharge: Pt to follow up 1 week after discharge with Dr. Mcleod or Dr. Ledesma    This patient will be followed peripherally by the Podiatry service. Please Epic Chat the corresponding residents below with questions or concerns.     Eddie Altamirano DPM PGY-3  Podiatric Medicine and Surgery   Epic Secure Chat Preferred        SIGNATURE: Eddie Altamirano DPM PATIENT NAME: Dennis Galo   DATE: May 17, 2025 MRN: [id number]   TIME: 11:06 AM CONTACT: Epic Chat          [1] aspirin, 81 mg, oral, Daily  atorvastatin, 80 mg, oral, Daily  heparin (porcine), 5,000 Units, subcutaneous, q8h  insulin glargine, 10 Units, subcutaneous, Nightly  insulin lispro, 0-5 Units, subcutaneous, TID AC  isosorbide mononitrate ER, 30 mg, oral, Daily  metoprolol tartrate, 25 mg, oral, BID  perflutren lipid microspheres, 0.5-10 mL of dilution, intravenous, Once in imaging  [2]    [3] PRN medications: dextrose, dextrose, glucagon, glucagon, hydrALAZINE, morphine, oxyCODONE-acetaminophen

## 2025-05-17 NOTE — PROGRESS NOTES
Physical Therapy    Physical Therapy Treatment    Patient Name: Dennis Galo  MRN: [id number]  Department: [facility]  Room: [address] [place] [address]  Today's Date: 5/17/2025  Time Calculation  Start Time: 1144  Stop Time: 1200  Time Calculation (min): 16 min         Assessment/Plan   PT Assessment  Assessment Comment: Patient participated in trnasfers, bed mobility, therex and g/t. Motivation required from therapist required to sty up in chair. Continued therapy required to adhere to PT goals. Pain keeping patient from being motivated to ambulate.  End of Session Patient Position: Up in chair     PT Plan  Treatment/Interventions: Bed mobility, Transfer training, Gait training  PT Plan: Ongoing PT  PT Frequency: 4 times per week  PT Discharge Recommendations: Moderate intensity level of continued care, Low intensity level of continued care (depending on progress)  PT Recommended Transfer Status: Assist x1  PT - OK to Discharge: Yes    PT Visit Info:  PT Received On: 05/17/25     General Visit Information:   General  Prior to Session Communication: Bedside nurse  Patient Position Received: Bed, 2 rail up, Bed, 4 rail up  General Comment: Patient cleared for therapy by nursing (Mariaelena). Patient supine in bed upon arrival. States he has minimal pain in rt hip area 2/10 while sedentary and 8/10 during movement.    Subjective   Precautions:        Date/Time Vitals Session Patient Position Pulse Resp SpO2 BP MAP (mmHg)    05/17/25 12:34:36 --  --  85  --  93 %  139/101  116                 Objective   Pain:     Cognition:     Coordination:     Postural Control:     Extremity/Trunk Assessments:    Activity Tolerance:     Treatments:  Therapeutic Exercise  Therapeutic Exercise Performed: Yes (Supine B LE heel slides and ABD/ADD Seated B LE APs LAQ and alt marches.)    Bed Mobility  Bed Mobility: Yes (Sup to sit HOB elevated 30% used bed rail and Mod A)    Ambulation/Gait Training  Ambulation/Gait Training Performed: Yes (Minimal G/T D/T pain  and instability. 4 steps forward 4 steps retro)  Transfers  Transfer: Yes (Sit to stand VCs for correct hand placement for safe and correct technique, good return demo noted. Stand to sit SBA.)    Outcome Measures:  [facility] Basic Mobility  Turning from your back to your side while in a flat bed without using bedrails: A little  Moving from lying on your back to sitting on the side of a flat bed without using bedrails: A little  Moving to and from bed to chair (including a wheelchair): A little  Standing up from a chair using your arms (e.g. wheelchair or bedside chair): A little  To walk in hospital room: A little  Climbing 3-5 steps with railing: Total  Basic Mobility - Total Score: 16    Education Documentation  Precautions, taught by Bernardino Ricci PTA at 5/17/2025  1:17 PM.  Learner: Patient  Readiness: Acceptance  Method: Explanation  Response: Verbalizes Understanding    Mobility Training, taught by Bernardino Ricci PTA at 5/17/2025  1:17 PM.  Learner: Patient  Readiness: Acceptance  Method: Explanation  Response: Verbalizes Understanding    ADL Training, taught by Bernardino Ricci PTA at 5/17/2025  1:17 PM.  Learner: Patient  Readiness: Acceptance  Method: Explanation  Response: Verbalizes Understanding    Education Comments  No comments found.        OP EDUCATION:       Encounter Problems       Encounter Problems (Active)       PT Problem       STG - Pt will transition supine <> sitting with SUP  (Progressing)       Start:  05/16/25    Expected End:  05/30/25            STG - Pt will transfer STS with SUP  (Progressing)       Start:  05/16/25    Expected End:  05/30/25            STG - Pt will amb 40' using RW with SBA  (Progressing)       Start:  05/16/25    Expected End:  05/30/25               Pain - Adult

## 2025-05-17 NOTE — PROGRESS NOTES
Dennis Galo is a 85 y.o. male on day 2 of admission presenting with Localized swelling, mass and lump, lower limb, bilateral.      Subjective   Pt's hip pain improved, no compalints       Objective     Last Recorded Vitals  BP (!) 139/101   Pulse 85   Temp 37 °C (98.6 °F)   Resp 16   Wt 104 kg (230 lb)   SpO2 93%     Physical Exam  G: aox3, NAD, cooperative  HENT: neck supple, no JVD  Eyes: clear sclera  CV: irregularly irregular, rate controlled s1 s2  L: clear  Abd: soft, NT, non distended suprapubic cather  Ext: 2+ b/l LE pitting edema, L LE with fluid filled bulla  N: no appreciable acute focal deficits  Psych: appropriate mood and behavior     Assessment & Plan      R hip pain secondary to mechanical fall and metastatic prostate CA with bony mets  Chronic venous insufficiency/lymphedema with L LE bulla  LAURA on CKD III  PAF (newly diagnosed), rate controlled  DM II with mild hypoglycemia    CAD s/p PCI  HTN, HLD  CHFmrEF   H/o CVA  Prostate CA with SBT  DVT ppx  Full code    Plan:  - Discussed CT findings with patient, he is quite a poor historian, discussed with his wife who also is not the greatest historian, he no longer follows with heme/onc, findings concerning for metastatic prostate CA, pain control, will refer back to heme/onc Dr. Allen at discharge who he used to follow with, PT/OT elizabeth, I did get pt's permission to speak to his younger brother Kenny [phone] who is able to provide much more information regarding pt's health and updated findings on concern for bony mets  - Follows with nephrology as outpatient, his nephrologist consulted, diuretics currently on hold, Cr improved, resumption of diuretics per nephrology  - In new rate controlled Afib, continue metoprolol, his brother did confirm that there is no history of afib, discussed with cardiology, will trial patient on heparin drip to ensure he can tolerate anticoagulation as he has had intermittent issues with mild hematuria with  his suprapubic catheter, if tolerates will then transition to Eliquis  - Podiatry consulted with regards to LE care, LE wound care, compression as tolerated, has ordered JAVON  - Reduce lantus dose, hypoglycemia protocol  - PT/OT, discharge planning        Rico Verduzco,

## 2025-05-17 NOTE — PROGRESS NOTES
Nephrology Consult Progress Note    Dennis Galo is a 85 y.o. male on day 2 of admission presenting with Localized swelling, mass and lump, lower limb, bilateral.      Subjective   No acute events overnight, walks with a walker, frail       Objective          Physical Exam    Vitals 24HR  Heart Rate:  [67-92]   Temp:  [36.4 °C (97.5 °F)-37 °C (98.6 °F)]   Resp:  [14-18]   BP: (125-184)/()   SpO2:  [92 %-98 %]        NAD, sick looking, pale on RA  Decreased AEBL  Irregular RR  Abd distended, + BS  Edema, chronic skin changes, dressings on           I&O 24HR    Intake/Output Summary (Last 24 hours) at 5/17/2025 1441  Last data filed at 5/17/2025 0434  Gross per 24 hour   Intake 1000 ml   Output 850 ml   Net 150 ml         Medications  Prescriptions Prior to Admission[1]   Scheduled medications  Scheduled Medications[2]  Continuous medications  Continuous Medications[3]  PRN medications  PRN Medications[4]    Relevant Results      Admission on 05/15/2025   Component Date Value Ref Range Status    WBC 05/15/2025 6.7  4.4 - 11.3 x10*3/uL Final    nRBC 05/15/2025 0.0  0.0 - 0.0 /100 WBCs Final    RBC 05/15/2025 4.38 (L)  4.50 - 5.90 x10*6/uL Final    Hemoglobin 05/15/2025 12.8 (L)  13.5 - 17.5 g/dL Final    Hematocrit 05/15/2025 40.7 (L)  41.0 - 52.0 % Final    MCV 05/15/2025 93  80 - 100 fL Final    MCH 05/15/2025 29.2  26.0 - 34.0 pg Final    MCHC 05/15/2025 31.4 (L)  32.0 - 36.0 g/dL Final    RDW 05/15/2025 13.4  11.5 - 14.5 % Final    Platelets 05/15/2025 223  150 - 450 x10*3/uL Final    Neutrophils % 05/15/2025 74.8  40.0 - 80.0 % Final    Immature Granulocytes %, Automated 05/15/2025 0.5  0.0 - 0.9 % Final    Immature Granulocyte Count (IG) includes promyelocytes, myelocytes and metamyelocytes but does not include bands. Percent differential counts (%) should be interpreted in the context of the absolute cell counts (cells/UL).    Lymphocytes % 05/15/2025 12.3  13.0 - 44.0 % Final    Monocytes %  05/15/2025 8.0  2.0 - 10.0 % Final    Eosinophils % 05/15/2025 3.3  0.0 - 6.0 % Final    Basophils % 05/15/2025 1.1  0.0 - 2.0 % Final    Neutrophils Absolute 05/15/2025 4.98  1.60 - 5.50 x10*3/uL Final    Percent differential counts (%) should be interpreted in the context of the absolute cell counts (cells/uL).    Immature Granulocytes Absolute, Au* 05/15/2025 0.03  0.00 - 0.50 x10*3/uL Final    Lymphocytes Absolute 05/15/2025 0.82  0.80 - 3.00 x10*3/uL Final    Monocytes Absolute 05/15/2025 0.53  0.05 - 0.80 x10*3/uL Final    Eosinophils Absolute 05/15/2025 0.22  0.00 - 0.40 x10*3/uL Final    Basophils Absolute 05/15/2025 0.07  0.00 - 0.10 x10*3/uL Final    Glucose 05/15/2025 100 (H)  74 - 99 mg/dL Final    Sodium 05/15/2025 141  136 - 145 mmol/L Final    Potassium 05/15/2025 3.6  3.5 - 5.3 mmol/L Final    Chloride 05/15/2025 103  98 - 107 mmol/L Final    Bicarbonate 05/15/2025 28  21 - 32 mmol/L Final    Anion Gap 05/15/2025 14  10 - 20 mmol/L Final    Urea Nitrogen 05/15/2025 28 (H)  6 - 23 mg/dL Final    Creatinine 05/15/2025 2.43 (H)  0.50 - 1.30 mg/dL Final    eGFR 05/15/2025 26 (L)  >60 mL/min/1.73m*2 Final    Calculations of estimated GFR are performed using the 2021 CKD-EPI Study Refit equation without the race variable for the IDMS-Traceable creatinine methods.  https://jasn.asnjournals.org/content/early/2021/09/22/ASN.6727285766    Calcium 05/15/2025 8.6  8.6 - 10.3 mg/dL Final    Albumin 05/15/2025 3.4  3.4 - 5.0 g/dL Final    Alkaline Phosphatase 05/15/2025 207 (H)  33 - 136 U/L Final    Total Protein 05/15/2025 6.6  6.4 - 8.2 g/dL Final    AST 05/15/2025 13  9 - 39 U/L Final    Bilirubin, Total 05/15/2025 1.1  0.0 - 1.2 mg/dL Final    ALT 05/15/2025 8 (L)  10 - 52 U/L Final    Patients treated with Sulfasalazine may generate falsely decreased results for ALT.    Magnesium 05/15/2025 1.77  1.60 - 2.40 mg/dL Final    Troponin I, High Sensitivity 05/15/2025 15  0 - 20 ng/L Final    Ventricular Rate  05/15/2025 81  BPM Preliminary    Atrial Rate 05/15/2025 0  BPM Preliminary    VT Interval 05/15/2025 210  ms Preliminary    QRS Duration 05/15/2025 108  ms Preliminary    QT Interval 05/15/2025 402  ms Preliminary    QTC Calculation(Bazett) 05/15/2025 467  ms Preliminary    R Axis 05/15/2025 -88  degrees Preliminary    T Axis 05/15/2025 60  degrees Preliminary    QRS Count 05/15/2025 14  beats Preliminary    Q Onset 05/15/2025 253  ms Preliminary    T Offset 05/15/2025 454  ms Preliminary    QTC Fredericia 05/15/2025 444  ms Preliminary    BNP 05/15/2025 380 (H)  0 - 99 pg/mL Final    D-Dimer Non VTE, Quant (ng/mL FEU) 05/15/2025 1,934 (H)  <=500 ng/mL FEU Final    POCT Glucose 05/15/2025 222 (H)  74 - 99 mg/dL Final    WBC 05/16/2025 6.8  4.4 - 11.3 x10*3/uL Final    nRBC 05/16/2025 0.0  0.0 - 0.0 /100 WBCs Final    RBC 05/16/2025 4.05 (L)  4.50 - 5.90 x10*6/uL Final    Hemoglobin 05/16/2025 11.8 (L)  13.5 - 17.5 g/dL Final    Hematocrit 05/16/2025 37.7 (L)  41.0 - 52.0 % Final    MCV 05/16/2025 93  80 - 100 fL Final    MCH 05/16/2025 29.1  26.0 - 34.0 pg Final    MCHC 05/16/2025 31.3 (L)  32.0 - 36.0 g/dL Final    RDW 05/16/2025 13.5  11.5 - 14.5 % Final    Platelets 05/16/2025 231  150 - 450 x10*3/uL Final    Glucose 05/16/2025 76  74 - 99 mg/dL Final    Sodium 05/16/2025 140  136 - 145 mmol/L Final    Potassium 05/16/2025 3.4 (L)  3.5 - 5.3 mmol/L Final    Chloride 05/16/2025 105  98 - 107 mmol/L Final    Bicarbonate 05/16/2025 29  21 - 32 mmol/L Final    Anion Gap 05/16/2025 9 (L)  10 - 20 mmol/L Final    Urea Nitrogen 05/16/2025 27 (H)  6 - 23 mg/dL Final    Creatinine 05/16/2025 2.17 (H)  0.50 - 1.30 mg/dL Final    eGFR 05/16/2025 29 (L)  >60 mL/min/1.73m*2 Final    Calculations of estimated GFR are performed using the 2021 CKD-EPI Study Refit equation without the race variable for the IDMS-Traceable creatinine methods.  https://jasn.asnjournals.org/content/early/2021/09/22/ASN.7736641641    Calcium  05/16/2025 8.2 (L)  8.6 - 10.3 mg/dL Final    POCT Glucose 05/15/2025 177 (H)  74 - 99 mg/dL Final    POCT Glucose 05/16/2025 81  74 - 99 mg/dL Final    POCT Glucose 05/16/2025 139 (H)  74 - 99 mg/dL Final    AV pk erica 05/16/2025 1.05  m/s Final    AV mn grad 05/16/2025 3  mmHg Final    LVOT diam 05/16/2025 2.00  cm Final    LA vol index A/L 05/16/2025 33.0  ml/m2 Final    Tricuspid annular plane systolic e* 05/16/2025 1.5  cm Final    LV EF 05/16/2025 48  % Final    RV free wall pk S' 05/16/2025 6.31  cm/s Final    LVIDd 05/16/2025 3.80  cm Final    Aortic Valve Area by Continuity of* 05/16/2025 2.73  cm2 Final    Aortic Valve Area by Continuity of* 05/16/2025 2.60  cm2 Final    AV pk grad 05/16/2025 4  mmHg Final    LV A4C EF 05/16/2025 50.0   Final    POCT Glucose 05/16/2025 131 (H)  74 - 99 mg/dL Final    Glucose 05/17/2025 69 (L)  74 - 99 mg/dL Final    Sodium 05/17/2025 141  136 - 145 mmol/L Final    Potassium 05/17/2025 3.5  3.5 - 5.3 mmol/L Final    Chloride 05/17/2025 104  98 - 107 mmol/L Final    Bicarbonate 05/17/2025 27  21 - 32 mmol/L Final    Anion Gap 05/17/2025 14  10 - 20 mmol/L Final    Urea Nitrogen 05/17/2025 24 (H)  6 - 23 mg/dL Final    Creatinine 05/17/2025 1.95 (H)  0.50 - 1.30 mg/dL Final    eGFR 05/17/2025 34 (L)  >60 mL/min/1.73m*2 Final    Calculations of estimated GFR are performed using the 2021 CKD-EPI Study Refit equation without the race variable for the IDMS-Traceable creatinine methods.  https://jasn.asnjournals.org/content/early/2021/09/22/ASN.5031096686    Calcium 05/17/2025 8.3 (L)  8.6 - 10.3 mg/dL Final    Phosphorus 05/17/2025 2.6  2.5 - 4.9 mg/dL Final    Albumin 05/17/2025 3.1 (L)  3.4 - 5.0 g/dL Final    WBC 05/17/2025 6.6  4.4 - 11.3 x10*3/uL Final    nRBC 05/17/2025 0.0  0.0 - 0.0 /100 WBCs Final    RBC 05/17/2025 4.27 (L)  4.50 - 5.90 x10*6/uL Final    Hemoglobin 05/17/2025 12.5 (L)  13.5 - 17.5 g/dL Final    Hematocrit 05/17/2025 39.7 (L)  41.0 - 52.0 % Final    MCV  05/17/2025 93  80 - 100 fL Final    MCH 05/17/2025 29.3  26.0 - 34.0 pg Final    MCHC 05/17/2025 31.5 (L)  32.0 - 36.0 g/dL Final    RDW 05/17/2025 13.4  11.5 - 14.5 % Final    Platelets 05/17/2025 231  150 - 450 x10*3/uL Final    Magnesium 05/17/2025 1.81  1.60 - 2.40 mg/dL Final    POCT Glucose 05/16/2025 150 (H)  74 - 99 mg/dL Final    POCT Glucose 05/17/2025 71 (L)  74 - 99 mg/dL Final    POCT Glucose 05/17/2025 120 (H)  74 - 99 mg/dL Final      Imaging  CT hip right wo IV contrast  Result Date: 5/16/2025  Sclerosis most likely due to blastic metastasis of right ischium, pubic bones and ileum as described. Additional findings as above.   Signed by: Romario Nguyen 5/16/2025 9:03 AM Dictation workstation:   [id number]    XR hip right with pelvis when performed 2 or 3 views  Result Date: 5/15/2025  1.  No evidence for acute fracture or dislocation. 2.  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 Signed by Lorenzo May MD      Cardiology, Vascular, and Other Imaging  Transthoracic Echo Complete  Result Date: 5/16/2025   [facility], [address] [place] [address]           Tel [phone] and Fax [phone] TRANSTHORACIC ECHOCARDIOGRAM REPORT  Patient Name:       BEVERLY Petersen Physician:    [id number] Ayush Kaur MD Study Date:         5/16/2025           Ordering Provider:    [id number] VALERI MAN MRN/PID:            [id number]            Fellow: Accession#:         [id number]        Nurse: Date of Birth/Age:  1940 / 85 years Sonographer:          Glo Greer RDCS Gender assigned at  M                   Additional Staff: Birth: Height:             177.00 cm           Admit Date:           5/15/2025 Weight:             104.00 kg           Admission Status:     Inpatient -                                                                Routine BSA / BMI:          2.20 m2 / 33.20     Encounter#:           [id number]                     kg/m2 Blood Pressure:     175/91 mmHg         Department Location:  [address] floor                                                               [facility] Study Type:    TRANSTHORACIC ECHO (TTE) COMPLETE Diagnosis/ICD: Dyspnea, unspecified-R06.00; Unspecified diastolic (congestive)                heart failure (CHF)-I50.30; Unspecified systolic (congestive)                heart failure (CHF)-I50.20 Indication:    Congestive heart failure, unspecified HF chronicity, unspecified                heart failure type;, Dyspnea CPT Code:      Echo Complete w Full Doppler-[id number] Patient History: Pertinent History: [facility] diabetes, CHF, CKD, prostate cancer presented to the                    hospital with chief complaint of leg pain. Study Detail: The following Echo studies were performed: 2D, M-Mode, Doppler,               color flow and 3D. Technically challenging study due to prominent               lung artifact. Unable to obtain suprasternal notch view.  PHYSICIAN INTERPRETATION: Left Ventricle: Left ventricular ejection fraction is mildly decreased, by visual estimate at 45-50%. Left venticular wall motion is abnormal. The left ventricular cavity size is decreased. There is severely increased septal and severely increased posterior left ventricular wall thickness. There is left ventricular concentric remodeling. Left ventricular diastolic filling is indeterminate. There is evidence of a moderate apical myocardial infarction. Left Atrium: The left atrial size is moderately dilated. Right Ventricle: The right ventricle is normal in size. There is normal right ventricular global systolic function. Right Atrium: The right atrium is mildly dilated. Aortic Valve: The aortic valve is trileaflet. The aortic valve dimensionless index is 0.87. There is no evidence of aortic valve regurgitation. The  peak instantaneous gradient of the aortic valve is 4 mmHg. The mean gradient of the aortic valve is 3 mmHg. Mitral Valve: The mitral valve is normal in structure. There is trace mitral valve regurgitation. Tricuspid Valve: The tricuspid valve is structurally normal. No evidence of tricuspid regurgitation. The right ventricular systolic pressure is unable to be estimated. Pulmonic Valve: The pulmonic valve is structurally normal. The pulmonic valve regurgitation was not well visualized. Pericardium: There is no pericardial effusion noted. Aorta: The aortic root is normal. There is no dilatation of the ascending aorta. There is no dilatation of the aortic root. In comparison to the previous echocardiogram(s): Compared with study dated 12/11/2023,.  CONCLUSIONS:  1. Left ventricular ejection fraction is mildly decreased, by visual estimate at 45-50%.  2. Abnormal left venticular wall motion.  3. Left ventricular diastolic filling is indeterminate.  4. Left ventricular cavity size is decreased.  5. There is a moderate apical myocardial infarction.  6. There is severely increased septal and severely increased posterior left ventricular wall thickness.  7. There is normal right ventricular global systolic function.  8. The left atrial size is moderately dilated. QUANTITATIVE DATA SUMMARY:  2D MEASUREMENTS:          Normal Ranges: LAs:             4.50 cm  (2.7-4.0cm) IVSd:            1.70 cm  (0.6-1.1cm) LVPWd:           1.60 cm  (0.6-1.1cm) LVIDd:           3.80 cm  (3.9-5.9cm) LVIDs:           2.80 cm LV Mass Index:   115 g/m2 LVEDV Index:     42 ml/m2 LV % FS          26.3 %  LEFT ATRIUM:                  Normal Ranges: LA Vol A4C:        62.8 ml    (22+/-6mL/m2) LA Vol A2C:        80.8 ml LA Vol BP:         72.8 ml LA Vol Index A4C:  28.5ml/m2 LA Vol Index A2C:  36.6 ml/m2 LA Vol Index BP:   33.0 ml/m2 LA Area A4C:       21.3 cm2 LA Area A2C:       24.7 cm2 LA Major Axis A4C: 6.1 cm LA Major Axis A2C: 6.4 cm LA Volume  Index:   33.0 ml/m2  RIGHT ATRIUM:                 Normal Ranges: RA Vol A4C:        44.2 ml    (8.3-19.5ml) RA Vol Index A4C:  20.1 ml/m2 RA Area A4C:       17.9 cm2 RA Major Axis A4C: 6.2 cm  M-MODE MEASUREMENTS:         Normal Ranges: Ao Root:             2.60 cm (2.0-3.7cm) LAs:                 5.00 cm (2.7-4.0cm)  AORTA MEASUREMENTS:         Normal Ranges: Ao Sinus, d:        3.00 cm (2.1-3.5cm) Ao STJ, d:          3.20 cm (1.7-3.4cm) Asc Ao, d:          3.40 cm (2.1-3.4cm)  LV SYSTOLIC FUNCTION:                      Normal Ranges: EF-A4C View:    50 % (>=55%) EF-A2C View:    44 % EF-Biplane:     47 % EF-Visual:      48 % LV EF Reported: 48 %  LV DIASTOLIC FUNCTION:            Normal Ranges: PulmV Sys Pal:         25.00 cm/s PulmV Bishop Pal:        61.20 cm/s PulmV S/D Pal:         0.40  AORTIC VALVE:                     Normal Ranges: AoV Vmax:                1.05 m/s (<=1.7m/s) AoV Peak P.4 mmHg (<20mmHg) AoV Mean PG:             3.0 mmHg (1.7-11.5mmHg) LVOT Max Pal:            0.87 m/s (<=1.1m/s) AoV VTI:                 18.40 cm (18-25cm) LVOT VTI:                16.00 cm LVOT Diameter:           2.00 cm  (1.8-2.4cm) AoV Area, VTI:           2.73 cm2 (2.5-5.5cm2) AoV Area,Vmax:           2.60 cm2 (2.5-4.5cm2) AoV Dimensionless Index: 0.87  RIGHT VENTRICLE: RV Basal 2.80 cm RV Mid   2.70 cm RV Major 9.4 cm TAPSE:   14.6 mm RV s'    0.06 m/s  TRICUSPID VALVE/RVSP:         Normal Ranges: IVC Diam:             2.33 cm  PULMONIC VALVE:          Normal Ranges: PV Max Pal:     0.7 m/s  (0.6-0.9m/s) PV Max P.7 mmHg  PULMONARY VEINS: PulmV Bishop Pal: 61.20 cm/s PulmV S/D Pal:  0.40 PulmV Sys Pal:  25.00 cm/s  [id number] Ayush Kaur MD Electronically signed on 2025 at 2:38:27 PM  ** Final **     ECG 12 lead  Result Date: 2025  Atrial fibrillation Inferior infarct, old Abnormal lateral Q waves Anterior infarct, old    Vascular US lower extremity venous duplex bilateral  Result Date:  5/15/2025           [address] [place], [place] [address] Tel [phone] and Fax [phone]  Vascular Lab Report VASC US LOWER EXTREMITY VENOUS DUPLEX BILATERAL  Patient Name:      BEVERLY KO JENNY   Reading Physician:  [id number] Danette Leahy MD Study Date:        5/15/2025           Ordering Physician: [id number] HEDRICK MRN/PID:           [id number]            Technologist:       Lucía Ledezma RVT Accession#:        [id number]        Technologist 2: Date of Birth/Age: 1940 / 85 years Encounter#:         [id number] Gender:            M Admission Status:  Emergency           Location Performed: [facility]  Diagnosis/ICD: Localized swelling, bilateral lower extremities-R22.43 CPT Codes:     30537 Peripheral venous duplex scan for DVT complete  CONCLUSIONS: Right Lower Venous: No evidence of acute deep vein thrombus visualized in the right lower extremity. Left Lower Venous: No evidence of acute deep vein thrombus visualized in the left lower extremity. Cannot rule out thrombus in non-visualized posterior tibial and peroneal veins due to edema.  Additional Findings: Pulsatile flow throughout bilateral venous system suggesting volume overload or elevated cardiac filling pressures.  Imaging & Doppler Findings:  Right                 Compressible Thrombus   Flow Distal External Iliac     Yes        None   Pulsatile CFV                       Yes        None   Pulsatile PFV                       Yes        None FV Proximal               Yes        None   Pulsatile FV Mid                    Yes        None FV Distal                 Yes        None Popliteal                 Yes        None   Pulsatile Peroneal                  Yes        None PTV                       Yes        None  Left                  Compress Thrombus   Flow Distal External Iliac   Yes      None   Pulsatile CFV                     Yes      None   Pulsatile PFV                     Yes      None FV Proximal             Yes       None   Pulsatile FV Mid                  Yes      None FV Distal               Yes      None Popliteal               Yes      None   Pulsatile  [id number] Danette Leahy MD Electronically signed by [id number] Danette Leahy MD on 5/15/2025 at 11:18:36 AM  ** Final **           ASSESSMENT AND PLAN    LAURA on CKD, baseline creatinine 1.5-1.6, was 2.4 on admission, improving  Underlying CHF EF 45%, Afib  Volume overload on torsemide 60 mg every day at home  BL renal cysts  Chronic liu for chronic retention, underlying metastatic prostate ca on leupron      Plan  - continue to hold torsemide, use prn, reassess in am tomorrow as will probably resume  - start daily k po  - chronic edema  - hematuria on chronic liu  - avoid hypotension and nephrotoxins, give lopressor with holding parameters  - trend HH    MARS reviewed, dose for GFR<30    Thank you for the opportunity to assist in the care of this patient, please call with questions  Nichole Fowler MD PhD           [1]   Medications Prior to Admission   Medication Sig Dispense Refill Last Dose/Taking    ammonium lactate (Amlactin) 12 % cream Apply 1 Application topically if needed for dry skin.   5/14/2025    aspirin 81 mg chewable tablet Chew 1 tablet (81 mg) once daily.   5/14/2025 Morning    atorvastatin (Lipitor) 80 mg tablet Take 1 tablet (80 mg) by mouth once daily. (Patient taking differently: Take 1 tablet (80 mg) by mouth once daily at bedtime.) 90 tablet 3 5/14/2025    blood sugar diagnostic (OneTouch Ultra Test) strip USE ONCE DAILY AS DIRECTED 100 strip 1 5/14/2025    FreeStyle glucose monitoring (OneTouch Ultra2 Meter) kit As directed 1 each 0 5/14/2025    isosorbide mononitrate ER (Imdur) 30 mg 24 hr tablet Take 1 tablet (30 mg) by mouth once daily. 90 tablet 3 5/14/2025 Morning    lancets (OneTouch UltraSoft Lancets) misc Use once daily 100 each 1 5/14/2025    metoprolol tartrate (Lopressor) 25 mg tablet TAKE 1 TABLET BY MOUTH TWICE A DAY (Patient taking differently:  "Take 1 tablet (25 mg) by mouth 2 times a day.) 180 tablet 1 5/14/2025    nystatin (Mycostatin) 100,000 unit/gram powder Apply 1 Application topically 2 times a day. 60 g 0 5/14/2025    pen needle, diabetic (BD Arelis 2nd Gen Pen Needle) 32 gauge x 5/32\" needle USE ONCE DAILY 100 each 2 5/14/2025    torsemide (Demadex) 20 mg tablet TAKE 3 TABLETS (60 MG) BY MOUTH ONCE DAILY. 270 tablet 1 5/14/2025 Morning    [START ON 5/18/2025] dulaglutide (Trulicity) 3 mg/0.5 mL injection INJECT 3 MG SUBCUTANEOUSLY WEEKLY 6 mL 1     gabapentin (Neurontin) 100 mg capsule Take 1 capsule (100 mg) by mouth 3 times a day. (Patient not taking: Reported on 5/15/2025)   Not Taking    insulin glargine (Lantus Solostar U-100 Insulin) 100 unit/mL (3 mL) pen INJECT 24 UNITS UNDER THE SKIN ONCE DAILY IN THE MORNING. 15 mL 1     leuprolide acetate (LEUPROLIDE, BULK, MISC) Inject 5 mg as directed every 6 months. Lupron 5 MG- by injection into muscle every 6 months  (Patient not taking: Reported on 5/15/2025)   Not Taking    SITagliptin phosphate (Januvia) 100 mg tablet Take 1 tablet (100 mg) by mouth once daily. (Patient not taking: Reported on 5/15/2025)   Not Taking   [2] aspirin, 81 mg, oral, Daily  atorvastatin, 80 mg, oral, Daily  heparin, 80 Units/kg, intravenous, Once  insulin glargine, 10 Units, subcutaneous, Nightly  insulin lispro, 0-5 Units, subcutaneous, TID AC  isosorbide mononitrate ER, 30 mg, oral, Daily  metoprolol tartrate, 25 mg, oral, BID  perflutren lipid microspheres, 0.5-10 mL of dilution, intravenous, Once in imaging  [3] heparin, 0-4,500 Units/hr  [4] PRN medications: dextrose, dextrose, glucagon, glucagon, hydrALAZINE, morphine, oxyCODONE-acetaminophen    "

## 2025-05-18 VITALS
SYSTOLIC BLOOD PRESSURE: 160 MMHG | WEIGHT: 230 LBS | HEIGHT: 70 IN | OXYGEN SATURATION: 93 % | HEART RATE: 71 BPM | RESPIRATION RATE: 16 BRPM | DIASTOLIC BLOOD PRESSURE: 100 MMHG | BODY MASS INDEX: 32.93 KG/M2 | TEMPERATURE: 97.2 F

## 2025-05-18 LAB
ALBUMIN SERPL BCP-MCNC: 3.1 G/DL (ref 3.4–5)
ANION GAP SERPL CALC-SCNC: 12 MMOL/L (ref 10–20)
BUN SERPL-MCNC: 25 MG/DL (ref 6–23)
CALCIUM SERPL-MCNC: 8.2 MG/DL (ref 8.6–10.3)
CHLORIDE SERPL-SCNC: 104 MMOL/L (ref 98–107)
CO2 SERPL-SCNC: 29 MMOL/L (ref 21–32)
CREAT SERPL-MCNC: 2.01 MG/DL (ref 0.5–1.3)
EGFRCR SERPLBLD CKD-EPI 2021: 32 ML/MIN/1.73M*2
ERYTHROCYTE [DISTWIDTH] IN BLOOD BY AUTOMATED COUNT: 13.5 % (ref 11.5–14.5)
GLUCOSE BLD MANUAL STRIP-MCNC: 109 MG/DL (ref 74–99)
GLUCOSE BLD MANUAL STRIP-MCNC: 136 MG/DL (ref 74–99)
GLUCOSE BLD MANUAL STRIP-MCNC: 93 MG/DL (ref 74–99)
GLUCOSE BLD MANUAL STRIP-MCNC: 96 MG/DL (ref 74–99)
GLUCOSE SERPL-MCNC: 92 MG/DL (ref 74–99)
HCT VFR BLD AUTO: 39.9 % (ref 41–52)
HGB BLD-MCNC: 12.8 G/DL (ref 13.5–17.5)
MCH RBC QN AUTO: 29.8 PG (ref 26–34)
MCHC RBC AUTO-ENTMCNC: 32.1 G/DL (ref 32–36)
MCV RBC AUTO: 93 FL (ref 80–100)
NRBC BLD-RTO: 0 /100 WBCS (ref 0–0)
PHOSPHATE SERPL-MCNC: 3.1 MG/DL (ref 2.5–4.9)
PLATELET # BLD AUTO: 222 X10*3/UL (ref 150–450)
POTASSIUM SERPL-SCNC: 4 MMOL/L (ref 3.5–5.3)
RBC # BLD AUTO: 4.29 X10*6/UL (ref 4.5–5.9)
SODIUM SERPL-SCNC: 141 MMOL/L (ref 136–145)
UFH PPP CHRO-ACNC: 0.8 IU/ML (ref ?–1.1)
UFH PPP CHRO-ACNC: 0.9 IU/ML (ref ?–1.1)
WBC # BLD AUTO: 7.1 X10*3/UL (ref 4.4–11.3)

## 2025-05-18 PROCEDURE — 36415 COLL VENOUS BLD VENIPUNCTURE: CPT | Performed by: STUDENT IN AN ORGANIZED HEALTH CARE EDUCATION/TRAINING PROGRAM

## 2025-05-18 PROCEDURE — 2500000005 HC RX 250 GENERAL PHARMACY W/O HCPCS: Performed by: STUDENT IN AN ORGANIZED HEALTH CARE EDUCATION/TRAINING PROGRAM

## 2025-05-18 PROCEDURE — 80069 RENAL FUNCTION PANEL: CPT | Performed by: INTERNAL MEDICINE

## 2025-05-18 PROCEDURE — 82947 ASSAY GLUCOSE BLOOD QUANT: CPT

## 2025-05-18 PROCEDURE — 85520 HEPARIN ASSAY: CPT | Performed by: STUDENT IN AN ORGANIZED HEALTH CARE EDUCATION/TRAINING PROGRAM

## 2025-05-18 PROCEDURE — 2500000001 HC RX 250 WO HCPCS SELF ADMINISTERED DRUGS (ALT 637 FOR MEDICARE OP): Performed by: INTERNAL MEDICINE

## 2025-05-18 PROCEDURE — 2500000001 HC RX 250 WO HCPCS SELF ADMINISTERED DRUGS (ALT 637 FOR MEDICARE OP): Performed by: STUDENT IN AN ORGANIZED HEALTH CARE EDUCATION/TRAINING PROGRAM

## 2025-05-18 PROCEDURE — 85027 COMPLETE CBC AUTOMATED: CPT | Performed by: STUDENT IN AN ORGANIZED HEALTH CARE EDUCATION/TRAINING PROGRAM

## 2025-05-18 PROCEDURE — 2500000002 HC RX 250 W HCPCS SELF ADMINISTERED DRUGS (ALT 637 FOR MEDICARE OP, ALT 636 FOR OP/ED): Performed by: STUDENT IN AN ORGANIZED HEALTH CARE EDUCATION/TRAINING PROGRAM

## 2025-05-18 PROCEDURE — 1200000002 HC GENERAL ROOM WITH TELEMETRY DAILY

## 2025-05-18 PROCEDURE — 2500000004 HC RX 250 GENERAL PHARMACY W/ HCPCS (ALT 636 FOR OP/ED): Mod: JZ | Performed by: STUDENT IN AN ORGANIZED HEALTH CARE EDUCATION/TRAINING PROGRAM

## 2025-05-18 RX ADMIN — INSULIN GLARGINE 10 UNITS: 100 INJECTION, SOLUTION SUBCUTANEOUS at 20:59

## 2025-05-18 RX ADMIN — HYDRALAZINE HYDROCHLORIDE 5 MG: 20 INJECTION INTRAMUSCULAR; INTRAVENOUS at 06:56

## 2025-05-18 RX ADMIN — LIDOCAINE 4% 1 PATCH: 40 PATCH TOPICAL at 08:31

## 2025-05-18 RX ADMIN — ACETAMINOPHEN 650 MG: 325 TABLET ORAL at 08:17

## 2025-05-18 RX ADMIN — ASPIRIN 81 MG CHEWABLE TABLET 81 MG: 81 TABLET CHEWABLE at 08:17

## 2025-05-18 RX ADMIN — ACETAMINOPHEN 650 MG: 325 TABLET ORAL at 15:38

## 2025-05-18 RX ADMIN — ACETAMINOPHEN 650 MG: 325 TABLET ORAL at 20:59

## 2025-05-18 RX ADMIN — METOPROLOL TARTRATE 25 MG: 25 TABLET, FILM COATED ORAL at 08:26

## 2025-05-18 RX ADMIN — APIXABAN 2.5 MG: 2.5 TABLET, FILM COATED ORAL at 10:36

## 2025-05-18 RX ADMIN — ISOSORBIDE MONONITRATE 30 MG: 30 TABLET, EXTENDED RELEASE ORAL at 08:26

## 2025-05-18 RX ADMIN — METOPROLOL TARTRATE 25 MG: 25 TABLET, FILM COATED ORAL at 20:59

## 2025-05-18 RX ADMIN — ATORVASTATIN CALCIUM 80 MG: 80 TABLET, FILM COATED ORAL at 08:17

## 2025-05-18 ASSESSMENT — COGNITIVE AND FUNCTIONAL STATUS - GENERAL
STANDING UP FROM CHAIR USING ARMS: A LOT
STANDING UP FROM CHAIR USING ARMS: A LOT
DRESSING REGULAR LOWER BODY CLOTHING: A LOT
MOBILITY SCORE: 15
TOILETING: A LITTLE
TOILETING: A LITTLE
DAILY ACTIVITIY SCORE: 19
MOBILITY SCORE: 15
CLIMB 3 TO 5 STEPS WITH RAILING: A LOT
MOVING FROM LYING ON BACK TO SITTING ON SIDE OF FLAT BED WITH BEDRAILS: A LITTLE
HELP NEEDED FOR BATHING: A LITTLE
MOVING TO AND FROM BED TO CHAIR: A LITTLE
WALKING IN HOSPITAL ROOM: A LOT
TURNING FROM BACK TO SIDE WHILE IN FLAT BAD: A LITTLE
MOVING FROM LYING ON BACK TO SITTING ON SIDE OF FLAT BED WITH BEDRAILS: A LITTLE
DRESSING REGULAR LOWER BODY CLOTHING: A LOT
TURNING FROM BACK TO SIDE WHILE IN FLAT BAD: A LITTLE
DAILY ACTIVITIY SCORE: 19
WALKING IN HOSPITAL ROOM: A LOT
DRESSING REGULAR UPPER BODY CLOTHING: A LITTLE
DRESSING REGULAR UPPER BODY CLOTHING: A LITTLE
MOVING TO AND FROM BED TO CHAIR: A LITTLE
CLIMB 3 TO 5 STEPS WITH RAILING: A LOT
HELP NEEDED FOR BATHING: A LITTLE

## 2025-05-18 ASSESSMENT — PAIN - FUNCTIONAL ASSESSMENT: PAIN_FUNCTIONAL_ASSESSMENT: 0-10

## 2025-05-18 ASSESSMENT — PAIN SCALES - GENERAL
PAINLEVEL_OUTOF10: 8
PAINLEVEL_OUTOF10: 0 - NO PAIN
PAINLEVEL_OUTOF10: 2

## 2025-05-18 ASSESSMENT — PAIN DESCRIPTION - LOCATION: LOCATION: ABDOMEN

## 2025-05-18 NOTE — PROGRESS NOTES
Patient Name: Dennis Galo   YOB: 1940    Subjective:  Pt states his pain in his right hip is about 2/10 at rest, 6/10 with lifting it up and down.  We talked about his prostate cancer and that it has spread to bone.  I also talked to him about his newly found atrial fibrillation and the stroke risks associated with this diagnosis.     Objective:    Vitals:    05/17/25 2344 05/18/25 0358 05/18/25 0826 05/18/25 0826   BP: (!) 160/103 (!) 184/103 175/88 175/88   BP Location:       Patient Position:       Pulse:  76     Resp:       Temp: 36.4 °C (97.5 °F) 36.3 °C (97.3 °F)     TempSrc:       SpO2: 93% 93%     Weight:       Height:           Physical Exam:    GEN: Awake and alert. Not in acute distress.   HEENT: Normocephalic and atraumatic.  Mucous membranes moist.  Clear sclera, PERRL, EOMI.  CARDIO: Irregularly irregular  RESP: Clear to auscultation b/l. No wheezes, rales or rhonchi. Good respiratory effort.   ABD: +BS x4, soft, non-tender. Not distended. No rebound or guarding.  SP catheter in place with leg bag, no hematuria  MSK: 2+ bilateral pitting edema, LLE with fluid filled bulla,  Right hip pain  NEURO: A&O X 3. CN II-XII are grossly intact. No focal deficits.   SKIN: Warm and dry, no lesions, no rashes.  PSYCH: Appropriate mood and affect, no hallucinations.     Scheduled medications  Scheduled Medications[1]  Continuous medications  Continuous Medications[2]  PRN medications  PRN Medications[3]     Assessment and Plan:  Dennis Galo is a 85 y.o. 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  PT and OT evaluation, [facility] 16/17.  CT right hip reviewed, no acute fractures but there are blastic lesions in  right ischium, pubic bones and ileum.  Will recommend referral to oncology at discharge.  Treating LAURA on CKD 3.  Creatinine 2.4 at admission, baseline is 1.6.  Holding Torsemide for now, monitor chemistry panel and urine output.   New AF, currently rate controlled.  On heparin infusion and monitoring for hematuria.  I discussed with patient about stroke risks a/w A-fib.  He states he does not fall much, this was the first time falling.  Being on longterm AC increases chances of bleeding, with his history of hematuria I discussed with him this will need to be closely monitored.  He is in agreement to take a AC for now but states he may change his mind later.  Will transition off heparin infusion, start adjusted dosing Eliquis 2.5mg BID.  Cardiology consulted.  Rate controlled on Lopressor.  Podiatry consulted for lower extremity wound care.  Up in chair with meals, ambulate hallway with assistance.  Younger brother Kenny [phone].    I spent 30 minutes in the professional and overall care of this patient. More than 50% of this time was spent examining and counseling patient, reviewing plan of care, and coordinating medical care.    Rafael Balbuena DO  Senior Attending Physician  [facility]  Clinical Instructor             [1] acetaminophen, 650 mg, oral, q6h during day  apixaban, 2.5 mg, oral, q12h  aspirin, 81 mg, oral, Daily  atorvastatin, 80 mg, oral, Daily  insulin glargine, 10 Units, subcutaneous, Nightly  insulin lispro, 0-5 Units, subcutaneous, TID AC  isosorbide mononitrate ER, 30 mg, oral, Daily  lidocaine, 1 patch, transdermal, Daily  metoprolol tartrate, 25 mg, oral, BID  perflutren lipid microspheres, 0.5-10 mL of dilution, intravenous, Once in imaging    [2]    [3] PRN medications: dextrose, dextrose, glucagon, glucagon, hydrALAZINE, morphine, oxyCODONE-acetaminophen

## 2025-05-18 NOTE — CARE PLAN
The patient's goals for the shift include      The clinical goals for the shift include Maintain safety and comfort    Over the shift, the patient did not make progress toward the following goals. Barriers to progression include time for wound healing.

## 2025-05-18 NOTE — CARE PLAN
The patient's goals for the shift include      The clinical goals for the shift include Maintain safety and comfort      Problem: Safety - Adult  Goal: Free from fall injury  Outcome: Progressing  Flowsheets (Taken 5/18/2025 2514)  Free from fall injury: Instruct family/caregiver on patient safety     Problem: Pain - Adult  Goal: Verbalizes/displays adequate comfort level or baseline comfort level  Outcome: Progressing

## 2025-05-18 NOTE — CARE PLAN
The patient's goals for the shift include      The clinical goals for the shift include Maintain safety and comfort    Over the shift, the patient did not make progress toward the following goals. Barriers to progression include time for healing of wound.

## 2025-05-18 NOTE — CARE PLAN
The patient's goals for the shift include      The clinical goals for the shift include Maintain safety and comfort    Over the shift, the patient did make progress toward the following goals. Barriers to progression include using walker when ambulating. Recommendations to address these barriers include patient advised to control before getting up to ambulate. .

## 2025-05-18 NOTE — PROGRESS NOTES
Nephrology Consult Progress Note    Dennis Galo is a 85 y.o. male on day 3 of admission presenting with Localized swelling, mass and lump, lower limb, bilateral.      Subjective   No acute events overnight, walks with a walker, frail, claims improved UOP, remains on RA       Objective          Physical Exam    Vitals 24HR  Heart Rate:  [72-88]   Temp:  [36.3 °C (97.3 °F)-36.9 °C (98.4 °F)]   BP: (150-184)/()   SpO2:  [93 %]        NAD, sick looking, pale on RA  Decreased AEBL  Irregular RR  Abd distended, + BS  Edema, chronic skin changes, dressings on           I&O 24HR    Intake/Output Summary (Last 24 hours) at 5/18/2025 1346  Last data filed at 5/17/2025 1711  Gross per 24 hour   Intake --   Output 500 ml   Net -500 ml         Medications  Prescriptions Prior to Admission[1]   Scheduled medications  Scheduled Medications[2]  Continuous medications  Continuous Medications[3]  PRN medications  PRN Medications[4]    Relevant Results      Admission on 05/15/2025   Component Date Value Ref Range Status    WBC 05/15/2025 6.7  4.4 - 11.3 x10*3/uL Final    nRBC 05/15/2025 0.0  0.0 - 0.0 /100 WBCs Final    RBC 05/15/2025 4.38 (L)  4.50 - 5.90 x10*6/uL Final    Hemoglobin 05/15/2025 12.8 (L)  13.5 - 17.5 g/dL Final    Hematocrit 05/15/2025 40.7 (L)  41.0 - 52.0 % Final    MCV 05/15/2025 93  80 - 100 fL Final    MCH 05/15/2025 29.2  26.0 - 34.0 pg Final    MCHC 05/15/2025 31.4 (L)  32.0 - 36.0 g/dL Final    RDW 05/15/2025 13.4  11.5 - 14.5 % Final    Platelets 05/15/2025 223  150 - 450 x10*3/uL Final    Neutrophils % 05/15/2025 74.8  40.0 - 80.0 % Final    Immature Granulocytes %, Automated 05/15/2025 0.5  0.0 - 0.9 % Final    Immature Granulocyte Count (IG) includes promyelocytes, myelocytes and metamyelocytes but does not include bands. Percent differential counts (%) should be interpreted in the context of the absolute cell counts (cells/UL).    Lymphocytes % 05/15/2025 12.3  13.0 - 44.0 % Final    Monocytes  % 05/15/2025 8.0  2.0 - 10.0 % Final    Eosinophils % 05/15/2025 3.3  0.0 - 6.0 % Final    Basophils % 05/15/2025 1.1  0.0 - 2.0 % Final    Neutrophils Absolute 05/15/2025 4.98  1.60 - 5.50 x10*3/uL Final    Percent differential counts (%) should be interpreted in the context of the absolute cell counts (cells/uL).    Immature Granulocytes Absolute, Au* 05/15/2025 0.03  0.00 - 0.50 x10*3/uL Final    Lymphocytes Absolute 05/15/2025 0.82  0.80 - 3.00 x10*3/uL Final    Monocytes Absolute 05/15/2025 0.53  0.05 - 0.80 x10*3/uL Final    Eosinophils Absolute 05/15/2025 0.22  0.00 - 0.40 x10*3/uL Final    Basophils Absolute 05/15/2025 0.07  0.00 - 0.10 x10*3/uL Final    Glucose 05/15/2025 100 (H)  74 - 99 mg/dL Final    Sodium 05/15/2025 141  136 - 145 mmol/L Final    Potassium 05/15/2025 3.6  3.5 - 5.3 mmol/L Final    Chloride 05/15/2025 103  98 - 107 mmol/L Final    Bicarbonate 05/15/2025 28  21 - 32 mmol/L Final    Anion Gap 05/15/2025 14  10 - 20 mmol/L Final    Urea Nitrogen 05/15/2025 28 (H)  6 - 23 mg/dL Final    Creatinine 05/15/2025 2.43 (H)  0.50 - 1.30 mg/dL Final    eGFR 05/15/2025 26 (L)  >60 mL/min/1.73m*2 Final    Calculations of estimated GFR are performed using the 2021 CKD-EPI Study Refit equation without the race variable for the IDMS-Traceable creatinine methods.  https://jasn.asnjournals.org/content/early/2021/09/22/ASN.5724792436    Calcium 05/15/2025 8.6  8.6 - 10.3 mg/dL Final    Albumin 05/15/2025 3.4  3.4 - 5.0 g/dL Final    Alkaline Phosphatase 05/15/2025 207 (H)  33 - 136 U/L Final    Total Protein 05/15/2025 6.6  6.4 - 8.2 g/dL Final    AST 05/15/2025 13  9 - 39 U/L Final    Bilirubin, Total 05/15/2025 1.1  0.0 - 1.2 mg/dL Final    ALT 05/15/2025 8 (L)  10 - 52 U/L Final    Patients treated with Sulfasalazine may generate falsely decreased results for ALT.    Magnesium 05/15/2025 1.77  1.60 - 2.40 mg/dL Final    Troponin I, High Sensitivity 05/15/2025 15  0 - 20 ng/L Final    Ventricular Rate  05/15/2025 81  BPM Preliminary    Atrial Rate 05/15/2025 0  BPM Preliminary    [place] Interval 05/15/2025 210  ms Preliminary    QRS Duration 05/15/2025 108  ms Preliminary    QT Interval 05/15/2025 402  ms Preliminary    QTC Calculation(Bazett) 05/15/2025 467  ms Preliminary    R Axis 05/15/2025 -88  degrees Preliminary    T Axis 05/15/2025 60  degrees Preliminary    QRS Count 05/15/2025 14  beats Preliminary    Q Onset 05/15/2025 253  ms Preliminary    T Offset 05/15/2025 454  ms Preliminary    QTC Fredericia 05/15/2025 444  ms Preliminary    BNP 05/15/2025 380 (H)  0 - 99 pg/mL Final    D-Dimer Non VTE, Quant (ng/mL FEU) 05/15/2025 1,934 (H)  <=500 ng/mL FEU Final    POCT Glucose 05/15/2025 222 (H)  74 - 99 mg/dL Final    WBC 05/16/2025 6.8  4.4 - 11.3 x10*3/uL Final    nRBC 05/16/2025 0.0  0.0 - 0.0 /100 WBCs Final    RBC 05/16/2025 4.05 (L)  4.50 - 5.90 x10*6/uL Final    Hemoglobin 05/16/2025 11.8 (L)  13.5 - 17.5 g/dL Final    Hematocrit 05/16/2025 37.7 (L)  41.0 - 52.0 % Final    MCV 05/16/2025 93  80 - 100 fL Final    MCH 05/16/2025 29.1  26.0 - 34.0 pg Final    MCHC 05/16/2025 31.3 (L)  32.0 - 36.0 g/dL Final    RDW 05/16/2025 13.5  11.5 - 14.5 % Final    Platelets 05/16/2025 231  150 - 450 x10*3/uL Final    Glucose 05/16/2025 76  74 - 99 mg/dL Final    Sodium 05/16/2025 140  136 - 145 mmol/L Final    Potassium 05/16/2025 3.4 (L)  3.5 - 5.3 mmol/L Final    Chloride 05/16/2025 105  98 - 107 mmol/L Final    Bicarbonate 05/16/2025 29  21 - 32 mmol/L Final    Anion Gap 05/16/2025 9 (L)  10 - 20 mmol/L Final    Urea Nitrogen 05/16/2025 27 (H)  6 - 23 mg/dL Final    Creatinine 05/16/2025 2.17 (H)  0.50 - 1.30 mg/dL Final    eGFR 05/16/2025 29 (L)  >60 mL/min/1.73m*2 Final    Calculations of estimated GFR are performed using the 2021 CKD-EPI Study Refit equation without the race variable for the IDMS-Traceable creatinine methods.  https://jasn.asnjournals.org/content/early/2021/09/22/ASN.9192919818    Calcium  05/16/2025 8.2 (L)  8.6 - 10.3 mg/dL Final    POCT Glucose 05/15/2025 177 (H)  74 - 99 mg/dL Final    POCT Glucose 05/16/2025 81  74 - 99 mg/dL Final    POCT Glucose 05/16/2025 139 (H)  74 - 99 mg/dL Final    AV pk erica 05/16/2025 1.05  m/s Final    AV mn grad 05/16/2025 3  mmHg Final    LVOT diam 05/16/2025 2.00  cm Final    LA vol index A/L 05/16/2025 33.0  ml/m2 Final    Tricuspid annular plane systolic e* 05/16/2025 1.5  cm Final    LV EF 05/16/2025 48  % Final    RV free wall pk S' 05/16/2025 6.31  cm/s Final    LVIDd 05/16/2025 3.80  cm Final    Aortic Valve Area by Continuity of* 05/16/2025 2.73  cm2 Final    Aortic Valve Area by Continuity of* 05/16/2025 2.60  cm2 Final    AV pk grad 05/16/2025 4  mmHg Final    LV A4C EF 05/16/2025 50.0   Final    POCT Glucose 05/16/2025 131 (H)  74 - 99 mg/dL Final    Glucose 05/17/2025 69 (L)  74 - 99 mg/dL Final    Sodium 05/17/2025 141  136 - 145 mmol/L Final    Potassium 05/17/2025 3.5  3.5 - 5.3 mmol/L Final    Chloride 05/17/2025 104  98 - 107 mmol/L Final    Bicarbonate 05/17/2025 27  21 - 32 mmol/L Final    Anion Gap 05/17/2025 14  10 - 20 mmol/L Final    Urea Nitrogen 05/17/2025 24 (H)  6 - 23 mg/dL Final    Creatinine 05/17/2025 1.95 (H)  0.50 - 1.30 mg/dL Final    eGFR 05/17/2025 34 (L)  >60 mL/min/1.73m*2 Final    Calculations of estimated GFR are performed using the 2021 CKD-EPI Study Refit equation without the race variable for the IDMS-Traceable creatinine methods.  https://jasn.asnjournals.org/content/early/2021/09/22/ASN.9620975791    Calcium 05/17/2025 8.3 (L)  8.6 - 10.3 mg/dL Final    Phosphorus 05/17/2025 2.6  2.5 - 4.9 mg/dL Final    Albumin 05/17/2025 3.1 (L)  3.4 - 5.0 g/dL Final    WBC 05/17/2025 6.6  4.4 - 11.3 x10*3/uL Final    nRBC 05/17/2025 0.0  0.0 - 0.0 /100 WBCs Final    RBC 05/17/2025 4.27 (L)  4.50 - 5.90 x10*6/uL Final    Hemoglobin 05/17/2025 12.5 (L)  13.5 - 17.5 g/dL Final    Hematocrit 05/17/2025 39.7 (L)  41.0 - 52.0 % Final    MCV  05/17/2025 93  80 - 100 fL Final    MCH 05/17/2025 29.3  26.0 - 34.0 pg Final    MCHC 05/17/2025 31.5 (L)  32.0 - 36.0 g/dL Final    RDW 05/17/2025 13.4  11.5 - 14.5 % Final    Platelets 05/17/2025 231  150 - 450 x10*3/uL Final    Magnesium 05/17/2025 1.81  1.60 - 2.40 mg/dL Final    POCT Glucose 05/16/2025 150 (H)  74 - 99 mg/dL Final    POCT Glucose 05/17/2025 71 (L)  74 - 99 mg/dL Final    POCT Glucose 05/17/2025 120 (H)  74 - 99 mg/dL Final    ABO TYPE 05/17/2025 O   Final    Rh TYPE 05/17/2025 POS   Final    ANTIBODY SCREEN 05/17/2025 NEG   Final    aPTT 05/17/2025 34  26 - 36 seconds Final    POCT Glucose 05/17/2025 170 (H)  74 - 99 mg/dL Final    Heparin Unfractionated 05/17/2025 1.6 (HH)  See Comment Below for Therapeutic Ranges IU/mL Final    Glucose 05/18/2025 92  74 - 99 mg/dL Final    Sodium 05/18/2025 141  136 - 145 mmol/L Final    Potassium 05/18/2025 4.0  3.5 - 5.3 mmol/L Final    Chloride 05/18/2025 104  98 - 107 mmol/L Final    Bicarbonate 05/18/2025 29  21 - 32 mmol/L Final    Anion Gap 05/18/2025 12  10 - 20 mmol/L Final    Urea Nitrogen 05/18/2025 25 (H)  6 - 23 mg/dL Final    Creatinine 05/18/2025 2.01 (H)  0.50 - 1.30 mg/dL Final    eGFR 05/18/2025 32 (L)  >60 mL/min/1.73m*2 Final    Calculations of estimated GFR are performed using the 2021 CKD-EPI Study Refit equation without the race variable for the IDMS-Traceable creatinine methods.  https://jasn.asnjournals.org/content/early/2021/09/22/ASN.2869885122    Calcium 05/18/2025 8.2 (L)  8.6 - 10.3 mg/dL Final    Phosphorus 05/18/2025 3.1  2.5 - 4.9 mg/dL Final    Albumin 05/18/2025 3.1 (L)  3.4 - 5.0 g/dL Final    WBC 05/18/2025 7.1  4.4 - 11.3 x10*3/uL Final    nRBC 05/18/2025 0.0  0.0 - 0.0 /100 WBCs Final    RBC 05/18/2025 4.29 (L)  4.50 - 5.90 x10*6/uL Final    Hemoglobin 05/18/2025 12.8 (L)  13.5 - 17.5 g/dL Final    Hematocrit 05/18/2025 39.9 (L)  41.0 - 52.0 % Final    MCV 05/18/2025 93  80 - 100 fL Final    MCH 05/18/2025 29.8  26.0 -  34.0 pg Final    MCHC 05/18/2025 32.1  32.0 - 36.0 g/dL Final    RDW 05/18/2025 13.5  11.5 - 14.5 % Final    Platelets 05/18/2025 222  150 - 450 x10*3/uL Final    POCT Glucose 05/17/2025 183 (H)  74 - 99 mg/dL Final    Heparin Unfractionated 05/17/2025 1.1 (HH)  See Comment Below for Therapeutic Ranges IU/mL Final    Heparin Unfractionated 05/18/2025 0.8  See Comment Below for Therapeutic Ranges IU/mL Final    Heparin Unfractionated 05/18/2025 0.9  See Comment Below for Therapeutic Ranges IU/mL Final    POCT Glucose 05/18/2025 96  74 - 99 mg/dL Final    POCT Glucose 05/18/2025 109 (H)  74 - 99 mg/dL Final      Imaging  CT hip right wo IV contrast  Result Date: 5/16/2025  Sclerosis most likely due to blastic metastasis of right ischium, pubic bones and ileum as described. Additional findings as above.   Signed by: Romario Nguyen 5/16/2025 9:03 AM Dictation workstation:   [id number]    XR hip right with pelvis when performed 2 or 3 views  Result Date: 5/15/2025  1.  No evidence for acute fracture or dislocation. 2.  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 Signed by Lorenzo May MD      Cardiology, Vascular, and Other Imaging  Transthoracic Echo Complete  Result Date: 5/16/2025   [facility], [address] [place] [address]           Tel [phone] and Fax [phone] TRANSTHORACIC ECHOCARDIOGRAM REPORT  Patient Name:       BEVERLY Petersen Physician:    [id number] Ayush Kaur MD Study Date:         5/16/2025           Ordering Provider:    [id number] VALERI MAN MRN/PID:            [id number]            Fellow: Accession#:         [id number]        Nurse: Date of Birth/Age:  1940 / 85 years Sonographer:          Glo Greer RDCS Gender assigned at  M                   Additional Staff: Birth: Height:              177.00 cm           Admit Date:           5/15/2025 Weight:             104.00 kg           Admission Status:     Inpatient -                                                               Routine BSA / BMI:          2.20 m2 / 33.20     Encounter#:           [id number]                     kg/m2 Blood Pressure:     175/91 mmHg         Department Location:  [address]                                                               [facility] Study Type:    TRANSTHORACIC ECHO (TTE) COMPLETE Diagnosis/ICD: Dyspnea, unspecified-R06.00; Unspecified diastolic (congestive)                heart failure (CHF)-I50.30; Unspecified systolic (congestive)                heart failure (CHF)-I50.20 Indication:    Congestive heart failure, unspecified HF chronicity, unspecified                heart failure type;, Dyspnea CPT Code:      Echo Complete w Full Doppler-16036 Patient History: Pertinent History: PMH diabetes, CHF, CKD, prostate cancer presented to the                    hospital with chief complaint of leg pain. Study Detail: The following Echo studies were performed: 2D, M-Mode, Doppler,               color flow and 3D. Technically challenging study due to prominent               lung artifact. Unable to obtain suprasternal notch view.  PHYSICIAN INTERPRETATION: Left Ventricle: Left ventricular ejection fraction is mildly decreased, by visual estimate at 45-50%. Left venticular wall motion is abnormal. The left ventricular cavity size is decreased. There is severely increased septal and severely increased posterior left ventricular wall thickness. There is left ventricular concentric remodeling. Left ventricular diastolic filling is indeterminate. There is evidence of a moderate apical myocardial infarction. Left Atrium: The left atrial size is moderately dilated. Right Ventricle: The right ventricle is normal in size. There is normal right ventricular global systolic function. Right Atrium: The right atrium is  mildly dilated. Aortic Valve: The aortic valve is trileaflet. The aortic valve dimensionless index is 0.87. There is no evidence of aortic valve regurgitation. The peak instantaneous gradient of the aortic valve is 4 mmHg. The mean gradient of the aortic valve is 3 mmHg. Mitral Valve: The mitral valve is normal in structure. There is trace mitral valve regurgitation. Tricuspid Valve: The tricuspid valve is structurally normal. No evidence of tricuspid regurgitation. The right ventricular systolic pressure is unable to be estimated. Pulmonic Valve: The pulmonic valve is structurally normal. The pulmonic valve regurgitation was not well visualized. Pericardium: There is no pericardial effusion noted. Aorta: The aortic root is normal. There is no dilatation of the ascending aorta. There is no dilatation of the aortic root. In comparison to the previous echocardiogram(s): Compared with study dated 12/11/2023,.  CONCLUSIONS:  1. Left ventricular ejection fraction is mildly decreased, by visual estimate at 45-50%.  2. Abnormal left venticular wall motion.  3. Left ventricular diastolic filling is indeterminate.  4. Left ventricular cavity size is decreased.  5. There is a moderate apical myocardial infarction.  6. There is severely increased septal and severely increased posterior left ventricular wall thickness.  7. There is normal right ventricular global systolic function.  8. The left atrial size is moderately dilated. QUANTITATIVE DATA SUMMARY:  2D MEASUREMENTS:          Normal Ranges: LAs:             4.50 cm  (2.7-4.0cm) IVSd:            1.70 cm  (0.6-1.1cm) LVPWd:           1.60 cm  (0.6-1.1cm) LVIDd:           3.80 cm  (3.9-5.9cm) LVIDs:           2.80 cm LV Mass Index:   115 g/m2 LVEDV Index:     42 ml/m2 LV % FS          26.3 %  LEFT ATRIUM:                  Normal Ranges: LA Vol A4C:        62.8 ml    (22+/-6mL/m2) LA Vol A2C:        80.8 ml LA Vol BP:         72.8 ml LA Vol Index A4C:  28.5ml/m2 LA Vol Index  A2C:  36.6 ml/m2 LA Vol Index BP:   33.0 ml/m2 LA Area A4C:       21.3 cm2 LA Area A2C:       24.7 cm2 LA Major Axis A4C: 6.1 cm LA Major Axis A2C: 6.4 cm LA Volume Index:   33.0 ml/m2  RIGHT ATRIUM:                 Normal Ranges: RA Vol A4C:        44.2 ml    (8.3-19.5ml) RA Vol Index A4C:  20.1 ml/m2 RA Area A4C:       17.9 cm2 RA Major Axis A4C: 6.2 cm  M-MODE MEASUREMENTS:         Normal Ranges: Ao Root:             2.60 cm (2.0-3.7cm) LAs:                 5.00 cm (2.7-4.0cm)  AORTA MEASUREMENTS:         Normal Ranges: Ao Sinus, d:        3.00 cm (2.1-3.5cm) Ao STJ, d:          3.20 cm (1.7-3.4cm) Asc Ao, d:          3.40 cm (2.1-3.4cm)  LV SYSTOLIC FUNCTION:                      Normal Ranges: EF-A4C View:    50 % (>=55%) EF-A2C View:    44 % EF-Biplane:     47 % EF-Visual:      48 % LV EF Reported: 48 %  LV DIASTOLIC FUNCTION:            Normal Ranges: PulmV Sys Pal:         25.00 cm/s PulmV Bishop Pal:        61.20 cm/s PulmV S/D Pal:         0.40  AORTIC VALVE:                     Normal Ranges: AoV Vmax:                1.05 m/s (<=1.7m/s) AoV Peak P.4 mmHg (<20mmHg) AoV Mean PG:             3.0 mmHg (1.7-11.5mmHg) LVOT Max Pal:            0.87 m/s (<=1.1m/s) AoV VTI:                 18.40 cm (18-25cm) LVOT VTI:                16.00 cm LVOT Diameter:           2.00 cm  (1.8-2.4cm) AoV Area, VTI:           2.73 cm2 (2.5-5.5cm2) AoV Area,Vmax:           2.60 cm2 (2.5-4.5cm2) AoV Dimensionless Index: 0.87  RIGHT VENTRICLE: RV Basal 2.80 cm RV Mid   2.70 cm RV Major 9.4 cm TAPSE:   14.6 mm RV s'    0.06 m/s  TRICUSPID VALVE/RVSP:         Normal Ranges: IVC Diam:             2.33 cm  PULMONIC VALVE:          Normal Ranges: PV Max Pal:     0.7 m/s  (0.6-0.9m/s) PV Max P.7 mmHg  PULMONARY VEINS: PulmV Bishop Pal: 61.20 cm/s PulmV S/D Pal:  0.40 PulmV Sys Pal:  25.00 cm/s  [id number] Ayush Kaur MD Electronically signed on 2025 at 2:38:27 PM  ** Final **     ECG 12 lead  Result Date:  5/16/2025  Atrial fibrillation Inferior infarct, old Abnormal lateral Q waves Anterior infarct, old    Vascular US lower extremity venous duplex bilateral  Result Date: 5/15/2025           [place] [id number] Tel [phone] and Fax [phone]  Vascular Lab Report [facility] US LOWER EXTREMITY VENOUS DUPLEX BILATERAL  Patient Name:      BEVERLY KO JENNY   Reading Physician:  [id number] Danette Leahy MD Study Date:        5/15/2025           Ordering Physician: [id number] HEDRICK MRN/PID:           [id number]            Technologist:       Lucía Ledezma RVT Accession#:        [id number]        Technologist 2: Date of Birth/Age: 1940 / 85 years Encounter#:         [id number] Gender:            M Admission Status:  Emergency           Location Performed: [facility]  Diagnosis/ICD: Localized swelling, bilateral lower extremities-R22.43 CPT Codes:     92073 Peripheral venous duplex scan for DVT complete  CONCLUSIONS: Right Lower Venous: No evidence of acute deep vein thrombus visualized in the right lower extremity. Left Lower Venous: No evidence of acute deep vein thrombus visualized in the left lower extremity. Cannot rule out thrombus in non-visualized posterior tibial and peroneal veins due to edema.  Additional Findings: Pulsatile flow throughout bilateral venous system suggesting volume overload or elevated cardiac filling pressures.  Imaging & Doppler Findings:  Right                 Compressible Thrombus   Flow Distal External Iliac     Yes        None   Pulsatile CFV                       Yes        None   Pulsatile PFV                       Yes        None FV Proximal               Yes        None   Pulsatile FV Mid                    Yes        None FV Distal                 Yes        None Popliteal                 Yes        None   Pulsatile Peroneal                  Yes        None PTV                       Yes        None  Left                  Compress Thrombus    Flow Distal External Iliac   Yes      None   Pulsatile CFV                     Yes      None   Pulsatile PFV                     Yes      None FV Proximal             Yes      None   Pulsatile FV Mid                  Yes      None FV Distal               Yes      None Popliteal               Yes      None   Pulsatile  [id number] Danette Leahy MD Electronically signed by [id number] Danette Leahy MD on 5/15/2025 at 11:18:36 AM  ** Final **           ASSESSMENT AND PLAN    LAURA on CKD, baseline creatinine 1.5-1.6, was 2.4 on admission, improving  Underlying CHF EF 45%, Afib  Volume overload on torsemide 60 mg every day at home  BL renal cysts  Chronic liu for chronic retention, underlying metastatic prostate ca on leupron      Plan  - continue to hold torsemide, use prn, claims making more urine, remains on RA  - daily k po  - please record UOP  - chronic edema  - hematuria on chronic liu  - avoid hypotension and nephrotoxins, give lopressor with holding parameters  - trend HH    MARS reviewed, dose for GFR<30    Thank you for the opportunity to assist in the care of this patient, please call with questions  Nichole Fowler MD PhD           [1]   Medications Prior to Admission   Medication Sig Dispense Refill Last Dose/Taking    ammonium lactate (Amlactin) 12 % cream Apply 1 Application topically if needed for dry skin.   5/14/2025    aspirin 81 mg chewable tablet Chew 1 tablet (81 mg) once daily.   5/14/2025 Morning    atorvastatin (Lipitor) 80 mg tablet Take 1 tablet (80 mg) by mouth once daily. (Patient taking differently: Take 1 tablet (80 mg) by mouth once daily at bedtime.) 90 tablet 3 5/14/2025    blood sugar diagnostic (OneTouch Ultra Test) strip USE ONCE DAILY AS DIRECTED 100 strip 1 5/14/2025    FreeStyle glucose monitoring (OneTouch Ultra2 Meter) kit As directed 1 each 0 5/14/2025    isosorbide mononitrate ER (Imdur) 30 mg 24 hr tablet Take 1 tablet (30 mg) by mouth once daily. 90 tablet 3 5/14/2025 Morning    lancets  "(OneTouch UltraSoft Lancets) misc Use once daily 100 each 1 5/14/2025    metoprolol tartrate (Lopressor) 25 mg tablet TAKE 1 TABLET BY MOUTH TWICE A DAY (Patient taking differently: Take 1 tablet (25 mg) by mouth 2 times a day.) 180 tablet 1 5/14/2025    nystatin (Mycostatin) 100,000 unit/gram powder Apply 1 Application topically 2 times a day. 60 g 0 5/14/2025    pen needle, diabetic (BD Arelis 2nd Gen Pen Needle) 32 gauge x 5/32\" needle USE ONCE DAILY 100 each 2 5/14/2025    torsemide (Demadex) 20 mg tablet TAKE 3 TABLETS (60 MG) BY MOUTH ONCE DAILY. 270 tablet 1 5/14/2025 Morning    dulaglutide (Trulicity) 3 mg/0.5 mL injection INJECT 3 MG SUBCUTANEOUSLY WEEKLY 6 mL 1     gabapentin (Neurontin) 100 mg capsule Take 1 capsule (100 mg) by mouth 3 times a day. (Patient not taking: Reported on 5/15/2025)   Not Taking    insulin glargine (Lantus Solostar U-100 Insulin) 100 unit/mL (3 mL) pen INJECT 24 UNITS UNDER THE SKIN ONCE DAILY IN THE MORNING. 15 mL 1     leuprolide acetate (LEUPROLIDE, BULK, MISC) Inject 5 mg as directed every 6 months. Lupron 5 MG- by injection into muscle every 6 months  (Patient not taking: Reported on 5/15/2025)   Not Taking    SITagliptin phosphate (Januvia) 100 mg tablet Take 1 tablet (100 mg) by mouth once daily. (Patient not taking: Reported on 5/15/2025)   Not Taking   [2] acetaminophen, 650 mg, oral, q6h during day  apixaban, 2.5 mg, oral, q12h  aspirin, 81 mg, oral, Daily  atorvastatin, 80 mg, oral, Daily  insulin glargine, 10 Units, subcutaneous, Nightly  insulin lispro, 0-5 Units, subcutaneous, TID AC  isosorbide mononitrate ER, 30 mg, oral, Daily  lidocaine, 1 patch, transdermal, Daily  metoprolol tartrate, 25 mg, oral, BID  perflutren lipid microspheres, 0.5-10 mL of dilution, intravenous, Once in imaging     [3]    [4] PRN medications: dextrose, dextrose, glucagon, glucagon, hydrALAZINE, morphine, oxyCODONE-acetaminophen    "

## 2025-05-19 ENCOUNTER — APPOINTMENT (OUTPATIENT)
Dept: VASCULAR MEDICINE | Facility: HOSPITAL | Age: 85
DRG: 683 | End: 2025-05-19
Payer: MEDICARE

## 2025-05-19 LAB
ALBUMIN SERPL BCP-MCNC: 3.2 G/DL (ref 3.4–5)
ANION GAP SERPL CALC-SCNC: 15 MMOL/L (ref 10–20)
ATRIAL RATE: 108 BPM
BUN SERPL-MCNC: 24 MG/DL (ref 6–23)
CALCIUM SERPL-MCNC: 8.3 MG/DL (ref 8.6–10.3)
CHLORIDE SERPL-SCNC: 106 MMOL/L (ref 98–107)
CO2 SERPL-SCNC: 23 MMOL/L (ref 21–32)
CREAT SERPL-MCNC: 1.83 MG/DL (ref 0.5–1.3)
EGFRCR SERPLBLD CKD-EPI 2021: 36 ML/MIN/1.73M*2
ERYTHROCYTE [DISTWIDTH] IN BLOOD BY AUTOMATED COUNT: 13.5 % (ref 11.5–14.5)
GLUCOSE BLD MANUAL STRIP-MCNC: 114 MG/DL (ref 74–99)
GLUCOSE BLD MANUAL STRIP-MCNC: 143 MG/DL (ref 74–99)
GLUCOSE BLD MANUAL STRIP-MCNC: 82 MG/DL (ref 74–99)
GLUCOSE BLD MANUAL STRIP-MCNC: 88 MG/DL (ref 74–99)
GLUCOSE SERPL-MCNC: 84 MG/DL (ref 74–99)
HCT VFR BLD AUTO: 38.9 % (ref 41–52)
HGB BLD-MCNC: 12.5 G/DL (ref 13.5–17.5)
HOLD SPECIMEN: 293
MCH RBC QN AUTO: 29.1 PG (ref 26–34)
MCHC RBC AUTO-ENTMCNC: 32.1 G/DL (ref 32–36)
MCV RBC AUTO: 91 FL (ref 80–100)
NRBC BLD-RTO: 0 /100 WBCS (ref 0–0)
PHOSPHATE SERPL-MCNC: 3 MG/DL (ref 2.5–4.9)
PLATELET # BLD AUTO: 240 X10*3/UL (ref 150–450)
POTASSIUM SERPL-SCNC: 3.6 MMOL/L (ref 3.5–5.3)
Q ONSET: 214 MS
QRS COUNT: 12 BEATS
QRS DURATION: 100 MS
QT INTERVAL: 414 MS
QTC CALCULATION(BAZETT): 459 MS
QTC FREDERICIA: 444 MS
R AXIS: -51 DEGREES
RBC # BLD AUTO: 4.29 X10*6/UL (ref 4.5–5.9)
SODIUM SERPL-SCNC: 140 MMOL/L (ref 136–145)
T AXIS: 51 DEGREES
T OFFSET: 421 MS
VENTRICULAR RATE: 74 BPM
WBC # BLD AUTO: 6.5 X10*3/UL (ref 4.4–11.3)

## 2025-05-19 PROCEDURE — 1200000002 HC GENERAL ROOM WITH TELEMETRY DAILY

## 2025-05-19 PROCEDURE — 2500000001 HC RX 250 WO HCPCS SELF ADMINISTERED DRUGS (ALT 637 FOR MEDICARE OP): Performed by: INTERNAL MEDICINE

## 2025-05-19 PROCEDURE — 93922 UPR/L XTREMITY ART 2 LEVELS: CPT | Performed by: SURGERY

## 2025-05-19 PROCEDURE — 2500000001 HC RX 250 WO HCPCS SELF ADMINISTERED DRUGS (ALT 637 FOR MEDICARE OP): Performed by: STUDENT IN AN ORGANIZED HEALTH CARE EDUCATION/TRAINING PROGRAM

## 2025-05-19 PROCEDURE — 84100 ASSAY OF PHOSPHORUS: CPT | Performed by: INTERNAL MEDICINE

## 2025-05-19 PROCEDURE — 36415 COLL VENOUS BLD VENIPUNCTURE: CPT | Performed by: INTERNAL MEDICINE

## 2025-05-19 PROCEDURE — 85027 COMPLETE CBC AUTOMATED: CPT | Performed by: INTERNAL MEDICINE

## 2025-05-19 PROCEDURE — 2500000005 HC RX 250 GENERAL PHARMACY W/O HCPCS: Performed by: STUDENT IN AN ORGANIZED HEALTH CARE EDUCATION/TRAINING PROGRAM

## 2025-05-19 PROCEDURE — 2500000002 HC RX 250 W HCPCS SELF ADMINISTERED DRUGS (ALT 637 FOR MEDICARE OP, ALT 636 FOR OP/ED): Performed by: STUDENT IN AN ORGANIZED HEALTH CARE EDUCATION/TRAINING PROGRAM

## 2025-05-19 PROCEDURE — 82947 ASSAY GLUCOSE BLOOD QUANT: CPT

## 2025-05-19 PROCEDURE — 93922 UPR/L XTREMITY ART 2 LEVELS: CPT

## 2025-05-19 PROCEDURE — 2500000004 HC RX 250 GENERAL PHARMACY W/ HCPCS (ALT 636 FOR OP/ED): Mod: JZ | Performed by: STUDENT IN AN ORGANIZED HEALTH CARE EDUCATION/TRAINING PROGRAM

## 2025-05-19 RX ADMIN — LIDOCAINE 4% 1 PATCH: 40 PATCH TOPICAL at 10:52

## 2025-05-19 RX ADMIN — TETRAHYDROZOLINE HCL 1 DROP: 0.05 SOLUTION/ DROPS OPHTHALMIC at 15:41

## 2025-05-19 RX ADMIN — ATORVASTATIN CALCIUM 80 MG: 80 TABLET, FILM COATED ORAL at 10:52

## 2025-05-19 RX ADMIN — ASPIRIN 81 MG CHEWABLE TABLET 81 MG: 81 TABLET CHEWABLE at 10:52

## 2025-05-19 RX ADMIN — ACETAMINOPHEN 650 MG: 325 TABLET ORAL at 20:34

## 2025-05-19 RX ADMIN — APIXABAN 2.5 MG: 2.5 TABLET, FILM COATED ORAL at 00:02

## 2025-05-19 RX ADMIN — METOPROLOL TARTRATE 25 MG: 25 TABLET, FILM COATED ORAL at 10:52

## 2025-05-19 RX ADMIN — INSULIN GLARGINE 10 UNITS: 100 INJECTION, SOLUTION SUBCUTANEOUS at 20:30

## 2025-05-19 RX ADMIN — ACETAMINOPHEN 650 MG: 325 TABLET ORAL at 15:43

## 2025-05-19 RX ADMIN — TETRAHYDROZOLINE HCL 1 DROP: 0.05 SOLUTION/ DROPS OPHTHALMIC at 18:29

## 2025-05-19 RX ADMIN — ACETAMINOPHEN 650 MG: 325 TABLET ORAL at 10:52

## 2025-05-19 RX ADMIN — APIXABAN 2.5 MG: 2.5 TABLET, FILM COATED ORAL at 23:30

## 2025-05-19 RX ADMIN — APIXABAN 2.5 MG: 2.5 TABLET, FILM COATED ORAL at 10:52

## 2025-05-19 RX ADMIN — METOPROLOL TARTRATE 25 MG: 25 TABLET, FILM COATED ORAL at 20:34

## 2025-05-19 RX ADMIN — TETRAHYDROZOLINE HCL 1 DROP: 0.05 SOLUTION/ DROPS OPHTHALMIC at 00:02

## 2025-05-19 RX ADMIN — TETRAHYDROZOLINE HCL 1 DROP: 0.05 SOLUTION/ DROPS OPHTHALMIC at 06:29

## 2025-05-19 RX ADMIN — TETRAHYDROZOLINE HCL 1 DROP: 0.05 SOLUTION/ DROPS OPHTHALMIC at 20:36

## 2025-05-19 RX ADMIN — ISOSORBIDE MONONITRATE 30 MG: 30 TABLET, EXTENDED RELEASE ORAL at 10:52

## 2025-05-19 RX ADMIN — HYDRALAZINE HYDROCHLORIDE 5 MG: 20 INJECTION INTRAMUSCULAR; INTRAVENOUS at 06:28

## 2025-05-19 ASSESSMENT — PAIN SCALES - GENERAL: PAINLEVEL_OUTOF10: 0 - NO PAIN

## 2025-05-19 ASSESSMENT — PAIN - FUNCTIONAL ASSESSMENT: PAIN_FUNCTIONAL_ASSESSMENT: 0-10

## 2025-05-19 NOTE — PROGRESS NOTES
NEPHROLOGY PROGRESS NOTE    REASON FOR CONSULT: LAURA on CKD stage III    SUBJECTIVE:  Patient is sitting up in chair.  He has noticed improvement in his leg swelling.  He has been trying to keep his leg elevated.  He has no trouble breathing.  He is wondering when he can go home.      OBJECTIVE:    Visit Vitals  /79   Pulse 85   Temp 36.1 °C (97 °F)   Resp 16          Intake/Output Summary (Last 24 hours) at 5/19/2025 1011  Last data filed at 5/19/2025 0500  Gross per 24 hour   Intake 894.23 ml   Output 2060 ml   Net -1165.77 ml        General: Awake and Alert, obese, in no distress, Cooperative  HEENT: Oral mucosa moist, EOMI  NECK: Supple  CHEST: No crackles, no wheeze, no tachypnea  CVS: S1,S2 heard, no rubs, irregular  ABD: Soft, Non Tender, BS present  EXT: Chronic nonpitting edema    MEDICATION:    Scheduled medications  Scheduled Medications[1]  Continuous medications  Continuous Medications[2]  PRN medications  PRN Medications[3]     RESULTS:    Lab Results   Component Value Date    WBC 6.5 05/19/2025    HGB 12.5 (L) 05/19/2025    HCT 38.9 (L) 05/19/2025    MCV 91 05/19/2025     05/19/2025        Lab Results   Component Value Date    CREATININE 1.83 (H) 05/19/2025    BUN 24 (H) 05/19/2025     05/19/2025    K 3.6 05/19/2025     05/19/2025    CO2 23 05/19/2025        Radiology Imaging reviewed      ASSESSMENT/PLAN:     1.  LAURA: Patient's initial creatinine was 2.43.  Creatinine is trending down today at 1.83.  He was over diuresed.  His leg swelling is improved with the improvement in his urine output after withholding his diuretics.  He does have heart failure with EF of 45%.  Currently continue to hold the diuretics and could resume torsemide 20 mg daily and then increase or decrease the dose based on his weight and fluid gain.    2.  CKD stage III: Baseline creatinine between 1.5-1.8.  Likely in the setting of chronic diuretic treatment.    3.  Peripheral fluid overload: Currently  volume status has improved and he has some wrinkling of the skin of the lower extremity with nonpitting edema.  Has EF of 45%.  Holding diuretics.    4.  Hematuria: Patient with prostate cancer with chronic indwelling Deal catheter for urinary retention.  Hemoglobin at 12.5.        Thank You very much for allowing me to participate in the care of this Patient    This document was created using dragon dictation and may contain unintended error    Herberth Villafuerte MD   05/19/25              [1] acetaminophen, 650 mg, oral, q6h during day  apixaban, 2.5 mg, oral, q12h  aspirin, 81 mg, oral, Daily  atorvastatin, 80 mg, oral, Daily  insulin glargine, 10 Units, subcutaneous, Nightly  insulin lispro, 0-5 Units, subcutaneous, TID AC  isosorbide mononitrate ER, 30 mg, oral, Daily  lidocaine, 1 patch, transdermal, Daily  metoprolol tartrate, 25 mg, oral, BID  perflutren lipid microspheres, 0.5-10 mL of dilution, intravenous, Once in imaging  tetrahydrozoline, 1 drop, Both Eyes, 4x daily  [2]    [3] PRN medications: dextrose, dextrose, glucagon, glucagon, hydrALAZINE, morphine, oxyCODONE-acetaminophen

## 2025-05-19 NOTE — PROGRESS NOTES
Dennis Galo is a 85 y.o. male on day 4 of admission presenting with Localized swelling, mass and lump, lower limb, bilateral.    Plan: continues treatment for fall at home with pain medication, will need follow up once therapy evaluations are updated regarding DC planning- Home with wife & [facility] vs SNF  Disposition: TBD  Barrier: therapy updates  ADOD:  2-3 days    0825AM   Called and left VM for patient's wife regarding DC planning. Awaiting return call.   0833am   Spoke with patient over the phone regarding DC planning. Discussed home health care options. Patient/family provided disclosure statement and agency listing. [facility] selected. Informed MD/NP and home care liaison. Final home care orders need to be sent upon DC from hospital. Patient currently refusing SNF placement, states he's up in the chair right now for breakfast and he wants to return home with his wife. His brother Kenny will be picking him up from the hospital when he is ready for DC.  Kenny (brother) [phone]         05/19/25 0820   Discharge Planning   Living Arrangements Spouse/significant other   Support Systems Spouse/significant other;Family members   Home or Post Acute Services In home services   Type of Home Care Services Home nursing visits;Home health aide;Home PT;Home OT   Expected Discharge Disposition Home H  (RE [facility])   Patient Choice   Patient / Family choosing to utilize agency / facility established prior to hospitalization Yes   Intensity of Service   Intensity of Service >30 min       Adelina Yeboah RN

## 2025-05-19 NOTE — CARE PLAN
The patient's goals for the shift include      The clinical goals for the shift include Maintain safety and comfort    Problem: Skin  Goal: Participates in plan/prevention/treatment measures  5/19/2025 0536 by Leana Edouard RN  Outcome: Progressing  Flowsheets (Taken 5/19/2025 0536)

## 2025-05-19 NOTE — PROGRESS NOTES
Dennis Galo is a 85 y.o. male on day 4 of admission presenting with Localized swelling, mass and lump, lower limb, bilateral.      Subjective   Pt seen and examined. He was sitting in the chair comfortably. Denies any new concerns or complaints. He did state that he will go home on discharge, not to SNF.        Objective     Last Recorded Vitals  /79   Pulse 85   Temp 36.1 °C (97 °F)   Resp 16   Wt 104 kg (230 lb)   SpO2 94%   Intake/Output last 3 Shifts:    Intake/Output Summary (Last 24 hours) at 5/19/2025 1100  Last data filed at 5/19/2025 0500  Gross per 24 hour   Intake 894.23 ml   Output 1500 ml   Net -605.77 ml       Admission Weight  Weight: 104 kg (230 lb) (05/15/25 0759)    Daily Weight  05/15/25 : 104 kg (230 lb)      Physical Exam  GEN: Awake and alert. Not in acute distress.   HEENT: Normocephalic and atraumatic.  Mucous membranes moist.  Clear sclera, PERRL, EOMI.  CARDIO: Irregularly irregular  RESP: Clear to auscultation b/l. No wheezes, rales or rhonchi. Good respiratory effort.   ABD: +BS x4, soft, non-tender. Not distended. No rebound or guarding.  SP catheter in place with leg bag, no hematuria  MSK: 2+ bilateral pitting edema, LLE with fluid filled bulla,  Right hip pain  NEURO: A&O X 3. CN II-XII are grossly intact. No focal deficits.   SKIN: Warm and dry, no lesions, no rashes.  PSYCH: Appropriate mood and affect, no hallucinations.  Relevant Results  Results for orders placed or performed during the hospital encounter of 05/15/25 (from the past 24 hours)   POCT GLUCOSE   Result Value Ref Range    POCT Glucose 109 (H) 74 - 99 mg/dL   POCT GLUCOSE   Result Value Ref Range    POCT Glucose 93 74 - 99 mg/dL   POCT GLUCOSE   Result Value Ref Range    POCT Glucose 136 (H) 74 - 99 mg/dL   Renal Function Panel   Result Value Ref Range    Glucose 84 74 - 99 mg/dL    Sodium 140 136 - 145 mmol/L    Potassium 3.6 3.5 - 5.3 mmol/L    Chloride 106 98 - 107 mmol/L    Bicarbonate 23 21 - 32  mmol/L    Anion Gap 15 10 - 20 mmol/L    Urea Nitrogen 24 (H) 6 - 23 mg/dL    Creatinine 1.83 (H) 0.50 - 1.30 mg/dL    eGFR 36 (L) >60 mL/min/1.73m*2    Calcium 8.3 (L) 8.6 - 10.3 mg/dL    Phosphorus 3.0 2.5 - 4.9 mg/dL    Albumin 3.2 (L) 3.4 - 5.0 g/dL   CBC   Result Value Ref Range    WBC 6.5 4.4 - 11.3 x10*3/uL    nRBC 0.0 0.0 - 0.0 /100 WBCs    RBC 4.29 (L) 4.50 - 5.90 x10*6/uL    Hemoglobin 12.5 (L) 13.5 - 17.5 g/dL    Hematocrit 38.9 (L) 41.0 - 52.0 %    MCV 91 80 - 100 fL    MCH 29.1 26.0 - 34.0 pg    MCHC 32.1 32.0 - 36.0 g/dL    RDW 13.5 11.5 - 14.5 %    Platelets 240 150 - 450 x10*3/uL   POCT GLUCOSE   Result Value Ref Range    POCT Glucose 82 74 - 99 mg/dL   SST TOP   Result Value Ref Range    Extra Tube 293      *Note: Due to a large number of results and/or encounters for the requested time period, some results have not been displayed. A complete set of results can be found in Results Review.        Transthoracic Echo Complete   Final Result      CT hip right wo IV contrast   Final Result   Sclerosis most likely due to blastic metastasis of right ischium,   pubic bones and ileum as described. Additional findings as above.        Signed by: Romario Nguyen 5/16/2025 9:03 AM   Dictation workstation:   [id number]      Vascular US lower extremity venous duplex bilateral   Final Result      XR hip right with pelvis when performed 2 or 3 views   Final Result   1.  No evidence for acute fracture or dislocation.   2.  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  PT and OT evaluation, [facility] 16/17.  CT right hip reviewed, no acute fractures but there are blastic lesions in right ischium, pubic bones and ileum.  Will recommend referral to oncology at discharge.  Treating LAURA on CKD 3.  Creatinine 2.4 at admission, baseline is 1.6.  Holding Torsemide for now, monitor chemistry panel and urine output.   Continue to hold oral diuretics for now. Will await nephrology recs about when to resume diuretics. Anticipate could resume diuretics in 24-48 hrs if kidney functions continue to improve  New AF, currently rate controlled.  On heparin infusion and monitoring for hematuria.  I discussed with patient about stroke risks a/w A-fib.  He states he does not fall much, this was the first time falling.  Being on longterm AC increases chances of bleeding, with his history of hematuria I discussed with him this will need to be closely monitored.  He is in agreement to take a AC for now but states he may change his mind later.  Will transition off heparin infusion, start adjusted dosing Eliquis 2.5mg BID.  Cardiology consulted.  Rate controlled on Lopressor.  Podiatry consulted for lower extremity wound care.  Up in chair with meals, ambulate hallway with assistance.  Younger brother Ray [phone].        Maddy Galindo MD           [1] acetaminophen, 650 mg, oral, q6h during day  apixaban, 2.5 mg, oral, q12h  aspirin, 81 mg, oral, Daily  atorvastatin, 80 mg, oral, Daily  insulin glargine, 10 Units, subcutaneous, Nightly  insulin lispro, 0-5 Units, subcutaneous, TID AC  isosorbide mononitrate ER, 30 mg, oral, Daily  lidocaine, 1 patch, transdermal, Daily  metoprolol tartrate, 25 mg, oral, BID  perflutren lipid microspheres, 0.5-10 mL of dilution, intravenous, Once in imaging  tetrahydrozoline, 1 drop, Both Eyes, 4x daily    [2]    [3] PRN medications: dextrose, dextrose,  glucagon, glucagon, hydrALAZINE, morphine, oxyCODONE-acetaminophen

## 2025-05-19 NOTE — CARE PLAN
The patient's goals for the shift include      The clinical goals for the shift include Maintain safety and comfort    Problem: Safety - Adult  Goal: Free from fall injury  5/19/2025 0530 by Leana Edouard RN  Outcome: Progressing    Problem: Pain - Adult  Goal: Verbalizes/displays adequate comfort level or baseline comfort level  5/19/2025 0530 by Leana Edouard, RN  Outcome: Progressing

## 2025-05-19 NOTE — PROGRESS NOTES
PODIATRY SERVICE CONSULT PROGRESS NOTE    SERVICE DATE: 5/19/2025   SERVICE TIME:  17:01    Subjective   INTERVAL HPI:   Patient was seen at bedside.   Pain well controlled. Edema has improved with compression.  Patient denies any constitutional symptoms.   No other pedal complaints.     Medications:  Scheduled Meds: Scheduled Medications[1]  Continuous Infusions: Continuous Medications[2]  PRN Meds: PRN Medications[3]         Objective   PHYSICAL EXAM:  Physical Exam Performed:  Vitals:    05/19/25 1632   BP: 166/85   Pulse: 77   Resp:    Temp: 36.4 °C (97.5 °F)   SpO2: 93%     Body mass index is 33 kg/m².    Patient is AOx3 and in no acute distress. Patient is alert and cooperative. Sitting comfortably in bed with dressings clean, dry and intact. Unaccompanied.     Vascular: Non-palpable DP/PT pulses B/L. Moderate 1+ pitting edema noted B/L, improved since yesterday. Perimalleolar varicosities appreciable. Hair growth absent B/L. CFT<5 to B/L hallux. Temperature is warm to warm from tibial tuberosity to distal digits B/L.     Musculoskeletal: No tenderness to palpation B/L. Gross active and passive ROM intact to age and activity level. Moves all extremities spontaneously.     Neurological: Intact light touch sensation B/L. Pain stimuli diminished B/L.     Dermatologic: Left pretibial bulla as noted below. Small dry scab to right pretibial region. Nails 1-5 are intact B/L. Skin appears diffusely xerotic with some erythema consistent with venous stasis dermatitis B/L. Web spaces 1-4 B/L are clean, dry and intact.     Left pretibial bulla, right pretibial scab  Images:    Measurements: 7.0 x 4.5 cm with epithelial roof grossly intact, no longer fluid-filled  No drainage  No ashley-wound maceration.   No ashley-wound erythema.   No evidence of ascending cellulitis or lymphangitis.  No palpable fluctuance.  No malodor. No increased warmth. No purulence. No necrosis. No generalized weeping.  No probe to bone or deep  structures within the wound base.     Cultures:  No results found for the last 90 days.      LABS:     Lab Results   Component Value Date    HGBA1C 9.2 (H) 10/25/2024        Results from last 7 days   Lab Units 05/19/25  0646   WBC AUTO x10*3/uL 6.5   RBC AUTO x10*6/uL 4.29*   HEMOGLOBIN g/dL 12.5*   HEMATOCRIT % 38.9*     Results from last 7 days   Lab Units 05/19/25  0646 05/18/25  0531 05/17/25  0712 05/16/25  0609 05/15/25  0821   SODIUM mmol/L 140   < > 141   < > 141   POTASSIUM mmol/L 3.6   < > 3.5   < > 3.6   CHLORIDE mmol/L 106   < > 104   < > 103   CO2 mmol/L 23   < > 27   < > 28   BUN mg/dL 24*   < > 24*   < > 28*   CREATININE mg/dL 1.83*   < > 1.95*   < > 2.43*   CALCIUM mg/dL 8.3*   < > 8.3*   < > 8.6   PHOSPHORUS mg/dL 3.0   < > 2.6  --   --    MAGNESIUM mg/dL  --   --  1.81  --  1.77   BILIRUBIN TOTAL mg/dL  --   --   --   --  1.1   ALT U/L  --   --   --   --  8*   AST U/L  --   --   --   --  13    < > = values in this interval not displayed.                Assessment/Plan   ASSESSMENT & PLAN:    # Chronic venous insufficiency  # Lower extremity edema  # Bulla left lower extremity  # Type 2 DM    - Patient was seen and evaluated; all findings were discussed and all questions were answered to patient's satisfaction.  - Charts, labs, vitals and imaging all reviewed.   - Labs: WBC 6.5, HGB 12.5, SCr 1.83  - PVRs: (5/19/25) multiphasic waveforms throughout   RIGHT Lower PVR                Pressures Ratios  Right Posterior Tibial (Ankle) 146 mmHg  0.75  Right Dorsalis Pedis (Ankle)   159 mmHg  0.82  Right Digit (Great Toe)        97 mmHg   0.50  LEFT Lower PVR                Pressures Ratios  Left Posterior Tibial (Ankle) 201 mmHg  1.03  Left Dorsalis Pedis (Ankle)   186 mmHg  0.95  Left Digit (Great Toe)        148 mmHg  0.76    Plan:  - Evaluated bedside on nursing floor. Dressing changed as noted below.   - No plan for podiatric surgical intervention during this hospitalization.  - Dressings: Cleanse  both legs with vashe and pat dry. Dress left leg with Adaptic, silver alginate, ABD pad, kerlix and ace wrap. Right leg just ace wrap mild compression. To be changed daily.   - Nursing staff is able to change dressing as ordered. Thank you.  - Pain regimen per primary  - Bowel regimen per primary  - Podiatry will continue to follow peripherally while in house. Thank you for the consult.  - Discussed with attending Dr. Mcleod.    DVT ppx: subcutaneous heparin, per primary  Weightbearing: WBAT BLE  Discharge: Pt to follow up 1 week after discharge with Dr. Mcleod or Dr. Ledesma    This patient will be followed by the Podiatry service. Please 2CRisk Chat the corresponding residents below with questions or concerns.     1st call:   Leland Levine DPM PGY-1  Podiatric Medicine and Surgery   Epic Secure Chat Preferred    2nd call:   Eddie Altamirano DPM PGY-3  Podiatric Medicine and Surgery   Epic Secure Chat Preferred        SIGNATURE: Eddie Altamirano DPM PATIENT NAME: Dennis Galo   DATE: May 19, 2025 MRN: [id number]   TIME: 6:15 PM CONTACT: Epic Chat              [1] acetaminophen, 650 mg, oral, q6h during day  apixaban, 2.5 mg, oral, q12h  aspirin, 81 mg, oral, Daily  atorvastatin, 80 mg, oral, Daily  insulin glargine, 10 Units, subcutaneous, Nightly  insulin lispro, 0-5 Units, subcutaneous, TID AC  isosorbide mononitrate ER, 30 mg, oral, Daily  lidocaine, 1 patch, transdermal, Daily  metoprolol tartrate, 25 mg, oral, BID  perflutren lipid microspheres, 0.5-10 mL of dilution, intravenous, Once in imaging  tetrahydrozoline, 1 drop, Both Eyes, 4x daily     [2]    [3] PRN medications: dextrose, dextrose, glucagon, glucagon, hydrALAZINE, morphine, oxyCODONE-acetaminophen

## 2025-05-19 NOTE — DISCHARGE INSTR - OTHER ORDERS
WOUND CARE:  Bilateral Lower Extremities:  Cleanse both legs with vashe and pat dry. Dress left leg with Adaptic, silver alginate, ABD pad, kerlix and ace wrap. Right leg just ace wrap mild compression. To be changed daily.

## 2025-05-20 ENCOUNTER — DOCUMENTATION (OUTPATIENT)
Dept: HOME HEALTH SERVICES | Facility: HOME HEALTH | Age: 85
End: 2025-05-20
Payer: MEDICARE

## 2025-05-20 ENCOUNTER — HOME CARE VISIT (OUTPATIENT)
Dept: HOME HEALTH SERVICES | Facility: HOME HEALTH | Age: 85
End: 2025-05-20
Payer: MEDICARE

## 2025-05-20 VITALS
BODY MASS INDEX: 32.93 KG/M2 | DIASTOLIC BLOOD PRESSURE: 91 MMHG | HEART RATE: 81 BPM | WEIGHT: 230 LBS | HEIGHT: 70 IN | OXYGEN SATURATION: 92 % | SYSTOLIC BLOOD PRESSURE: 168 MMHG | TEMPERATURE: 98.1 F | RESPIRATION RATE: 20 BRPM

## 2025-05-20 LAB
ALBUMIN SERPL BCP-MCNC: 3.2 G/DL (ref 3.4–5)
ANION GAP SERPL CALC-SCNC: 10 MMOL/L (ref 10–20)
ATRIAL RATE: 0 BPM
BUN SERPL-MCNC: 24 MG/DL (ref 6–23)
CALCIUM SERPL-MCNC: 8.5 MG/DL (ref 8.6–10.3)
CHLORIDE SERPL-SCNC: 105 MMOL/L (ref 98–107)
CO2 SERPL-SCNC: 29 MMOL/L (ref 21–32)
CREAT SERPL-MCNC: 1.94 MG/DL (ref 0.5–1.3)
EGFRCR SERPLBLD CKD-EPI 2021: 34 ML/MIN/1.73M*2
ERYTHROCYTE [DISTWIDTH] IN BLOOD BY AUTOMATED COUNT: 13.5 % (ref 11.5–14.5)
GLUCOSE BLD MANUAL STRIP-MCNC: 132 MG/DL (ref 74–99)
GLUCOSE BLD MANUAL STRIP-MCNC: 75 MG/DL (ref 74–99)
GLUCOSE SERPL-MCNC: 68 MG/DL (ref 74–99)
HCT VFR BLD AUTO: 40.2 % (ref 41–52)
HGB BLD-MCNC: 12.6 G/DL (ref 13.5–17.5)
MCH RBC QN AUTO: 29 PG (ref 26–34)
MCHC RBC AUTO-ENTMCNC: 31.3 G/DL (ref 32–36)
MCV RBC AUTO: 92 FL (ref 80–100)
NRBC BLD-RTO: 0 /100 WBCS (ref 0–0)
PHOSPHATE SERPL-MCNC: 3.1 MG/DL (ref 2.5–4.9)
PLATELET # BLD AUTO: 261 X10*3/UL (ref 150–450)
POTASSIUM SERPL-SCNC: 3.9 MMOL/L (ref 3.5–5.3)
PR INTERVAL: 210 MS
Q ONSET: 253 MS
QRS COUNT: 14 BEATS
QRS DURATION: 108 MS
QT INTERVAL: 402 MS
QTC CALCULATION(BAZETT): 467 MS
QTC FREDERICIA: 444 MS
R AXIS: -88 DEGREES
RBC # BLD AUTO: 4.35 X10*6/UL (ref 4.5–5.9)
SODIUM SERPL-SCNC: 140 MMOL/L (ref 136–145)
T AXIS: 60 DEGREES
T OFFSET: 454 MS
VENTRICULAR RATE: 81 BPM
WBC # BLD AUTO: 6.8 X10*3/UL (ref 4.4–11.3)

## 2025-05-20 PROCEDURE — 2500000001 HC RX 250 WO HCPCS SELF ADMINISTERED DRUGS (ALT 637 FOR MEDICARE OP): Performed by: INTERNAL MEDICINE

## 2025-05-20 PROCEDURE — 82947 ASSAY GLUCOSE BLOOD QUANT: CPT

## 2025-05-20 PROCEDURE — 2500000001 HC RX 250 WO HCPCS SELF ADMINISTERED DRUGS (ALT 637 FOR MEDICARE OP): Performed by: STUDENT IN AN ORGANIZED HEALTH CARE EDUCATION/TRAINING PROGRAM

## 2025-05-20 PROCEDURE — 2500000005 HC RX 250 GENERAL PHARMACY W/O HCPCS: Performed by: STUDENT IN AN ORGANIZED HEALTH CARE EDUCATION/TRAINING PROGRAM

## 2025-05-20 PROCEDURE — 85027 COMPLETE CBC AUTOMATED: CPT | Performed by: INTERNAL MEDICINE

## 2025-05-20 PROCEDURE — 80069 RENAL FUNCTION PANEL: CPT | Performed by: INTERNAL MEDICINE

## 2025-05-20 PROCEDURE — 36415 COLL VENOUS BLD VENIPUNCTURE: CPT | Performed by: INTERNAL MEDICINE

## 2025-05-20 RX ORDER — AMLODIPINE BESYLATE 5 MG/1
5 TABLET ORAL DAILY
Status: DISCONTINUED | OUTPATIENT
Start: 2025-05-20 | End: 2025-05-20 | Stop reason: HOSPADM

## 2025-05-20 RX ORDER — TORSEMIDE 20 MG/1
20 TABLET ORAL DAILY
Start: 2025-05-20

## 2025-05-20 RX ORDER — ACETAMINOPHEN 325 MG/1
650 TABLET ORAL EVERY 6 HOURS PRN
Qty: 90 TABLET | Refills: 0 | Status: SHIPPED | OUTPATIENT
Start: 2025-05-20

## 2025-05-20 RX ORDER — LIDOCAINE 560 MG/1
1 PATCH PERCUTANEOUS; TOPICAL; TRANSDERMAL DAILY
Qty: 7 PATCH | Refills: 0 | Status: SHIPPED | OUTPATIENT
Start: 2025-05-21

## 2025-05-20 RX ORDER — AMLODIPINE BESYLATE 5 MG/1
5 TABLET ORAL DAILY
Qty: 30 TABLET | Refills: 1 | Status: SHIPPED | OUTPATIENT
Start: 2025-05-21 | End: 2025-07-20

## 2025-05-20 RX ORDER — OXYCODONE AND ACETAMINOPHEN 5; 325 MG/1; MG/1
1 TABLET ORAL EVERY 8 HOURS PRN
Qty: 10 TABLET | Refills: 0 | Status: SHIPPED | OUTPATIENT
Start: 2025-05-20

## 2025-05-20 RX ADMIN — LIDOCAINE 4% 1 PATCH: 40 PATCH TOPICAL at 08:37

## 2025-05-20 RX ADMIN — ATORVASTATIN CALCIUM 80 MG: 80 TABLET, FILM COATED ORAL at 08:37

## 2025-05-20 RX ADMIN — TETRAHYDROZOLINE HCL 1 DROP: 0.05 SOLUTION/ DROPS OPHTHALMIC at 06:22

## 2025-05-20 RX ADMIN — METOPROLOL TARTRATE 25 MG: 25 TABLET, FILM COATED ORAL at 08:37

## 2025-05-20 RX ADMIN — APIXABAN 2.5 MG: 2.5 TABLET, FILM COATED ORAL at 10:39

## 2025-05-20 RX ADMIN — ISOSORBIDE MONONITRATE 30 MG: 30 TABLET, EXTENDED RELEASE ORAL at 08:37

## 2025-05-20 RX ADMIN — ASPIRIN 81 MG CHEWABLE TABLET 81 MG: 81 TABLET CHEWABLE at 08:37

## 2025-05-20 RX ADMIN — AMLODIPINE BESYLATE 5 MG: 5 TABLET ORAL at 08:39

## 2025-05-20 RX ADMIN — ACETAMINOPHEN 650 MG: 325 TABLET ORAL at 08:37

## 2025-05-20 RX ADMIN — TETRAHYDROZOLINE HCL 1 DROP: 0.05 SOLUTION/ DROPS OPHTHALMIC at 12:40

## 2025-05-20 ASSESSMENT — PAIN - FUNCTIONAL ASSESSMENT: PAIN_FUNCTIONAL_ASSESSMENT: 0-10

## 2025-05-20 ASSESSMENT — PAIN SCALES - GENERAL: PAINLEVEL_OUTOF10: 0 - NO PAIN

## 2025-05-20 NOTE — PROGRESS NOTES
NEPHROLOGY PROGRESS NOTE    REASON FOR CONSULT: LAURA on CKD stage III    SUBJECTIVE:  Patient is resting comfortably.  His leg is Ace wrapped.  Leg swelling has improved.  He will be going home today.    OBJECTIVE:    Visit Vitals  BP (!) 190/103 (BP Location: Right arm, Patient Position: Lying)   Pulse 87   Temp 36.7 °C (98.1 °F) (Temporal)   Resp 20          Intake/Output Summary (Last 24 hours) at 5/20/2025 1109  Last data filed at 5/20/2025 0900  Gross per 24 hour   Intake 476 ml   Output 1425 ml   Net -949 ml        General: Awake and Alert, obese, in no distress, Cooperative  HEENT: Oral mucosa moist, EOMI  NECK: Supple  CHEST: No crackles, no wheeze, no tachypnea  CVS: S1,S2 heard, no rubs, irregular  ABD: Soft, Non Tender, BS present  EXT: Chronic nonpitting edema, Ace wrap noted to the left thigh    MEDICATION:    Scheduled medications  Scheduled Medications[1]  Continuous medications  Continuous Medications[2]  PRN medications  PRN Medications[3]     RESULTS:    Lab Results   Component Value Date    WBC 6.8 05/20/2025    HGB 12.6 (L) 05/20/2025    HCT 40.2 (L) 05/20/2025    MCV 92 05/20/2025     05/20/2025        Lab Results   Component Value Date    CREATININE 1.94 (H) 05/20/2025    BUN 24 (H) 05/20/2025     05/20/2025    K 3.9 05/20/2025     05/20/2025    CO2 29 05/20/2025        Radiology Imaging reviewed      ASSESSMENT/PLAN:     1.  LAURA: Patient's initial creatinine was 2.43.  Renal function is stable with creatinine at 1.94.  He was over diuresed.  His leg swelling is improved with the improvement in his urine output after withholding his diuretics.  He does have heart failure with EF of 45%.  Trend the renal panel while in the hospital.  Stable for discharge from nephrology standpoint.  Hold diuretics while in the hospital.  If he is discharged I did tell him to weigh himself daily and also to monitor his leg swelling and use the diuretic as needed    2.  CKD stage III: Baseline  creatinine between 1.5-2.  Likely in the setting of chronic diuretic treatment as well as heart failure with EF of 45%.    3.  Peripheral fluid overload: Currently volume status has improved and he has some wrinkling of the skin of the lower extremity with nonpitting edema.  Has EF of 45%.  Holding diuretics.    4.  Hematuria: Patient with prostate cancer with chronic indwelling Deal catheter for urinary retention.  Hemoglobin at 12.6.        Thank You very much for allowing me to participate in the care of this Patient    This document was created using dragon dictation and may contain unintended error    Herberth Villafuerte MD   05/20/25                [1] acetaminophen, 650 mg, oral, q6h during day  amLODIPine, 5 mg, oral, Daily  apixaban, 2.5 mg, oral, q12h  aspirin, 81 mg, oral, Daily  atorvastatin, 80 mg, oral, Daily  insulin glargine, 10 Units, subcutaneous, Nightly  insulin lispro, 0-5 Units, subcutaneous, TID AC  isosorbide mononitrate ER, 30 mg, oral, Daily  lidocaine, 1 patch, transdermal, Daily  metoprolol tartrate, 25 mg, oral, BID  perflutren lipid microspheres, 0.5-10 mL of dilution, intravenous, Once in imaging  tetrahydrozoline, 1 drop, Both Eyes, 4x daily     [2]    [3] PRN medications: dextrose, dextrose, glucagon, glucagon, hydrALAZINE, morphine, oxyCODONE-acetaminophen

## 2025-05-20 NOTE — DISCHARGE SUMMARY
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   amLODIPine 5 mg tablet; Commonly known as: Norvasc; Take 1 tablet (5 mg)   by mouth once daily.; Start taking on: May 21, 2025   apixaban 2.5 mg tablet; Commonly known as: Eliquis; Take 1 tablet (2.5   mg) by mouth every 12 hours.   lidocaine 4 % patch; Place 1 patch over 12 hours on the skin once daily.   Remove & discard patch within 12 hours or as directed by MD.; Start taking   on: May 21, 2025   oxyCODONE-acetaminophen 5-325 mg tablet; Commonly known as: Percocet;   Take 1 tablet by mouth every 8 hours if needed for moderate pain (4 - 6)   or severe pain (7 - 10).     CHANGE how you take these medications     atorvastatin 80 mg tablet; Commonly known as: Lipitor; Take 1 tablet (80   mg) by mouth once daily.; What changed: when to take this   torsemide 20 mg tablet; Commonly known as: Demadex; Take 1 tablet (20   mg) by mouth once daily.; What changed: how much to take   Trulicity 3 mg/0.5 mL injection; Generic drug: dulaglutide; INJECT 3 MG   SUBCUTANEOUSLY WEEKLY; What changed: additional instructions     CONTINUE taking these medications     ammonium lactate 12 % cream; Commonly known as: Amlactin   aspirin 81 mg chewable tablet   Blood glucose monitoring meter; Commonly known as: OneTouch Ultra2   Meter; As directed   isosorbide mononitrate ER 30 mg 24 hr tablet; Commonly known as: Imdur;   Take 1 tablet (30 mg) by mouth once daily.   lancets misc; Commonly known as: OneTouch UltraSoft Lancets; Use once   daily   Lantus Solostar U-100 Insulin 100 unit/mL (3  "mL) pen; Generic drug:   insulin glargine; INJECT 24 UNITS UNDER THE SKIN ONCE DAILY IN THE   MORNING.   metoprolol tartrate 25 mg tablet; Commonly known as: Lopressor; TAKE 1   TABLET BY MOUTH TWICE A DAY   nystatin 100,000 unit/gram powder; Commonly known as: Mycostatin; Apply   1 Application topically 2 times a day.   OneTouch Ultra Test; Generic drug: blood sugar diagnostic; USE ONCE   DAILY AS DIRECTED   pen needle, diabetic 32 gauge x 5/32\" needle; Commonly known as: BD Arelis   2nd Gen Pen Needle; USE ONCE DAILY     STOP taking these medications     gabapentin 100 mg capsule; Commonly known as: Neurontin   LEUPROLIDE (BULK) MISC   SITagliptin phosphate 100 mg tablet; Commonly known as: Januvia       Test Results Pending At Discharge  Pending Labs       No current pending labs.            Hospital Course   85 year old male PMHx CHF, CKD 3, diabetes mellitus, h/o prostate cancer, neurogenic bladder s/p SP catheter presented to [facility] with complaints of right hip and right leg pain,.  He fell when his legs gave out when he was trying to stand 5 days ago and has been having pain since.  CT findings showed blastic lesions in right ischium, pubic bones and ileum concerning for prostate cancer metastasis.  Results discussed with patient and he is recommended follow up with oncology outpatient.  He was also found to have LAURA on CKD, creatinine 2.4, baseline is 1.6.  Treated with IVFs and creatinine did downtrend.  His Torsemide was held.  Patient also found to have new A-fib.  This was rate controlled with Lopressor 25mg BID.  Cardiology consulted, we did start Eliquis 2.5mg BID for stroke prophylaxis.  Podiatry consulted for LLE wound care of fluid filled bulla.  His right hip and right leg pain did improve.  PT and OT evaluated patient, he is agreeable to PT and OT at discharge.  Follow up with oncology, cardiology outpatient.    Pertinent Physical Exam At Time of Discharge  Physical Exam  GEN:  awake, alert, not in " acute distress  HEENT:  normocephalic, atraumatic, PERRL, EOM intact, nares patent  Cardio:  Irregularly irregular  Resp:  CTAB, no wheezing  Abd:  +BS, soft, nontender  :  SP catheter in place  Neuro:  A&Ox3, CN2-12 grossly intact, no focal deficits  Msk: Bilateral LE edema  Skin:  warm, dry, intact  Psych:  appropriate in mood and behavior       Outpatient Follow-Up  Future Appointments   Date Time Provider Department Center   5/22/2025  1:00 PM Porsha Montoya LPN [facility]   5/31/2025 12:00 PM Porsha Montoya LPN [facility]   6/6/2025  8:45 AM Triston Samson MD [id number] [place]   6/10/2025 To Be Determined Partha Luu RN [facility]         Rafael Balbuena,

## 2025-05-20 NOTE — HH CARE COORDINATION
Home Care received a Referral to Resume Care for Nursing, Physical Therapy, and Occupational Therapy. We have processed the referral for a Resumption of Care on 05-21-25, 24-48 HOURS.     If you have any questions or concerns, please feel free to contact us at [phone]. Follow the prompts, enter your five digit zip code, and you will be directed to your care team on WE[facility] 3.

## 2025-05-21 ENCOUNTER — PATIENT OUTREACH (OUTPATIENT)
Dept: PRIMARY CARE | Facility: CLINIC | Age: 85
End: 2025-05-21
Payer: MEDICARE

## 2025-05-21 NOTE — PROGRESS NOTES
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 Left voicemail with contact information for patient to call back with any non-emergent questions or concerns.    Robin Alba LPN

## 2025-05-22 ENCOUNTER — HOME CARE VISIT (OUTPATIENT)
Dept: HOME HEALTH SERVICES | Facility: HOME HEALTH | Age: 85
End: 2025-05-22
Payer: MEDICARE

## 2025-05-22 VITALS
SYSTOLIC BLOOD PRESSURE: 140 MMHG | TEMPERATURE: 98.7 F | RESPIRATION RATE: 18 BRPM | OXYGEN SATURATION: 97 % | HEART RATE: 77 BPM | DIASTOLIC BLOOD PRESSURE: 60 MMHG

## 2025-05-22 PROCEDURE — G0151 HHCP-SERV OF PT,EA 15 MIN: HCPCS

## 2025-05-22 PROCEDURE — G0152 HHCP-SERV OF OT,EA 15 MIN: HCPCS

## 2025-05-22 PROCEDURE — G0299 HHS/HOSPICE OF RN EA 15 MIN: HCPCS

## 2025-05-22 SDOH — ECONOMIC STABILITY: HOUSING INSECURITY
HOME SAFETY: PATIENT DOESN'T HAVE SMOKE DETECTORS INSTALLED, RECOMMENDED TO CALL N. ROYALTON FD. PATIENT DECLINED, SAYING "I'M NOT WORRIED ABOUT IT."

## 2025-05-22 ASSESSMENT — ENCOUNTER SYMPTOMS
OCCASIONAL FEELINGS OF UNSTEADINESS: 0
LOWER EXTREMITY EDEMA: 1
APPETITE LEVEL: FAIR
HIGHEST PAIN SEVERITY IN PAST 24 HOURS: 2/10
PAIN: 1
PERSON REPORTING PAIN: PATIENT
HIGHEST PAIN SEVERITY IN PAST 24 HOURS: 4/10
MUSCLE WEAKNESS: 1
PERSON REPORTING PAIN: PATIENT
LOWEST PAIN SEVERITY IN PAST 24 HOURS: 0/10
PAIN SEVERITY GOAL: 0/10
PAIN LOCATION: RIGHT HIP
LOWEST PAIN SEVERITY IN PAST 24 HOURS: 0/10
LOWEST PAIN SEVERITY IN PAST 24 HOURS: 2/10
PAIN LOCATION - PAIN SEVERITY: 2/10
PAIN: 1
HIGHEST PAIN SEVERITY IN PAST 24 HOURS: 4/10

## 2025-05-22 ASSESSMENT — ACTIVITIES OF DAILY LIVING (ADL)
TOILETING: INDEPENDENT
CURRENT_FUNCTION: INDEPENDENT
GROOMING_CURRENT_FUNCTION: INDEPENDENT
PHYSICAL TRANSFERS ASSESSED: 1
PREPARING MEALS: NEEDS ASSISTANCE
ENTERING_EXITING_HOME: STAND BY ASSIST
FEEDING ASSESSED: 1
GROOMING ASSESSED: 1
LAUNDRY: DEPENDENT
LAUNDRY ASSESSED: 1
DRESSING_UB_CURRENT_FUNCTION: INDEPENDENT
OASIS_M1830: 03
BATHING_CURRENT_FUNCTION: INDEPENDENT
AMBULATION ASSISTANCE ON FLAT SURFACES: 1
TOILETING: 1
FEEDING: INDEPENDENT
BATHING ASSESSED: 1
DRESSING_LB_CURRENT_FUNCTION: MINIMUM ASSIST

## 2025-05-22 ASSESSMENT — PAIN SCALES - PAIN ASSESSMENT IN ADVANCED DEMENTIA (PAINAD): BREATHING: 0

## 2025-05-22 NOTE — HOME HEALTH
"Patient was recently hospitalized with a fall, states his right knee buckled and he went down. he couldn't get up and called EMS. He states his knee hasn't buckled since that incident.     Lives with spouse in a ranch home with 3 steps to enter the front door. There are 2 steps up to main level with bedroom and full bathrooms (doesn't get into the tub).     Medical history of HTN, DM, CHF, neurogenic bladder with suprapubic catheter, Afib, LAURA on CKD, prostate CA with mets to the bone.    Patient has a wheeled walker, cane.    Prior to hospitalization, was independent with ADLs. He only performed sponge bathing, didn't shower. He states he would need help with donning his shoes. He used a wheeled walker for ambulation, states he would sometimes use the cane but used the walker \"80 percent of the time.\" He was independent with light meal prep. Spouse performed laundry-he states he doesn't go down to the basement. He was independent with light cleaning. He states he hasn't driven for a few months, states he will call someone to get a ride.     UE AROM and strength WNL. Patient in agreement with OT POC and no further OT visits indicated. OT DC at this date, goals met. SN and PT services are still active.    Medication Reconciliation:    Demonstrates Adherence: Yes  Issues/Concerns: No  Provider Notified of Issues/Concerns: N/A  Caregiver Notified of Issues/Concerns: N/A  Pill bottles visualized during treatment: No, Explain: LUIS ENRIQUE to be completed later today by nursing."

## 2025-05-22 NOTE — HOME HEALTH
patient seen for pt marcello today following hospitalization due to a fall in the home .  he states his r le gave out on him due to pain.  no fractures noted .  prior to this he walked with a ww, needed assist with putting shoes / socks on , indep with sponge bathing and does not get in and out of the shower.  patient states hes functioning at his baseline and does not need follow up visits.  hes also not intereste in any exs.

## 2025-05-28 DIAGNOSIS — I50.20 SYSTOLIC HEART FAILURE, UNSPECIFIED HF CHRONICITY: ICD-10-CM

## 2025-05-28 DIAGNOSIS — N18.32 TYPE 2 DIABETES MELLITUS WITH STAGE 3B CHRONIC KIDNEY DISEASE, WITH LONG-TERM CURRENT USE OF INSULIN (MULTI): Primary | ICD-10-CM

## 2025-05-28 DIAGNOSIS — E11.22 TYPE 2 DIABETES MELLITUS WITH STAGE 3B CHRONIC KIDNEY DISEASE, WITH LONG-TERM CURRENT USE OF INSULIN (MULTI): Primary | ICD-10-CM

## 2025-05-28 DIAGNOSIS — Z79.4 TYPE 2 DIABETES MELLITUS WITH STAGE 3B CHRONIC KIDNEY DISEASE, WITH LONG-TERM CURRENT USE OF INSULIN (MULTI): Primary | ICD-10-CM

## 2025-05-31 ENCOUNTER — HOME CARE VISIT (OUTPATIENT)
Dept: HOME HEALTH SERVICES | Facility: HOME HEALTH | Age: 85
End: 2025-05-31
Payer: MEDICARE

## 2025-05-31 VITALS
OXYGEN SATURATION: 96 % | SYSTOLIC BLOOD PRESSURE: 132 MMHG | TEMPERATURE: 98.2 F | RESPIRATION RATE: 18 BRPM | HEART RATE: 72 BPM | DIASTOLIC BLOOD PRESSURE: 68 MMHG

## 2025-05-31 PROCEDURE — G0300 HHS/HOSPICE OF LPN EA 15 MIN: HCPCS

## 2025-05-31 ASSESSMENT — ACTIVITIES OF DAILY LIVING (ADL): MONEY MANAGEMENT (EXPENSES/BILLS): INDEPENDENT

## 2025-05-31 ASSESSMENT — ENCOUNTER SYMPTOMS
SUBJECTIVE PAIN PROGRESSION: UNCHANGED
DENIES PAIN: 1
LOWER EXTREMITY EDEMA: 1
CHANGE IN APPETITE: UNCHANGED
FATIGUE: 1
DRY SKIN: 1
MUSCLE WEAKNESS: 1
PERSON REPORTING PAIN: PATIENT
LAST BOWEL MOVEMENT: 67356
LOWEST PAIN SEVERITY IN PAST 24 HOURS: 0/10
BOWEL PATTERN NORMAL: 1
APPETITE LEVEL: GOOD
HIGHEST PAIN SEVERITY IN PAST 24 HOURS: 2/10
FATIGUES EASILY: 1
PAIN SEVERITY GOAL: 0/10

## 2025-05-31 NOTE — HOME HEALTH
Patient seen today to monitor B/L LE edema and reinforce daily weighing. Patient was recently hospitalized due to fall, he denies any recent falls and states he is keeping the walker beside him when seated so he does not have to reach for it. +1 B/L LE edema observed as well as a blister to left shin. He seeing Dr. Ledesma to monitor blister. Patient states appetite is good and denies any issues moving bowels. Suprapubic catheter is observed to be intact and patent with blood- tinged urine flowing freely through tubing to leg bag.

## 2025-06-04 ENCOUNTER — PATIENT OUTREACH (OUTPATIENT)
Dept: PRIMARY CARE | Facility: CLINIC | Age: 85
End: 2025-06-04
Payer: MEDICARE

## 2025-06-04 NOTE — PROGRESS NOTES
Confirmation of at least 2 patient identifiers.    Completed telephonic follow-up with patient approximately 14 days post discharge, no PCP follow up.   Spoke to patient during outreach call.    Patient reports feeling: Improved    Patient has questions or concerns about medications: No    Have all prescribed medications been filled? Yes    Patient has necessary resources to manage their care? Yes    Patient has questions or concerns? No    Next care management follow-up approximately within one month.  Care  information provided to patient.    Robin Alba LPN

## 2025-06-05 ENCOUNTER — HOME CARE VISIT (OUTPATIENT)
Dept: HOME HEALTH SERVICES | Facility: HOME HEALTH | Age: 85
End: 2025-06-05
Payer: MEDICARE

## 2025-06-05 VITALS
TEMPERATURE: 98.6 F | HEART RATE: 88 BPM | SYSTOLIC BLOOD PRESSURE: 138 MMHG | OXYGEN SATURATION: 96 % | DIASTOLIC BLOOD PRESSURE: 76 MMHG | RESPIRATION RATE: 18 BRPM

## 2025-06-05 PROCEDURE — G0300 HHS/HOSPICE OF LPN EA 15 MIN: HCPCS

## 2025-06-05 ASSESSMENT — ENCOUNTER SYMPTOMS
SUBJECTIVE PAIN PROGRESSION: UNCHANGED
LAST BOWEL MOVEMENT: 67361
DYSPNEA ON EXERTION: 1
PERSON REPORTING PAIN: PATIENT
BOWEL PATTERN NORMAL: 1
HIGHEST PAIN SEVERITY IN PAST 24 HOURS: 3/10
DENIES PAIN: 1
LOWEST PAIN SEVERITY IN PAST 24 HOURS: 0/10
PAIN SEVERITY GOAL: 0/10
APPETITE LEVEL: GOOD
SHORTNESS OF BREATH: 1
CHANGE IN APPETITE: UNCHANGED
DYSPNEA ACTIVITY LEVEL: AFTER AMBULATING MORE THAN 20 FT
SKIN LESIONS: 1
MUSCLE WEAKNESS: 1
FATIGUES EASILY: 1
LOWER EXTREMITY EDEMA: 1

## 2025-06-05 ASSESSMENT — ACTIVITIES OF DAILY LIVING (ADL)
CURRENT_FUNCTION: STAND BY ASSIST
MONEY MANAGEMENT (EXPENSES/BILLS): NEEDS ASSISTANCE
AMBULATION ASSISTANCE: STAND BY ASSIST

## 2025-06-05 NOTE — HOME HEALTH
Patient seen today for routine suprapubic catheter change. Removed old catheter. Inserted new #16F suprapubic catheter, balloon inflated with 10ml SNS, without complication. Patient tolerated procedure well. Observed clear yellow-colored urine flowing freely into the tube bag. Fasting blood sugar today was 86. All VS WNL.

## 2025-06-06 ENCOUNTER — APPOINTMENT (OUTPATIENT)
Dept: PRIMARY CARE | Facility: CLINIC | Age: 85
End: 2025-06-06
Payer: MEDICARE

## 2025-06-11 ENCOUNTER — HOME CARE VISIT (OUTPATIENT)
Dept: HOME HEALTH SERVICES | Facility: HOME HEALTH | Age: 85
End: 2025-06-11
Payer: MEDICARE

## 2025-06-11 VITALS
DIASTOLIC BLOOD PRESSURE: 60 MMHG | OXYGEN SATURATION: 94 % | TEMPERATURE: 98.1 F | RESPIRATION RATE: 18 BRPM | SYSTOLIC BLOOD PRESSURE: 118 MMHG | HEART RATE: 74 BPM

## 2025-06-11 DIAGNOSIS — I10 PRIMARY HYPERTENSION: ICD-10-CM

## 2025-06-11 PROCEDURE — G0299 HHS/HOSPICE OF RN EA 15 MIN: HCPCS

## 2025-06-11 ASSESSMENT — PAIN SCALES - PAIN ASSESSMENT IN ADVANCED DEMENTIA (PAINAD): BREATHING: 0

## 2025-06-11 ASSESSMENT — ENCOUNTER SYMPTOMS
MUSCLE WEAKNESS: 1
APPETITE LEVEL: FAIR
PAIN SEVERITY GOAL: 0/10
HIGHEST PAIN SEVERITY IN PAST 24 HOURS: 4/10
LOWEST PAIN SEVERITY IN PAST 24 HOURS: 2/10

## 2025-06-11 ASSESSMENT — ACTIVITIES OF DAILY LIVING (ADL)
ENTERING_EXITING_HOME: STAND BY ASSIST
OASIS_M1830: 03

## 2025-06-16 ENCOUNTER — HOME CARE VISIT (OUTPATIENT)
Dept: HOME HEALTH SERVICES | Facility: HOME HEALTH | Age: 85
End: 2025-06-16
Payer: MEDICARE

## 2025-06-16 VITALS
SYSTOLIC BLOOD PRESSURE: 138 MMHG | TEMPERATURE: 97.8 F | OXYGEN SATURATION: 94 % | DIASTOLIC BLOOD PRESSURE: 70 MMHG | HEART RATE: 64 BPM | RESPIRATION RATE: 20 BRPM

## 2025-06-16 PROCEDURE — G0300 HHS/HOSPICE OF LPN EA 15 MIN: HCPCS

## 2025-06-16 ASSESSMENT — ENCOUNTER SYMPTOMS
DRY SKIN: 1
LOWEST PAIN SEVERITY IN PAST 24 HOURS: 2/10
HIGHEST PAIN SEVERITY IN PAST 24 HOURS: 6/10
PAIN: 1
PAIN LOCATION: LEFT KNEE
FATIGUE: 1
APPETITE LEVEL: GOOD
BOWEL PATTERN NORMAL: 1
SKIN LESIONS: 1
PAIN LOCATION: RIGHT KNEE
FATIGUES EASILY: 1
LIMITED RANGE OF MOTION: 1
CHANGE IN APPETITE: UNCHANGED
PAIN SEVERITY GOAL: 0/10
PERSON REPORTING PAIN: PATIENT
SUBJECTIVE PAIN PROGRESSION: UNCHANGED
MUSCLE WEAKNESS: 1
LAST BOWEL MOVEMENT: 67372
PAIN LOCATION - PAIN SEVERITY: 4/10
PAIN LOCATION - PAIN SEVERITY: 4/10
LOWER EXTREMITY EDEMA: 1

## 2025-06-16 ASSESSMENT — ACTIVITIES OF DAILY LIVING (ADL): MONEY MANAGEMENT (EXPENSES/BILLS): NEEDS ASSISTANCE

## 2025-06-16 NOTE — HOME HEALTH
While in patient's home providing care for patient observed spouse on couch with rapid shallow breathing, head tilted to the side and her O2 off. Spouse very difficult to rouse, Patient stated she had been growing weaker daily and was not eating or drinking POX checked with results of 61%. Applied O2 via mask at 3L and called 911. While waiting for squad to arrive, checked B/P with results of 88/52. POX increased to 96% on 3L O2, spouse remained drowsy with confusion and was coughing up pale green mucus. Crackles to B/L lower lobes. EMS arrived and patient transported to [facility]. Contacted patient's brother to assist patient with transport to hospital as patient is unable to drive.

## 2025-06-16 NOTE — HOME HEALTH
Patient seen today for irrigation of suprapubic catheter due to sediment in tubing. Suprapubic catheter observed to have heavy sediment and tubing and in leg bag. Irrigated catheter with 50ml SNS without complication, patient tolerated procedure well. Observed clear, yellow-colored urine flowing freely through catheter tubing to leg bag post-irrigation. Patient does occasionally have blood-tinged urine, urologist is aware. +1 non-pitting edema to B/L LE. All VS WNL.

## 2025-06-18 RX ORDER — AMLODIPINE BESYLATE 5 MG/1
5 TABLET ORAL DAILY
Qty: 90 TABLET | Refills: 1 | OUTPATIENT
Start: 2025-06-18

## 2025-07-02 ENCOUNTER — HOME CARE VISIT (OUTPATIENT)
Dept: HOME HEALTH SERVICES | Facility: HOME HEALTH | Age: 85
End: 2025-07-02
Payer: MEDICARE

## 2025-07-02 VITALS
TEMPERATURE: 97.7 F | DIASTOLIC BLOOD PRESSURE: 82 MMHG | RESPIRATION RATE: 20 BRPM | OXYGEN SATURATION: 95 % | HEART RATE: 72 BPM | SYSTOLIC BLOOD PRESSURE: 144 MMHG

## 2025-07-02 PROCEDURE — G0300 HHS/HOSPICE OF LPN EA 15 MIN: HCPCS

## 2025-07-02 ASSESSMENT — ENCOUNTER SYMPTOMS
SKIN LESIONS: 1
FORGETFULNESS: 1
LOWEST PAIN SEVERITY IN PAST 24 HOURS: 0/10
APPETITE LEVEL: FAIR
PERSON REPORTING PAIN: PATIENT
DESCRIPTION OF MEMORY LOSS: SHORT TERM
FATIGUE: 1
HIGHEST PAIN SEVERITY IN PAST 24 HOURS: 3/10
BOWEL PATTERN NORMAL: 1
LAST BOWEL MOVEMENT: 67388
CHANGE IN APPETITE: UNCHANGED
DENIES PAIN: 1
SUBJECTIVE PAIN PROGRESSION: UNCHANGED
MUSCLE WEAKNESS: 1
LOWER EXTREMITY EDEMA: 1
LIMITED RANGE OF MOTION: 1
FATIGUES EASILY: 1
PAIN SEVERITY GOAL: 0/10

## 2025-07-02 ASSESSMENT — ACTIVITIES OF DAILY LIVING (ADL): MONEY MANAGEMENT (EXPENSES/BILLS): INDEPENDENT

## 2025-07-02 NOTE — HOME HEALTH
Patient seen today to assist with changing leaking leg bag. Leg bag plastic tore at point where drainage valve is attached. A new leg bag was put in place, educated patient on how to change bags and he was able to offer a return demonstration. All VS WNL. Observed a bandaid to left shin during assessment, patient stated he had a “blister” several days ago and “popped it” then covered with a bandaid. When bandaid was removed observed an open area of granulation, cleansed with SNS, took pic and measurements and Case Communication sent to PCP, Dr. Triston Samson. Covered area with a DSD to pad and protect until wound care orders are received. Educated patient that blisters must not be touched and there is a high risk of infection. Reinforced prior education on elevation of B/L LE frequently throughout the day and monitoring sodium intake. He verbalized understanding to all teaching.

## 2025-07-02 NOTE — Clinical Note
Observed a bandaid to left shin during assessment, patient stated he had a “blister” several days ago and “popped it” then covered with a bandaid. When bandaid was removed observed an open area of granulation, cleansed with SNS, took pic and measurements and Case Communication sent to PCP, Dr. Triston Samson. Covered area with a DSD to pad and protect until wound care orders are received. Educated patient that blisters must not be touched and there is a high risk of infection. Reinforced prior education on elevation of B/L LE frequently throughout the day and monitoring sodium intake. He verbalized understanding to all teaching.

## 2025-07-03 ENCOUNTER — HOME CARE VISIT (OUTPATIENT)
Dept: HOME HEALTH SERVICES | Facility: HOME HEALTH | Age: 85
End: 2025-07-03
Payer: MEDICARE

## 2025-07-03 DIAGNOSIS — E66.01 SEVERE OBESITY (BMI 35.0-39.9) WITH COMORBIDITY (MULTI): Primary | ICD-10-CM

## 2025-07-03 DIAGNOSIS — I50.22 CHRONIC SYSTOLIC HEART FAILURE: ICD-10-CM

## 2025-07-03 DIAGNOSIS — C61 PROSTATE CANCER (MULTI): ICD-10-CM

## 2025-07-04 DIAGNOSIS — N18.32 TYPE 2 DIABETES MELLITUS WITH STAGE 3B CHRONIC KIDNEY DISEASE, WITH LONG-TERM CURRENT USE OF INSULIN (MULTI): ICD-10-CM

## 2025-07-04 DIAGNOSIS — Z79.4 TYPE 2 DIABETES MELLITUS WITH STAGE 3B CHRONIC KIDNEY DISEASE, WITH LONG-TERM CURRENT USE OF INSULIN (MULTI): ICD-10-CM

## 2025-07-04 DIAGNOSIS — E11.22 TYPE 2 DIABETES MELLITUS WITH STAGE 3B CHRONIC KIDNEY DISEASE, WITH LONG-TERM CURRENT USE OF INSULIN (MULTI): ICD-10-CM

## 2025-07-04 DIAGNOSIS — I50.22 CHRONIC SYSTOLIC HEART FAILURE: ICD-10-CM

## 2025-07-04 DIAGNOSIS — I25.10 CORONARY ARTERY DISEASE INVOLVING NATIVE CORONARY ARTERY OF NATIVE HEART WITHOUT ANGINA PECTORIS: ICD-10-CM

## 2025-07-07 ENCOUNTER — HOME CARE VISIT (OUTPATIENT)
Dept: HOME HEALTH SERVICES | Facility: HOME HEALTH | Age: 85
End: 2025-07-07
Payer: MEDICARE

## 2025-07-07 VITALS
SYSTOLIC BLOOD PRESSURE: 138 MMHG | HEART RATE: 82 BPM | DIASTOLIC BLOOD PRESSURE: 60 MMHG | OXYGEN SATURATION: 94 % | TEMPERATURE: 98.9 F

## 2025-07-07 PROCEDURE — G0299 HHS/HOSPICE OF RN EA 15 MIN: HCPCS

## 2025-07-07 RX ORDER — ATORVASTATIN CALCIUM 80 MG/1
80 TABLET, FILM COATED ORAL DAILY
Qty: 90 TABLET | Refills: 3 | Status: SHIPPED | OUTPATIENT
Start: 2025-07-07

## 2025-07-07 RX ORDER — ISOSORBIDE MONONITRATE 30 MG/1
30 TABLET, EXTENDED RELEASE ORAL DAILY
Qty: 90 TABLET | Refills: 3 | Status: SHIPPED | OUTPATIENT
Start: 2025-07-07

## 2025-07-07 RX ORDER — TORSEMIDE 20 MG/1
20 TABLET ORAL DAILY
Qty: 90 TABLET | Refills: 3 | Status: SHIPPED | OUTPATIENT
Start: 2025-07-07

## 2025-07-07 ASSESSMENT — ENCOUNTER SYMPTOMS
APPETITE LEVEL: FAIR
HIGHEST PAIN SEVERITY IN PAST 24 HOURS: 0/10
LOWER EXTREMITY EDEMA: 1
MUSCLE WEAKNESS: 1
LOWEST PAIN SEVERITY IN PAST 24 HOURS: 0/10
PAIN SEVERITY GOAL: 0/10

## 2025-07-07 ASSESSMENT — PAIN SCALES - PAIN ASSESSMENT IN ADVANCED DEMENTIA (PAINAD): BREATHING: 0

## 2025-07-07 NOTE — Clinical Note
Hello. This is a visiting nurse. I saw the pt today. Hi has ankle edema +4 and stasis ulcers. He his on Torcemide 20 mg.  He probably needs higher dose of duaretics. He said he missed his appointment with PCP. I instructed him to schedule an appointment but i am not sure if he will follow recomendation

## 2025-07-11 ENCOUNTER — APPOINTMENT (OUTPATIENT)
Dept: PRIMARY CARE | Facility: CLINIC | Age: 85
End: 2025-07-11
Payer: MEDICARE

## 2025-07-14 ENCOUNTER — APPOINTMENT (OUTPATIENT)
Dept: PRIMARY CARE | Facility: CLINIC | Age: 85
End: 2025-07-14
Payer: MEDICARE

## 2025-07-14 ENCOUNTER — HOME CARE VISIT (OUTPATIENT)
Dept: HOME HEALTH SERVICES | Facility: HOME HEALTH | Age: 85
End: 2025-07-14
Payer: MEDICARE

## 2025-07-14 VITALS
DIASTOLIC BLOOD PRESSURE: 69 MMHG | HEIGHT: 70 IN | WEIGHT: 220 LBS | SYSTOLIC BLOOD PRESSURE: 126 MMHG | BODY MASS INDEX: 31.5 KG/M2 | OXYGEN SATURATION: 95 % | HEART RATE: 70 BPM

## 2025-07-14 DIAGNOSIS — T14.8XXA WOUND OF SKIN: ICD-10-CM

## 2025-07-14 DIAGNOSIS — N18.32 TYPE 2 DIABETES MELLITUS WITH STAGE 3B CHRONIC KIDNEY DISEASE, WITH LONG-TERM CURRENT USE OF INSULIN (MULTI): ICD-10-CM

## 2025-07-14 DIAGNOSIS — I50.22 CHRONIC SYSTOLIC HEART FAILURE: Primary | ICD-10-CM

## 2025-07-14 DIAGNOSIS — I50.22 CHRONIC SYSTOLIC HEART FAILURE: ICD-10-CM

## 2025-07-14 DIAGNOSIS — Z79.4 TYPE 2 DIABETES MELLITUS WITH STAGE 3B CHRONIC KIDNEY DISEASE, WITH LONG-TERM CURRENT USE OF INSULIN (MULTI): ICD-10-CM

## 2025-07-14 DIAGNOSIS — Z13.6 SCREENING FOR CARDIOVASCULAR CONDITION: ICD-10-CM

## 2025-07-14 DIAGNOSIS — R60.0 BILATERAL EDEMA OF LOWER EXTREMITY: ICD-10-CM

## 2025-07-14 DIAGNOSIS — I10 PRIMARY HYPERTENSION: ICD-10-CM

## 2025-07-14 DIAGNOSIS — I48.0 PAROXYSMAL ATRIAL FIBRILLATION (MULTI): ICD-10-CM

## 2025-07-14 DIAGNOSIS — R73.9 HYPERGLYCEMIA: ICD-10-CM

## 2025-07-14 DIAGNOSIS — R29.898 WEAKNESS OF BOTH LOWER EXTREMITIES: ICD-10-CM

## 2025-07-14 DIAGNOSIS — E11.22 TYPE 2 DIABETES MELLITUS WITH STAGE 3B CHRONIC KIDNEY DISEASE, WITH LONG-TERM CURRENT USE OF INSULIN (MULTI): ICD-10-CM

## 2025-07-14 PROBLEM — R22.43 LOCALIZED SWELLING, MASS AND LUMP, LOWER LIMB, BILATERAL: Status: RESOLVED | Noted: 2025-05-15 | Resolved: 2025-07-14

## 2025-07-14 PROCEDURE — 1036F TOBACCO NON-USER: CPT | Performed by: INTERNAL MEDICINE

## 2025-07-14 PROCEDURE — 1158F ADVNC CARE PLAN TLK DOCD: CPT | Performed by: INTERNAL MEDICINE

## 2025-07-14 PROCEDURE — 1125F AMNT PAIN NOTED PAIN PRSNT: CPT | Performed by: INTERNAL MEDICINE

## 2025-07-14 PROCEDURE — 99214 OFFICE O/P EST MOD 30 MIN: CPT | Performed by: INTERNAL MEDICINE

## 2025-07-14 PROCEDURE — 3074F SYST BP LT 130 MM HG: CPT | Performed by: INTERNAL MEDICINE

## 2025-07-14 PROCEDURE — 1159F MED LIST DOCD IN RCRD: CPT | Performed by: INTERNAL MEDICINE

## 2025-07-14 PROCEDURE — 3078F DIAST BP <80 MM HG: CPT | Performed by: INTERNAL MEDICINE

## 2025-07-14 PROCEDURE — G2211 COMPLEX E/M VISIT ADD ON: HCPCS | Performed by: INTERNAL MEDICINE

## 2025-07-14 PROCEDURE — 1157F ADVNC CARE PLAN IN RCRD: CPT | Performed by: INTERNAL MEDICINE

## 2025-07-14 PROCEDURE — 1123F ACP DISCUSS/DSCN MKR DOCD: CPT | Performed by: INTERNAL MEDICINE

## 2025-07-14 RX ORDER — PEN NEEDLE, DIABETIC 30 GX3/16"
NEEDLE, DISPOSABLE MISCELLANEOUS
Qty: 100 EACH | Refills: 2 | Status: SHIPPED | OUTPATIENT
Start: 2025-07-14

## 2025-07-14 RX ORDER — AMLODIPINE BESYLATE 5 MG/1
5 TABLET ORAL DAILY
Qty: 90 TABLET | Refills: 1 | Status: SHIPPED | OUTPATIENT
Start: 2025-07-14 | End: 2026-01-10

## 2025-07-14 RX ORDER — TORSEMIDE 20 MG/1
60 TABLET ORAL DAILY
Qty: 270 TABLET | Refills: 3 | Status: SHIPPED | OUTPATIENT
Start: 2025-07-14

## 2025-07-14 RX ORDER — METOPROLOL TARTRATE 25 MG/1
25 TABLET, FILM COATED ORAL 2 TIMES DAILY
Qty: 180 TABLET | Refills: 1 | Status: SHIPPED | OUTPATIENT
Start: 2025-07-14

## 2025-07-14 SDOH — ECONOMIC STABILITY: FOOD INSECURITY: WITHIN THE PAST 12 MONTHS, YOU WORRIED THAT YOUR FOOD WOULD RUN OUT BEFORE YOU GOT MONEY TO BUY MORE.: NEVER TRUE

## 2025-07-14 SDOH — ECONOMIC STABILITY: FOOD INSECURITY: WITHIN THE PAST 12 MONTHS, THE FOOD YOU BOUGHT JUST DIDN'T LAST AND YOU DIDN'T HAVE MONEY TO GET MORE.: NEVER TRUE

## 2025-07-14 ASSESSMENT — PAIN SCALES - GENERAL: PAINLEVEL_OUTOF10: 4

## 2025-07-14 NOTE — PROGRESS NOTES
"Subjective   Patient ID: Dennis Galo is a 85 y.o. male who presents for Blister (Both legs).    HPI   Legs more swollen  Has blisters on legs.  Home Care still coming out, changing catheter but last leg dressing change was a week ago or so. No drainage from legs. No fevers.    Weight down 10 pound since last visit in Oct. On 40mg torsemide per day.    He is able to take insulin and trulicity on his own without his wife around. Wife passed away last week. Patient considering assisted living.    Using walker. Legs feel weaker.    Review of Systems    Objective   /69 (BP Location: Right arm, Patient Position: Sitting)   Pulse 70   Ht 1.778 m (5' 10\")   Wt 99.8 kg (220 lb)   SpO2 95%   BMI 31.57 kg/m²     Physical Exam  In wheelchair  NAD  RRR  Lungs clear  4+ LE edema with noninfected ulcerations anterior shins without drainage or erythema.    Assessment/Plan   Problem List Items Addressed This Visit           ICD-10-CM    Bilateral edema of lower extremity R60.0    Relevant Orders    Referral to Home Care    Systolic heart failure - Primary I50.20    Relevant Medications    torsemide (Demadex) 20 mg tablet    Other Relevant Orders    CBC    Comprehensive Metabolic Panel    Disability Placard    Wound of skin T14.8XXA    Relevant Orders    Referral to Home Care    Type 2 diabetes mellitus with chronic kidney disease, with long-term current use of insulin (Multi) E11.22, Z79.4    Relevant Medications    pen needle, diabetic (BD Arelis 2nd Gen Pen Needle) 32 gauge x 5/32\" needle     Other Visit Diagnoses         Codes      Hyperglycemia     R73.9    Relevant Orders    Hemoglobin A1C      Screening for cardiovascular condition     Z13.6    Relevant Orders    Lipid Panel      Weakness of both lower extremities     R29.898    Relevant Orders    Disability Placard               Increase torsemide to 60mg/day  Labs in a week  Home Care ordered to start PT and make sure his wounds are being addressed. He is " already getting this for his catheter changes.  Follow up 6 weeks.

## 2025-07-18 ENCOUNTER — HOME CARE VISIT (OUTPATIENT)
Dept: HOME HEALTH SERVICES | Facility: HOME HEALTH | Age: 85
End: 2025-07-18
Payer: MEDICARE

## 2025-07-21 ENCOUNTER — HOME CARE VISIT (OUTPATIENT)
Dept: HOME HEALTH SERVICES | Facility: HOME HEALTH | Age: 85
End: 2025-07-21
Payer: MEDICARE

## 2025-07-21 VITALS
HEART RATE: 80 BPM | TEMPERATURE: 98.2 F | OXYGEN SATURATION: 94 % | SYSTOLIC BLOOD PRESSURE: 131 MMHG | RESPIRATION RATE: 18 BRPM | DIASTOLIC BLOOD PRESSURE: 87 MMHG

## 2025-07-21 PROCEDURE — G0300 HHS/HOSPICE OF LPN EA 15 MIN: HCPCS

## 2025-07-21 ASSESSMENT — ENCOUNTER SYMPTOMS
CHANGE IN APPETITE: UNCHANGED
DENIES PAIN: 1
PERSON REPORTING PAIN: PATIENT
APPETITE LEVEL: GOOD
LOWEST PAIN SEVERITY IN PAST 24 HOURS: 0/10
HIGHEST PAIN SEVERITY IN PAST 24 HOURS: 0/10
PAIN SEVERITY GOAL: 0/10

## 2025-07-22 DIAGNOSIS — E11.22 TYPE 2 DIABETES MELLITUS WITH STAGE 3B CHRONIC KIDNEY DISEASE, WITH LONG-TERM CURRENT USE OF INSULIN (MULTI): ICD-10-CM

## 2025-07-22 DIAGNOSIS — Z79.4 TYPE 2 DIABETES MELLITUS WITH STAGE 3B CHRONIC KIDNEY DISEASE, WITH LONG-TERM CURRENT USE OF INSULIN (MULTI): ICD-10-CM

## 2025-07-22 DIAGNOSIS — N18.32 TYPE 2 DIABETES MELLITUS WITH STAGE 3B CHRONIC KIDNEY DISEASE, WITH LONG-TERM CURRENT USE OF INSULIN (MULTI): ICD-10-CM

## 2025-07-22 RX ORDER — BLOOD SUGAR DIAGNOSTIC
STRIP MISCELLANEOUS
Qty: 100 STRIP | Refills: 1 | Status: SHIPPED | OUTPATIENT
Start: 2025-07-22

## 2025-07-22 NOTE — TELEPHONE ENCOUNTER
Rx Refill Request Telephone Encounter    Name:  Dennis KO Iraj  :  [id number]  Medication Name:  OneTouch Test Strips  Quantity : 100 Unit Box  Directions : Use once daily as directed  Specific Pharmacy location:  [facility] in [place]  Date of last appointment:  25  Date of next appointment:  25

## 2025-07-23 ENCOUNTER — HOME CARE VISIT (OUTPATIENT)
Dept: HOME HEALTH SERVICES | Facility: HOME HEALTH | Age: 85
End: 2025-07-23
Payer: MEDICARE

## 2025-07-23 PROCEDURE — G0155 HHCP-SVS OF CSW,EA 15 MIN: HCPCS

## 2025-07-23 SDOH — SOCIAL STABILITY: SOCIAL NETWORK: HELP FROM FAMILY/FRIENDS: 2 BROTHERS AND THEIR FAMILY.

## 2025-07-24 ASSESSMENT — ACTIVITIES OF DAILY LIVING (ADL)
BATHING_REQUIRES_ASSISTANCE: 1
AMBULATION_REQUIRES_ASSISTANCE: 1
LAUNDRY_REQUIRES_ASSISTANCE: 1
SHOPPING_REQUIRES_ASSISTANCE: 1
EATING_REQUIRES_ASSISTANCE: 1
DRESSING_REQUIRES_ASSISTANCE: 1

## 2025-07-26 DIAGNOSIS — B37.9 CANDIDIASIS: ICD-10-CM

## 2025-07-28 RX ORDER — NYSTATIN TOPICAL POWDER 100000 U/G
POWDER TOPICAL 2 TIMES DAILY
Qty: 60 G | Refills: 0 | Status: SHIPPED | OUTPATIENT
Start: 2025-07-28

## 2025-07-30 ENCOUNTER — HOME CARE VISIT (OUTPATIENT)
Dept: HOME HEALTH SERVICES | Facility: HOME HEALTH | Age: 85
End: 2025-07-30
Payer: MEDICARE

## 2025-07-30 VITALS
HEART RATE: 73 BPM | RESPIRATION RATE: 18 BRPM | TEMPERATURE: 98.4 F | DIASTOLIC BLOOD PRESSURE: 67 MMHG | OXYGEN SATURATION: 98 % | SYSTOLIC BLOOD PRESSURE: 135 MMHG

## 2025-07-30 LAB
ALBUMIN SERPL-MCNC: 3.5 G/DL (ref 3.6–5.1)
ALP SERPL-CCNC: 188 U/L (ref 35–144)
ALT SERPL-CCNC: 11 U/L (ref 9–46)
ANION GAP SERPL CALCULATED.4IONS-SCNC: 9 MMOL/L (CALC) (ref 7–17)
AST SERPL-CCNC: 15 U/L (ref 10–35)
BILIRUB SERPL-MCNC: 1.1 MG/DL (ref 0.2–1.2)
BUN SERPL-MCNC: 31 MG/DL (ref 7–25)
CALCIUM SERPL-MCNC: 8.3 MG/DL (ref 8.6–10.3)
CHLORIDE SERPL-SCNC: 103 MMOL/L (ref 98–110)
CHOLEST SERPL-MCNC: 82 MG/DL
CHOLEST/HDLC SERPL: 2.2 (CALC)
CO2 SERPL-SCNC: 28 MMOL/L (ref 20–32)
CREAT SERPL-MCNC: 2.55 MG/DL (ref 0.7–1.22)
EGFRCR SERPLBLD CKD-EPI 2021: 24 ML/MIN/1.73M2
ERYTHROCYTE [DISTWIDTH] IN BLOOD BY AUTOMATED COUNT: 14.1 % (ref 11–15)
EST. AVERAGE GLUCOSE BLD GHB EST-MCNC: 146 MG/DL
EST. AVERAGE GLUCOSE BLD GHB EST-SCNC: 8.1 MMOL/L
GLUCOSE SERPL-MCNC: 58 MG/DL (ref 65–99)
HBA1C MFR BLD: 6.7 %
HCT VFR BLD AUTO: 37.2 % (ref 38.5–50)
HDLC SERPL-MCNC: 38 MG/DL
HGB BLD-MCNC: 11.5 G/DL (ref 13.2–17.1)
LDLC SERPL CALC-MCNC: 31 MG/DL (CALC)
MCH RBC QN AUTO: 28.8 PG (ref 27–33)
MCHC RBC AUTO-ENTMCNC: 30.9 G/DL (ref 32–36)
MCV RBC AUTO: 93.2 FL (ref 80–100)
NONHDLC SERPL-MCNC: 44 MG/DL (CALC)
PLATELET # BLD AUTO: 247 THOUSAND/UL (ref 140–400)
PMV BLD REES-ECKER: 11.1 FL (ref 7.5–12.5)
POTASSIUM SERPL-SCNC: 4.4 MMOL/L (ref 3.5–5.3)
PROT SERPL-MCNC: 6.3 G/DL (ref 6.1–8.1)
RBC # BLD AUTO: 3.99 MILLION/UL (ref 4.2–5.8)
SODIUM SERPL-SCNC: 140 MMOL/L (ref 135–146)
TRIGL SERPL-MCNC: 53 MG/DL
WBC # BLD AUTO: 6 THOUSAND/UL (ref 3.8–10.8)

## 2025-07-30 PROCEDURE — G0300 HHS/HOSPICE OF LPN EA 15 MIN: HCPCS

## 2025-07-31 ASSESSMENT — ENCOUNTER SYMPTOMS
PERSON REPORTING PAIN: PATIENT
DENIES PAIN: 1
CHANGE IN APPETITE: UNCHANGED
APPETITE LEVEL: GOOD

## 2025-08-03 ENCOUNTER — RESULTS FOLLOW-UP (OUTPATIENT)
Dept: PRIMARY CARE | Facility: CLINIC | Age: 85
End: 2025-08-03
Payer: MEDICARE

## 2025-08-04 ENCOUNTER — HOME CARE VISIT (OUTPATIENT)
Dept: HOME HEALTH SERVICES | Facility: HOME HEALTH | Age: 85
End: 2025-08-04
Payer: MEDICARE

## 2025-08-04 VITALS
OXYGEN SATURATION: 96 % | HEART RATE: 75 BPM | RESPIRATION RATE: 18 BRPM | TEMPERATURE: 98.8 F | DIASTOLIC BLOOD PRESSURE: 60 MMHG | SYSTOLIC BLOOD PRESSURE: 120 MMHG

## 2025-08-04 PROCEDURE — G0299 HHS/HOSPICE OF RN EA 15 MIN: HCPCS

## 2025-08-04 ASSESSMENT — ENCOUNTER SYMPTOMS
PAIN SEVERITY GOAL: 0/10
HIGHEST PAIN SEVERITY IN PAST 24 HOURS: 0/10
APPETITE LEVEL: FAIR
LOWER EXTREMITY EDEMA: 1
LOWEST PAIN SEVERITY IN PAST 24 HOURS: 0/10
MUSCLE WEAKNESS: 1

## 2025-08-04 ASSESSMENT — PAIN SCALES - PAIN ASSESSMENT IN ADVANCED DEMENTIA (PAINAD): BREATHING: 0

## 2025-08-07 NOTE — TELEPHONE ENCOUNTER
----- Message from Triston Samson sent at 8/3/2025 10:02 AM EDT -----  Creatinine level up slightly but otherwise rest of labs stable. Continue same meds and he should make sure he is hydrating well.  ----- Message -----  From: [employer] Results In  Sent: 7/30/2025   5:27 AM EDT  To: Triston Samson MD

## 2025-08-10 ENCOUNTER — HOME CARE VISIT (OUTPATIENT)
Dept: HOME HEALTH SERVICES | Facility: HOME HEALTH | Age: 85
End: 2025-08-10
Payer: MEDICARE

## 2025-08-10 VITALS
TEMPERATURE: 98.3 F | RESPIRATION RATE: 18 BRPM | SYSTOLIC BLOOD PRESSURE: 120 MMHG | OXYGEN SATURATION: 98 % | DIASTOLIC BLOOD PRESSURE: 60 MMHG | HEART RATE: 57 BPM

## 2025-08-10 PROCEDURE — G0299 HHS/HOSPICE OF RN EA 15 MIN: HCPCS

## 2025-08-10 ASSESSMENT — ENCOUNTER SYMPTOMS
AGGRESSION WITHIN DEFINED LIMITS: 1
APPETITE LEVEL: FAIR
PAIN SEVERITY GOAL: 0/10
HIGHEST PAIN SEVERITY IN PAST 24 HOURS: 4/10
LOWER EXTREMITY EDEMA: 1
SLEEP QUALITY: ADEQUATE
MUSCLE WEAKNESS: 1
LOWEST PAIN SEVERITY IN PAST 24 HOURS: 0/10
ANGER WITHIN DEFINED LIMITS: 1

## 2025-08-10 ASSESSMENT — ACTIVITIES OF DAILY LIVING (ADL)
ENTERING_EXITING_HOME: STAND BY ASSIST
OASIS_M1830: 03

## 2025-08-10 ASSESSMENT — PAIN SCALES - PAIN ASSESSMENT IN ADVANCED DEMENTIA (PAINAD): BREATHING: 0

## 2025-08-11 ENCOUNTER — TELEPHONE (OUTPATIENT)
Dept: PRIMARY CARE | Facility: CLINIC | Age: 85
End: 2025-08-11
Payer: MEDICARE

## 2025-08-12 ENCOUNTER — HOME CARE VISIT (OUTPATIENT)
Dept: HOME HEALTH SERVICES | Facility: HOME HEALTH | Age: 85
End: 2025-08-12
Payer: MEDICARE

## 2025-08-12 VITALS
DIASTOLIC BLOOD PRESSURE: 66 MMHG | SYSTOLIC BLOOD PRESSURE: 128 MMHG | TEMPERATURE: 98.2 F | RESPIRATION RATE: 18 BRPM | OXYGEN SATURATION: 95 % | HEART RATE: 69 BPM

## 2025-08-12 PROCEDURE — G0300 HHS/HOSPICE OF LPN EA 15 MIN: HCPCS

## 2025-08-12 ASSESSMENT — ACTIVITIES OF DAILY LIVING (ADL)
AMBULATION ASSISTANCE: 1
TOILETING: 1
BATHING ASSESSED: 1
DRESSING_UB_CURRENT_FUNCTION: INDEPENDENT
TOILETING: INDEPENDENT
FEEDING: INDEPENDENT
BATHING_CURRENT_FUNCTION: MINIMUM ASSIST
DRESSING_LB_CURRENT_FUNCTION: INDEPENDENT
AMBULATION ASSISTANCE: INDEPENDENT
FEEDING ASSESSED: 1

## 2025-08-12 ASSESSMENT — ENCOUNTER SYMPTOMS
BOWEL PATTERN NORMAL: 1
PAIN LOCATION: GENERALIZED
LAST BOWEL MOVEMENT: 67428
SUBJECTIVE PAIN PROGRESSION: UNCHANGED
PAIN: 1
APPETITE LEVEL: GOOD
FATIGUES EASILY: 1
CHANGE IN APPETITE: UNCHANGED
LIMITED RANGE OF MOTION: 1
PERSON REPORTING PAIN: PATIENT
PAIN LOCATION - PAIN SEVERITY: 3/10
MUSCLE WEAKNESS: 1
DYSPNEA ON EXERTION: 1

## 2025-08-18 ENCOUNTER — HOME CARE VISIT (OUTPATIENT)
Dept: HOME HEALTH SERVICES | Facility: HOME HEALTH | Age: 85
End: 2025-08-18
Payer: MEDICARE

## 2025-08-18 VITALS
HEART RATE: 70 BPM | SYSTOLIC BLOOD PRESSURE: 124 MMHG | RESPIRATION RATE: 18 BRPM | DIASTOLIC BLOOD PRESSURE: 70 MMHG | TEMPERATURE: 98.6 F | OXYGEN SATURATION: 96 %

## 2025-08-18 PROCEDURE — G0300 HHS/HOSPICE OF LPN EA 15 MIN: HCPCS

## 2025-08-18 ASSESSMENT — ENCOUNTER SYMPTOMS
APPETITE LEVEL: GOOD
LOWEST PAIN SEVERITY IN PAST 24 HOURS: 0/10
DENIES PAIN: 1
PERSON REPORTING PAIN: PATIENT
CHANGE IN APPETITE: UNCHANGED
HIGHEST PAIN SEVERITY IN PAST 24 HOURS: 0/10
PAIN SEVERITY GOAL: 0/10

## 2025-08-21 ENCOUNTER — APPOINTMENT (OUTPATIENT)
Dept: PRIMARY CARE | Facility: CLINIC | Age: 85
End: 2025-08-21
Payer: MEDICARE

## 2025-08-21 VITALS
WEIGHT: 220 LBS | SYSTOLIC BLOOD PRESSURE: 127 MMHG | DIASTOLIC BLOOD PRESSURE: 66 MMHG | BODY MASS INDEX: 31.5 KG/M2 | HEIGHT: 70 IN | HEART RATE: 69 BPM | OXYGEN SATURATION: 94 %

## 2025-08-21 DIAGNOSIS — C61 PROSTATE CANCER (MULTI): ICD-10-CM

## 2025-08-21 DIAGNOSIS — G89.29 CHRONIC BILATERAL LOW BACK PAIN WITHOUT SCIATICA: ICD-10-CM

## 2025-08-21 DIAGNOSIS — R26.81 GAIT INSTABILITY: Primary | ICD-10-CM

## 2025-08-21 DIAGNOSIS — M54.50 CHRONIC BILATERAL LOW BACK PAIN WITHOUT SCIATICA: ICD-10-CM

## 2025-08-21 DIAGNOSIS — I50.22 CHRONIC SYSTOLIC HEART FAILURE: ICD-10-CM

## 2025-08-21 PROCEDURE — 3074F SYST BP LT 130 MM HG: CPT | Performed by: INTERNAL MEDICINE

## 2025-08-21 PROCEDURE — 99213 OFFICE O/P EST LOW 20 MIN: CPT | Performed by: INTERNAL MEDICINE

## 2025-08-21 PROCEDURE — G2211 COMPLEX E/M VISIT ADD ON: HCPCS | Performed by: INTERNAL MEDICINE

## 2025-08-21 PROCEDURE — 3078F DIAST BP <80 MM HG: CPT | Performed by: INTERNAL MEDICINE

## 2025-08-25 ENCOUNTER — HOME CARE VISIT (OUTPATIENT)
Dept: HOME HEALTH SERVICES | Facility: HOME HEALTH | Age: 85
End: 2025-08-25
Payer: MEDICARE

## 2025-08-25 VITALS
DIASTOLIC BLOOD PRESSURE: 77 MMHG | SYSTOLIC BLOOD PRESSURE: 132 MMHG | RESPIRATION RATE: 18 BRPM | HEART RATE: 69 BPM | OXYGEN SATURATION: 94 % | TEMPERATURE: 98 F

## 2025-08-25 PROCEDURE — G0300 HHS/HOSPICE OF LPN EA 15 MIN: HCPCS

## 2025-08-25 ASSESSMENT — ENCOUNTER SYMPTOMS
DYSPNEA ACTIVITY LEVEL: AFTER AMBULATING 10 - 20 FT
FATIGUES EASILY: 1
APPETITE LEVEL: GOOD
LOWER EXTREMITY EDEMA: 1
LIMITED RANGE OF MOTION: 1
PERSON REPORTING PAIN: PATIENT
BOWEL PATTERN NORMAL: 1
DENIES PAIN: 1
LAST BOWEL MOVEMENT: 67442
SHORTNESS OF BREATH: 1
MUSCLE WEAKNESS: 1
DYSPNEA ON EXERTION: 1
CHANGE IN APPETITE: UNCHANGED

## 2025-08-25 ASSESSMENT — ACTIVITIES OF DAILY LIVING (ADL)
FEEDING ASSESSED: 1
BATHING_CURRENT_FUNCTION: MODERATE ASSIST
BATHING ASSESSED: 1
DRESSING_LB_CURRENT_FUNCTION: MODERATE ASSIST
DRESSING_UB_CURRENT_FUNCTION: INDEPENDENT
TOILETING: 1
AMBULATION ASSISTANCE: INDEPENDENT
AMBULATION ASSISTANCE: 1
TOILETING: INDEPENDENT
FEEDING: INDEPENDENT

## 2025-08-26 ENCOUNTER — HOME CARE VISIT (OUTPATIENT)
Dept: HOME HEALTH SERVICES | Facility: HOME HEALTH | Age: 85
End: 2025-08-26
Payer: MEDICARE

## 2025-08-26 VITALS — RESPIRATION RATE: 20 BRPM

## 2025-08-26 PROCEDURE — G0151 HHCP-SERV OF PT,EA 15 MIN: HCPCS

## 2025-08-26 SDOH — HEALTH STABILITY: PHYSICAL HEALTH: EXERCISE TYPE: WHEP

## 2025-08-26 SDOH — HEALTH STABILITY: PHYSICAL HEALTH: EXERCISE ACTIVITIES SETS: 1

## 2025-08-26 SDOH — HEALTH STABILITY: PHYSICAL HEALTH: PHYSICAL EXERCISE: 10

## 2025-08-26 SDOH — HEALTH STABILITY: PHYSICAL HEALTH: EXERCISE ACTIVITY: ANKLE PUMPS, QUAD AND GLUT SETS

## 2025-08-26 SDOH — HEALTH STABILITY: PHYSICAL HEALTH: PHYSICAL EXERCISE: SUPINE

## 2025-08-26 ASSESSMENT — ENCOUNTER SYMPTOMS
OCCASIONAL FEELINGS OF UNSTEADINESS: 1
SUBJECTIVE PAIN PROGRESSION: UNCHANGED
PERSON REPORTING PAIN: PATIENT
PAIN SEVERITY GOAL: 0/10
PAIN: 1
LOWEST PAIN SEVERITY IN PAST 24 HOURS: 4/10
PAIN LOCATION: LEFT LEG
HIGHEST PAIN SEVERITY IN PAST 24 HOURS: 5/10

## 2025-08-29 ENCOUNTER — HOME CARE VISIT (OUTPATIENT)
Dept: HOME HEALTH SERVICES | Facility: HOME HEALTH | Age: 85
End: 2025-08-29
Payer: MEDICARE

## 2025-08-29 VITALS — OXYGEN SATURATION: 97 % | HEART RATE: 70 BPM

## 2025-08-29 PROCEDURE — G0157 HHC PT ASSISTANT EA 15: HCPCS | Mod: CQ

## 2025-08-29 ASSESSMENT — ENCOUNTER SYMPTOMS
DENIES PAIN: 1
PERSON REPORTING PAIN: PATIENT

## 2025-08-31 ENCOUNTER — HOME CARE VISIT (OUTPATIENT)
Dept: HOME HEALTH SERVICES | Facility: HOME HEALTH | Age: 85
End: 2025-08-31
Payer: MEDICARE

## 2025-08-31 ASSESSMENT — ENCOUNTER SYMPTOMS
MUSCLE WEAKNESS: 1
PAIN SEVERITY GOAL: 0/10
HIGHEST PAIN SEVERITY IN PAST 24 HOURS: 0/10
LOWEST PAIN SEVERITY IN PAST 24 HOURS: 0/10
APPETITE LEVEL: FAIR

## 2025-08-31 ASSESSMENT — PAIN SCALES - PAIN ASSESSMENT IN ADVANCED DEMENTIA (PAINAD): BREATHING: 0

## 2025-09-02 ENCOUNTER — HOME CARE VISIT (OUTPATIENT)
Dept: HOME HEALTH SERVICES | Facility: HOME HEALTH | Age: 85
End: 2025-09-02
Payer: MEDICARE

## 2025-09-02 PROCEDURE — G0157 HHC PT ASSISTANT EA 15: HCPCS | Mod: CQ

## 2025-09-02 ASSESSMENT — ENCOUNTER SYMPTOMS
PERSON REPORTING PAIN: PATIENT
DENIES PAIN: 1

## 2025-09-04 ENCOUNTER — HOME CARE VISIT (OUTPATIENT)
Dept: HOME HEALTH SERVICES | Facility: HOME HEALTH | Age: 85
End: 2025-09-04
Payer: MEDICARE

## 2025-09-04 VITALS — HEART RATE: 68 BPM | OXYGEN SATURATION: 97 %

## 2025-09-04 PROCEDURE — G0157 HHC PT ASSISTANT EA 15: HCPCS | Mod: CQ

## 2025-09-04 ASSESSMENT — ENCOUNTER SYMPTOMS
PAIN SEVERITY GOAL: 0/10
PAIN: 1
PAIN LOCATION: RIGHT HIP
PERSON REPORTING PAIN: PATIENT
LOWEST PAIN SEVERITY IN PAST 24 HOURS: 5/10
HIGHEST PAIN SEVERITY IN PAST 24 HOURS: 8/10

## 2025-10-14 ENCOUNTER — APPOINTMENT (OUTPATIENT)
Dept: PRIMARY CARE | Facility: CLINIC | Age: 85
End: 2025-10-14
Payer: MEDICARE